# Patient Record
Sex: FEMALE | Race: BLACK OR AFRICAN AMERICAN | NOT HISPANIC OR LATINO | Employment: FULL TIME | URBAN - METROPOLITAN AREA
[De-identification: names, ages, dates, MRNs, and addresses within clinical notes are randomized per-mention and may not be internally consistent; named-entity substitution may affect disease eponyms.]

---

## 2019-03-17 LAB — EXTERNAL GENE TEST BETA-THALASSEMIA: NORMAL

## 2020-01-27 ENCOUNTER — APPOINTMENT (EMERGENCY)
Dept: ULTRASOUND IMAGING | Facility: HOSPITAL | Age: 25
End: 2020-01-27
Payer: COMMERCIAL

## 2020-01-27 ENCOUNTER — HOSPITAL ENCOUNTER (EMERGENCY)
Facility: HOSPITAL | Age: 25
Discharge: HOME | End: 2020-01-27
Attending: EMERGENCY MEDICINE
Payer: COMMERCIAL

## 2020-01-27 VITALS
RESPIRATION RATE: 18 BRPM | SYSTOLIC BLOOD PRESSURE: 130 MMHG | HEART RATE: 72 BPM | TEMPERATURE: 98.7 F | DIASTOLIC BLOOD PRESSURE: 71 MMHG | WEIGHT: 131.61 LBS

## 2020-01-27 DIAGNOSIS — O20.0 THREATENED MISCARRIAGE: ICD-10-CM

## 2020-01-27 DIAGNOSIS — N93.9 VAGINAL BLEEDING: Primary | ICD-10-CM

## 2020-01-27 LAB
ABO GROUP BLD: NORMAL
ALBUMIN SERPL BCP-MCNC: 3.9 G/DL (ref 3.5–5)
ALP SERPL-CCNC: 48 U/L (ref 46–116)
ALT SERPL W P-5'-P-CCNC: 26 U/L (ref 12–78)
ANION GAP SERPL CALCULATED.3IONS-SCNC: 8 MMOL/L (ref 4–13)
AST SERPL W P-5'-P-CCNC: 22 U/L (ref 5–45)
B-HCG SERPL-ACNC: 4284 MIU/ML
BASOPHILS # BLD AUTO: 0.02 THOUSANDS/ΜL (ref 0–0.1)
BASOPHILS NFR BLD AUTO: 1 % (ref 0–1)
BILIRUB SERPL-MCNC: 0.47 MG/DL (ref 0.2–1)
BILIRUB UR QL STRIP: NEGATIVE
BUN SERPL-MCNC: 9 MG/DL (ref 5–25)
C TRACH DNA SPEC QL NAA+PROBE: NEGATIVE
CALCIUM SERPL-MCNC: 9.2 MG/DL (ref 8.3–10.1)
CHLORIDE SERPL-SCNC: 103 MMOL/L (ref 100–108)
CLARITY UR: CLEAR
CO2 SERPL-SCNC: 26 MMOL/L (ref 21–32)
COLOR UR: YELLOW
CREAT SERPL-MCNC: 0.75 MG/DL (ref 0.6–1.3)
EOSINOPHIL # BLD AUTO: 0.03 THOUSAND/ΜL (ref 0–0.61)
EOSINOPHIL NFR BLD AUTO: 1 % (ref 0–6)
ERYTHROCYTE [DISTWIDTH] IN BLOOD BY AUTOMATED COUNT: 15.3 % (ref 11.6–15.1)
GFR SERPL CREATININE-BSD FRML MDRD: 127 ML/MIN/1.73SQ M
GLUCOSE SERPL-MCNC: 80 MG/DL (ref 65–140)
GLUCOSE UR STRIP-MCNC: NEGATIVE MG/DL
HCT VFR BLD AUTO: 37.1 % (ref 34.8–46.1)
HGB BLD-MCNC: 11.8 G/DL (ref 11.5–15.4)
HGB UR QL STRIP.AUTO: NEGATIVE
IMM GRANULOCYTES # BLD AUTO: 0 THOUSAND/UL (ref 0–0.2)
IMM GRANULOCYTES NFR BLD AUTO: 0 % (ref 0–2)
KETONES UR STRIP-MCNC: NEGATIVE MG/DL
LEUKOCYTE ESTERASE UR QL STRIP: NEGATIVE
LYMPHOCYTES # BLD AUTO: 1.7 THOUSANDS/ΜL (ref 0.6–4.47)
LYMPHOCYTES NFR BLD AUTO: 45 % (ref 14–44)
MCH RBC QN AUTO: 29.4 PG (ref 26.8–34.3)
MCHC RBC AUTO-ENTMCNC: 31.8 G/DL (ref 31.4–37.4)
MCV RBC AUTO: 93 FL (ref 82–98)
MONOCYTES # BLD AUTO: 0.29 THOUSAND/ΜL (ref 0.17–1.22)
MONOCYTES NFR BLD AUTO: 8 % (ref 4–12)
N GONORRHOEA DNA SPEC QL NAA+PROBE: NEGATIVE
NEUTROPHILS # BLD AUTO: 1.77 THOUSANDS/ΜL (ref 1.85–7.62)
NEUTS SEG NFR BLD AUTO: 45 % (ref 43–75)
NITRITE UR QL STRIP: NEGATIVE
NRBC BLD AUTO-RTO: 0 /100 WBCS
PH UR STRIP.AUTO: 5.5 [PH]
PLATELET # BLD AUTO: 196 THOUSANDS/UL (ref 149–390)
PMV BLD AUTO: 9.2 FL (ref 8.9–12.7)
POTASSIUM SERPL-SCNC: 4.3 MMOL/L (ref 3.5–5.3)
PROT SERPL-MCNC: 7.8 G/DL (ref 6.4–8.2)
PROT UR STRIP-MCNC: NEGATIVE MG/DL
RBC # BLD AUTO: 4.01 MILLION/UL (ref 3.81–5.12)
RH BLD: POSITIVE
SODIUM SERPL-SCNC: 137 MMOL/L (ref 136–145)
SP GR UR STRIP.AUTO: 1.02 (ref 1–1.03)
UROBILINOGEN UR QL STRIP.AUTO: 0.2 E.U./DL
WBC # BLD AUTO: 3.81 THOUSAND/UL (ref 4.31–10.16)

## 2020-01-27 PROCEDURE — 36415 COLL VENOUS BLD VENIPUNCTURE: CPT | Performed by: EMERGENCY MEDICINE

## 2020-01-27 PROCEDURE — 99284 EMERGENCY DEPT VISIT MOD MDM: CPT

## 2020-01-27 PROCEDURE — 99284 EMERGENCY DEPT VISIT MOD MDM: CPT | Performed by: EMERGENCY MEDICINE

## 2020-01-27 PROCEDURE — 87591 N.GONORRHOEAE DNA AMP PROB: CPT | Performed by: EMERGENCY MEDICINE

## 2020-01-27 PROCEDURE — 86901 BLOOD TYPING SEROLOGIC RH(D): CPT | Performed by: EMERGENCY MEDICINE

## 2020-01-27 PROCEDURE — 87491 CHLMYD TRACH DNA AMP PROBE: CPT | Performed by: EMERGENCY MEDICINE

## 2020-01-27 PROCEDURE — 85025 COMPLETE CBC W/AUTO DIFF WBC: CPT | Performed by: EMERGENCY MEDICINE

## 2020-01-27 PROCEDURE — 86900 BLOOD TYPING SEROLOGIC ABO: CPT | Performed by: EMERGENCY MEDICINE

## 2020-01-27 PROCEDURE — 84702 CHORIONIC GONADOTROPIN TEST: CPT | Performed by: EMERGENCY MEDICINE

## 2020-01-27 PROCEDURE — 81003 URINALYSIS AUTO W/O SCOPE: CPT | Performed by: EMERGENCY MEDICINE

## 2020-01-27 PROCEDURE — 80053 COMPREHEN METABOLIC PANEL: CPT | Performed by: EMERGENCY MEDICINE

## 2020-01-27 PROCEDURE — 76801 OB US < 14 WKS SINGLE FETUS: CPT

## 2020-01-27 NOTE — ED NOTES
Pelvic exam completed by Dr Brian Connelly with RN at [address] , [address] [place] [address]  01/27/20 7048

## 2020-01-27 NOTE — ED NOTES
Pt angry wanting to know what is taking so long for results, wants to leave saying over and over "I'm on a time frame I have kids and when you tell me a time frame I stick to it, the ultasound " lady" told me 1/2 hour and that was at 11:30 I need to leave and  my kids "  Dr Steven Nielsen in attempting to explain that we're waiting for the rest of her results  She began to argue with him, picked up her personal items and walked out        Lucas Chan, JULIA  01/27/20 [address] RN  01/27/20 4644

## 2020-01-27 NOTE — ED PROVIDER NOTES
History  Chief Complaint   Patient presents with    Vaginal Bleeding     Pt  reports just finding out she was pregnant, approx 6 weeks  Pt  reports having intercourse with  last night, this am she had a watery discharge and very light vaginal bleeding     Patient is a 43-year-old female who is currently pregnant and presents with vaginal bleeding  Patient states that her last menstrual cycle was the last week in December  She took multiple home pregnancy tests over the weekend which were all positive  Last evening, she had sexual intercourse with her  and today noted vaginal bleeding  She states that it is light but concerning given the fact that she had a miscarriage last year  She denies abdominal pain/cramping, back pain or other complaints  She denies fever, chills, nausea, vomiting, diarrhea, dysuria, hematuria or other complaints  She is [id number]      History provided by:  Patient  Vaginal Bleeding   Quality:  Spotting  Duration:  1 day  Timing:  Intermittent  Chronicity:  New  Possible pregnancy: yes    Context: after intercourse    Ineffective treatments:  None tried  Associated symptoms: vaginal discharge (clear)    Associated symptoms: no abdominal pain, no back pain, no dizziness, no dysuria, no fever and no nausea        None       History reviewed  No pertinent past medical history  History reviewed  No pertinent surgical history  History reviewed  No pertinent family history  I have reviewed and agree with the history as documented  Social History     Tobacco Use    Smoking status: Former Smoker    Smokeless tobacco: Never Used   Substance Use Topics    Alcohol use: Never     Frequency: Never    Drug use: Never        Review of Systems   Constitutional: Negative for chills, diaphoresis and fever  HENT: Negative for nosebleeds, sore throat and trouble swallowing  Eyes: Negative for photophobia, pain and visual disturbance     Respiratory: Negative for cough, chest tightness and shortness of breath  Cardiovascular: Negative for chest pain, palpitations and leg swelling  Gastrointestinal: Negative for abdominal pain, constipation, diarrhea, nausea and vomiting  Endocrine: Negative for polydipsia and polyuria  Genitourinary: Positive for vaginal bleeding and vaginal discharge (clear)  Negative for difficulty urinating, dysuria, hematuria and pelvic pain  Musculoskeletal: Negative for back pain, neck pain and neck stiffness  Skin: Negative for pallor and rash  Neurological: Negative for dizziness, seizures, light-headedness and headaches  All other systems reviewed and are negative  Physical Exam  Physical Exam   Constitutional: She is oriented to person, place, and time  She appears well-developed and well-nourished  No distress  HENT:   Head: Normocephalic and atraumatic  Mouth/Throat: Oropharynx is clear and moist and mucous membranes are normal    Eyes: Pupils are equal, round, and reactive to light  EOM are normal    Neck: Normal range of motion  Neck supple  Cardiovascular: Normal rate, regular rhythm, normal heart sounds, intact distal pulses and normal pulses  Pulmonary/Chest: Effort normal and breath sounds normal  No respiratory distress  Abdominal: Soft  She exhibits mass (small, reducible umbilical hernia)  She exhibits no distension  There is no tenderness  There is no rigidity, no rebound and no guarding  Genitourinary: There is bleeding in the vagina  No signs of injury around the vagina  Genitourinary Comments: Small amount of dark blood from the cervical os  No clots or tissue visible  Os closed  Musculoskeletal: Normal range of motion  She exhibits no edema or tenderness  Lymphadenopathy:     She has no cervical adenopathy  Neurological: She is alert and oriented to person, place, and time  She has normal strength  No cranial nerve deficit or sensory deficit  Skin: Skin is warm and dry   Capillary refill takes less than 2 seconds  Psychiatric: She has a normal mood and affect  Nursing note and vitals reviewed        Vital Signs  ED Triage Vitals [01/27/20 1040]   Temperature Pulse Respirations Blood Pressure SpO2   98 7 °F (37 1 °C) 72 18 130/71 --      Temp Source Heart Rate Source Patient Position - Orthostatic VS BP Location FiO2 (%)   Oral Monitor Lying Right arm --      Pain Score       --           Vitals:    01/27/20 1040   BP: 130/71   Pulse: 72   Patient Position - Orthostatic VS: Lying         Visual Acuity      ED Medications  Medications - No data to display    Diagnostic Studies  Results Reviewed     Procedure Component Value Units Date/Time    hCG, quantitative [[id number]]  (Abnormal) Collected:  01/27/20 1114    Lab Status:  Final result Specimen:  Blood from Arm, Right Updated:  01/27/20 1322     HCG, Quant 4,284 mIU/mL     Narrative:        Expected Ranges:     Approximate               Approximate HCG  Gestation age          Concentration ( mIU/mL)  _____________          ______________________   [facility]                      HCG values  0 2-1                       5-50  1-2                           2-3                         100-5000  3-4                         500-54774  4-5                         1000-14572  5-6                         [id number]  6-8                         22849-728647  8-12                        [id number]      Comprehensive metabolic panel [[id number]] Collected:  01/27/20 1114    Lab Status:  Final result Specimen:  Blood from Arm, Right Updated:  01/27/20 1207     Sodium 137 mmol/L      Potassium 4 3 mmol/L      Chloride 103 mmol/L      CO2 26 mmol/L      ANION GAP 8 mmol/L      BUN 9 mg/dL      Creatinine 0 75 mg/dL      Glucose 80 mg/dL      Calcium 9 2 mg/dL      AST 22 U/L      ALT 26 U/L      Alkaline Phosphatase 48 U/L      Total Protein 7 8 g/dL      Albumin 3 9 g/dL      Total Bilirubin 0 47 mg/dL      eGFR 127 ml/min/1 73sq m     Narrative:       Consolidated [employer] guidelines for Chronic Kidney Disease (CKD):     Stage 1 with normal or high GFR (GFR > 90 mL/min/1 73 square meters)    Stage 2 Mild CKD (GFR = 60-89 mL/min/1 73 square meters)    Stage 3A Moderate CKD (GFR = 45-59 mL/min/1 73 square meters)    Stage 3B Moderate CKD (GFR = 30-44 mL/min/1 73 square meters)    Stage 4 Severe CKD (GFR = 15-29 mL/min/1 73 square meters)    Stage 5 End Stage CKD (GFR <15 mL/min/1 73 square meters)  Note: GFR calculation is accurate only with a steady state creatinine    UA (URINE) with reflex to Scope [[id number]] Collected:  01/27/20 1114    Lab Status:  Final result Specimen:  Urine, Clean Catch Updated:  01/27/20 1139     Color, UA Yellow     Clarity, UA Clear     Specific Gravity, UA 1 025     pH, UA 5 5     Leukocytes, UA Negative     Nitrite, UA Negative     Protein, UA Negative mg/dl      Glucose, UA Negative mg/dl      Ketones, UA Negative mg/dl      Urobilinogen, UA 0 2 E U /dl      Bilirubin, UA Negative     Blood, UA Negative    CBC and differential [[id number]]  (Abnormal) Collected:  01/27/20 1114    Lab Status:  Final result Specimen:  Blood from Arm, Right Updated:  01/27/20 1130     WBC 3 81 Thousand/uL      RBC 4 01 Million/uL      Hemoglobin 11 8 g/dL      Hematocrit 37 1 %      MCV 93 fL      MCH 29 4 pg      MCHC 31 8 g/dL      RDW 15 3 %      MPV 9 2 fL      Platelets 002 Thousands/uL      nRBC 0 /100 WBCs      Neutrophils Relative 45 %      Immat GRANS % 0 %      Lymphocytes Relative 45 %      Monocytes Relative 8 %      Eosinophils Relative 1 %      Basophils Relative 1 %      Neutrophils Absolute 1 77 Thousands/µL      Immature Grans Absolute 0 00 Thousand/uL      Lymphocytes Absolute 1 70 Thousands/µL      Monocytes Absolute 0 29 Thousand/µL      Eosinophils Absolute 0 03 Thousand/µL      Basophils Absolute 0 02 Thousands/µL     Chlamydia/GC amplified DNA by PCR [[id number]] Collected:  01/27/20 1114    Lab Status:   In process Specimen:  Urine, Other Updated:  01/27/20 1127                 US OB < 14 weeks with transvaginal   Final Result by Bud Zavaleta MD (01/27 1152)   An irregularly-shaped heterogeneous appearing intrauterine gestational sac identified  According to sac size estimated gestational age would be approximately 6 weeks 5 days  No fetal pole, cardiac activity or yolk sac is seen  No adnexal mass, no free fluid  Based on current consensus literature terminology, this is an intrauterine pregnancy of uncertain viability, although concerning for but not definitively diagnostic of nonviable intrauterine pregnancy  Serial quantitative beta hCG levels and/or repeat    ultrasound recommended as clinically indicated   Reference: N Engl J Med 538;57 pp  2063 to 1451  The examination demonstrates a significant  finding and was documented as such in [employer] for liaison and referring practitioner notification  Workstation performed: [id number]                    Procedures  Procedures         ED Course  ED Course as of Jan 27 1822   Miriam Amaya Jan 27, 2020   1321 Patient left without completing workup and treatment  MDM  Number of Diagnoses or Management Options  Threatened miscarriage: new and requires workup  Vaginal bleeding: new and requires workup  Diagnosis management comments: Patient presents with early pregnancy and vaginal bleeding  Pelvic exam revealed small amount of bleeding from cervical os  Cervical os is closed and there is no evidence of trauma  Ultrasound shows an intrauterine pregnancy  However there are no fetal heart tones and is concerning for nonviable pregnancy  Patient is Rh positive and does not require RhoGAM   Prior to discussing lab results and ultrasound, patient became angry about the delay  She demanded that her IV be taken out  I entered the room after being notified by nursing  I spoke with patient and tried to relay the results of her workup    However patient did not want to hear about her results when left the room  Patient states she will be going to [facility]   Patient was hemodynamically stable and well-appearing as she left prior to completing treatment  Amount and/or Complexity of Data Reviewed  Clinical lab tests: ordered and reviewed  Tests in the radiology section of CPT®: ordered and reviewed  Tests in the medicine section of CPT®: ordered and reviewed  Review and summarize past medical records: yes  Independent visualization of images, tracings, or specimens: yes    Risk of Complications, Morbidity, and/or Mortality  Presenting problems: high  Diagnostic procedures: moderate  Management options: moderate    Patient Progress  Patient progress: stable        Disposition  Final diagnoses:   Vaginal bleeding   Threatened miscarriage     Time reflects when diagnosis was documented in both MDM as applicable and the Disposition within this note     Time User Action Codes Description Comment    1/27/2020  1:22 PM Deborah Bue Add [N93 9] Vaginal bleeding     1/27/2020  1:22 PM Deborah Bue Add [O20 0] Threatened miscarriage       ED Disposition     ED Disposition Condition Date/Time Comment    Left from Room after Provider Exam  Mon Jan 27, 2020  1:22 PM       Follow-up Information    None         There are no discharge medications for this patient  No discharge procedures on file      ED Provider  Electronically Signed by           Glenn Hawley DO  01/27/20 Matilda Henderson

## 2020-03-06 ENCOUNTER — TELEPHONE (OUTPATIENT)
Dept: OBGYN CLINIC | Facility: CLINIC | Age: 25
End: 2020-03-06

## 2020-03-06 DIAGNOSIS — F41.9 ANXIETY AND DEPRESSION: Primary | ICD-10-CM

## 2020-03-06 DIAGNOSIS — F32.A ANXIETY AND DEPRESSION: Primary | ICD-10-CM

## 2020-03-06 RX ORDER — BUSPIRONE HYDROCHLORIDE 15 MG/1
15 TABLET ORAL 2 TIMES DAILY
Qty: 60 TABLET | Refills: 0 | Status: SHIPPED | OUTPATIENT
Start: 2020-03-06 | End: 2020-11-15

## 2020-03-06 NOTE — TELEPHONE ENCOUNTER
Patient called and said you can call her Buspar into [facility] in [facility]

## 2020-03-06 NOTE — TELEPHONE ENCOUNTER
Edc 9/24/2020, she is having problems with depression,Bustirone HCl was given   In February , she states it does help, she is having , a lot of problems, at home, , she is happy about the pregnancy, , she does not have any thoughts of hurting herself , please advise

## 2020-03-09 ENCOUNTER — DOCUMENTATION (OUTPATIENT)
Dept: OBGYN CLINIC | Facility: CLINIC | Age: 25
End: 2020-03-09

## 2020-03-16 ENCOUNTER — TELEPHONE (OUTPATIENT)
Dept: OBGYN CLINIC | Facility: CLINIC | Age: 25
End: 2020-03-16

## 2020-03-16 NOTE — TELEPHONE ENCOUNTER
called patient back as per Dr Maxx Morel, advised to come in for  An evaluation , spoke with patient she has declined, states that when she was  At  [address] [facility], Maternal fetal medicine, and they said this was ok if not red in color, advised would be best to be seen today, she states she will come tomorrow for her regular scheduled appointment

## 2020-03-16 NOTE — TELEPHONE ENCOUNTER
Patient called she has her first Ob appointment with you tomorrow  , she has some brown discharge, and was worried about coming in due to the  [employer] virus, she is not having any cramping,  O positive blood type , she is 12 w 4d gestation

## 2020-03-17 ENCOUNTER — ROUTINE PRENATAL (OUTPATIENT)
Dept: OBGYN CLINIC | Facility: CLINIC | Age: 25
End: 2020-03-17
Payer: COMMERCIAL

## 2020-03-17 VITALS
HEIGHT: 65 IN | DIASTOLIC BLOOD PRESSURE: 70 MMHG | BODY MASS INDEX: 22.59 KG/M2 | SYSTOLIC BLOOD PRESSURE: 108 MMHG | WEIGHT: 135.6 LBS

## 2020-03-17 DIAGNOSIS — O46.8X1 SUBCHORIONIC HEMORRHAGE OF PLACENTA IN FIRST TRIMESTER, SINGLE OR UNSPECIFIED FETUS: ICD-10-CM

## 2020-03-17 DIAGNOSIS — F41.9 ANXIETY AND DEPRESSION: ICD-10-CM

## 2020-03-17 DIAGNOSIS — Z3A.12 12 WEEKS GESTATION OF PREGNANCY: Primary | ICD-10-CM

## 2020-03-17 DIAGNOSIS — F32.A ANXIETY AND DEPRESSION: ICD-10-CM

## 2020-03-17 DIAGNOSIS — O20.9 FIRST TRIMESTER BLEEDING: ICD-10-CM

## 2020-03-17 DIAGNOSIS — O09.219 PREVIOUS PRETERM DELIVERY, ANTEPARTUM: ICD-10-CM

## 2020-03-17 DIAGNOSIS — O41.8X10 SUBCHORIONIC HEMORRHAGE OF PLACENTA IN FIRST TRIMESTER, SINGLE OR UNSPECIFIED FETUS: ICD-10-CM

## 2020-03-17 PROBLEM — O41.8X90 SUBCHORIONIC HEMORRHAGE: Status: ACTIVE | Noted: 2020-02-14

## 2020-03-17 PROBLEM — O46.8X9 SUBCHORIONIC HEMORRHAGE: Status: ACTIVE | Noted: 2020-02-14

## 2020-03-17 LAB
CFTR MUT ANL BLD/T: NORMAL
EXTERNAL AFP: NORMAL
EXTERNAL GENE TEST BETA-THALASSEMIA: NORMAL
EXTERNAL SICKLE CELL SCREEN: NEGATIVE
SL AMB  POCT GLUCOSE, UA: NORMAL
SL AMB LEUKOCYTE ESTERASE,UA: NORMAL
SL AMB POCT BILIRUBIN,UA: NORMAL
SL AMB POCT BLOOD,UA: NORMAL
SL AMB POCT KETONES,UA: NORMAL
SL AMB POCT NITRITE,UA: NORMAL
SL AMB POCT SPECIFIC GRAVITY,UA: NORMAL
SL AMB POCT URINE PROTEIN: NORMAL
SL AMB POCT UROBILINOGEN: NORMAL

## 2020-03-17 PROCEDURE — 81002 URINALYSIS NONAUTO W/O SCOPE: CPT | Performed by: OBSTETRICS & GYNECOLOGY

## 2020-03-17 PROCEDURE — 99214 OFFICE O/P EST MOD 30 MIN: CPT | Performed by: OBSTETRICS & GYNECOLOGY

## 2020-03-17 RX ORDER — SWAB
1 SWAB, NON-MEDICATED MISCELLANEOUS DAILY
COMMUNITY
End: 2020-12-09 | Stop reason: SDUPTHER

## 2020-03-17 RX ORDER — METOCLOPRAMIDE 10 MG/1
10 TABLET ORAL 4 TIMES DAILY
COMMUNITY
End: 2020-11-15

## 2020-03-17 NOTE — PATIENT INSTRUCTIONS
Subchorionic Hemorrhage   WHAT YOU NEED TO KNOW:   A subchorionic hemorrhage [facility]), or hematoma, is a collection of blood between the placenta and the uterus  [facility] usually develops late in the first trimester  The bleeding usually reabsorbs into your body by 20 weeks of pregnancy  Most pregnancies progress without problems  You may have occasional spotting or light bleeding throughout your pregnancy  DISCHARGE INSTRUCTIONS:   Return to the emergency department if:   · You have a fever  · You have abdominal pain  · You have a sudden increase in bleeding  Contact your healthcare provider if:   · Your bleeding has increased  · You have questions or concerns about your condition or care  Pelvic rest:  Do not have sex, douche, or use tampons  Do not strain or lift heavy objects  These activities may cause contractions or infection and put you or your baby at risk  You may need to rest more than usual  Do daily activities as directed  Follow up with your healthcare provider as directed: You may need to return frequently for ultrasounds  Write down your questions so you remember to ask them during your visits  © 2017 [facility] Information is for End User's use only and may not be sold, redistributed or otherwise used for commercial purposes  All illustrations and images included in CareNotes® are the copyrighted property of A D A M , Inc  or Kurt Longo  The above information is an  only  It is not intended as medical advice for individual conditions or treatments  Talk to your doctor, nurse or pharmacist before following any medical regimen to see if it is safe and effective for you

## 2020-03-17 NOTE — PROGRESS NOTES
Pt states that she was bleeding and now her discharge looks like old blood, past two days pt has had severe nausea and no vomiting, pt has hot flashes, slight headaches no dizziness, sob while walking, swelling of feet  Pt states while picking up her daughter she notices her hernia comes out  Pt has minor back pain no further problems or complaints

## 2020-03-17 NOTE — PROGRESS NOTES
Patient presents at 12 weeks and 5 days gestational age for her 1st OB visit  Patient has a known subchorionic hematoma and is presently under pelvic rest   She has seen maternal fetal medicine on 2 separate occasions  She has a follow-up with maternal fetal medicine scheduled  Patient had normal cultures in January of 2020  Pap smear was performed today  Patient is  and in a long-term monogamous relationship  She is presently doing well on BuSpar 15 mg twice daily  She states that she suffers from anxiety and depression exacerbated by the loss of her father in November of 2019  She denies any thoughts of harming her children or harming herself    Follow-up  in 4 weeks

## 2020-03-25 ENCOUNTER — TELEPHONE (OUTPATIENT)
Dept: OBGYN CLINIC | Facility: CLINIC | Age: 25
End: 2020-03-25

## 2020-03-25 NOTE — TELEPHONE ENCOUNTER
Patient called had some brown spotting this weekend, this has been recurrent, her blood type is  O positive , as per patient and she has no cramping, she has an appointment at [facility], this week

## 2020-03-25 NOTE — TELEPHONE ENCOUNTER
If she is having any cramping or spotting continues please have her come in to see me as soon as possible

## 2020-03-25 NOTE — TELEPHONE ENCOUNTER
As per Dr Rashard Gutierrez via paper chart note, panorama results is low risk, male fetus, spoke with patient requested verbal results, also requested copy be left at

## 2020-03-26 ENCOUNTER — OB ABSTRACT (OUTPATIENT)
Dept: OBGYN CLINIC | Facility: CLINIC | Age: 25
End: 2020-03-26

## 2020-03-26 ENCOUNTER — ROUTINE PRENATAL (OUTPATIENT)
Dept: OBGYN CLINIC | Facility: CLINIC | Age: 25
End: 2020-03-26
Payer: COMMERCIAL

## 2020-03-26 VITALS
BODY MASS INDEX: 24.11 KG/M2 | HEIGHT: 65 IN | SYSTOLIC BLOOD PRESSURE: 112 MMHG | WEIGHT: 144.7 LBS | DIASTOLIC BLOOD PRESSURE: 70 MMHG

## 2020-03-26 DIAGNOSIS — F32.A ANXIETY AND DEPRESSION: ICD-10-CM

## 2020-03-26 DIAGNOSIS — F41.9 ANXIETY AND DEPRESSION: ICD-10-CM

## 2020-03-26 DIAGNOSIS — O26.851 SPOTTING COMPLICATING PREGNANCY IN FIRST TRIMESTER: ICD-10-CM

## 2020-03-26 DIAGNOSIS — Z3A.14 14 WEEKS GESTATION OF PREGNANCY: Primary | ICD-10-CM

## 2020-03-26 LAB
SL AMB  POCT GLUCOSE, UA: NORMAL
SL AMB LEUKOCYTE ESTERASE,UA: NORMAL
SL AMB POCT BILIRUBIN,UA: NORMAL
SL AMB POCT BLOOD,UA: NORMAL
SL AMB POCT KETONES,UA: NORMAL
SL AMB POCT NITRITE,UA: NORMAL
SL AMB POCT SPECIFIC GRAVITY,UA: NORMAL
SL AMB POCT URINE PROTEIN: NORMAL
SL AMB POCT UROBILINOGEN: NORMAL

## 2020-03-26 PROCEDURE — 99213 OFFICE O/P EST LOW 20 MIN: CPT | Performed by: OBSTETRICS & GYNECOLOGY

## 2020-03-26 NOTE — PROGRESS NOTES
Patient presents at 17 weeks gestational age complaining of continued brown vaginal staining  Patient states she notes it each time she uses the bathroom  She notes brown in the toilet as well as on the toilet tissue  She denies any pain  She denies any clear fluid  She denies any dysuria or urinary frequency  She is not yet appreciating fetal movement  Her cervix continues to be closed and long however evidence of old brown blood in the cervix and in vaginal vault  Patient does have a known subchorionic hematoma  Continue pelvic rest   Recommend ultrasound with Maternal-Fetal Medicine as soon as feasible  Patient will call for any bright red bleeding  Follow up for regular scheduled OB visit

## 2020-03-26 NOTE — PROGRESS NOTES
Bleeding has slowed down  Pt complains about sore legs and a sore back, she states she passed a blood clot   Lower abdomen is sore

## 2020-03-26 NOTE — PATIENT INSTRUCTIONS
[address]    Destiney Hernandez  1995      To Whom It May Concern:    Destiney Hernandez  is currently under obstetrical care with an Estimated Date of Delivery: 20  We encourage good oral hygiene during pregnancy, including regular dental cleanings  If it becomes necessary for additional dental work to be done such as fillings, root canal, etc , it is best to have it done after the first trimester  However, emergency care can be given throughout pregnancy as needed  The following are recommendations for the obstetrical patients:    1  Double shielded x-rays  2  Local anesthetic without epinephrine  3  Tylenol with codeine for pain  4  Amoxicillin, Penicillin, Erythromycin, Keflex or other Cephalosporins for antibiotics  5  Inhalation therapy and other antibiotics are not recommended  If you have any other questions regarding this patient, please do not hesitate to call us  Thank you,         Corrina Castellon MD     Threatened Miscarriage   WHAT YOU NEED TO KNOW:   A threatened miscarriage occurs when you have vaginal bleeding within the first 20 weeks of pregnancy  It means that a miscarriage may happen  A threatened miscarriage may also be called a threatened   DISCHARGE INSTRUCTIONS:   Seek care immediately if:   · You feel weak or faint  · Your pain or cramping in your abdomen or back gets worse  · You have vaginal bleeding that soaks 1 or more pads in an hour  · You pass material that looks like tissue or large clots  Contact your healthcare provider or obstetrician if:   · You have a fever  · You have trouble urinating, burning when you urinate, or feel a need to urinate often  · You have new or worsening vaginal bleeding  · You have vaginal pain or itching, or vaginal discharge that is yellow, green, or foul-smelling  · You have questions or concerns about your condition or care  Self-care:   The following may help you manage your symptoms and decrease your risk for a miscarriage:  · Do not put anything in your vagina  Do not have sex, douche, or use tampons  These actions may increase your risk for infection and miscarriage  · Rest as directed  Do not exercise or do strenuous activities  These activities may cause  labor or miscarriage  Ask your healthcare provider what activities are okay to do  Stay healthy during pregnancy:   · Eat a variety of healthy foods  Healthy foods can help you get extra protein, water, and calories that you need while you are pregnant  Healthy foods include fruits, vegetables, whole-grain breads, low-fat dairy products, beans, lean meats, and fish  Avoid raw or undercooked meat and fish  Ask your healthcare provider if you need a special diet  · Take prenatal vitamins as directed  These help you get the right amount of vitamins and minerals  They may also decrease the risk of certain birth defects  · Do not drink alcohol or use illegal drugs  These can increase your risk for a miscarriage or harm your baby  · Do not smoke  Nicotine and other chemicals in cigarettes and cigars can harm your baby and cause miscarriage or  labor  Ask your healthcare provider for information if you currently smoke and need help to quit  E-cigarettes or smokeless tobacco still contain nicotine  Do not use these products  · Decrease your risk for an infection  Always wash your hands before eating or preparing meals  Do not spend time with people who are sick  Ask your healthcare provider if you need immunizations such as the flu or hepatitis B vaccine  Immunizations may decrease your risk for infections that could cause a miscarriage  · Manage your medical conditions  Keep your blood pressure and blood sugars under control  Maintain a healthy weight during pregnancy  Follow up with your obstetrician as directed:   You may need to see your obstetrician frequently for ultrasounds or blood tests  Write down your questions so you remember to ask them during your visits  © 2017 [address] [employer] Information is for End User's use only and may not be sold, redistributed or otherwise used for commercial purposes  All illustrations and images included in CareNotes® are the copyrighted property of [employer] Inc  or Kurt Longo  The above information is an  only  It is not intended as medical advice for individual conditions or treatments  Talk to your doctor, nurse or pharmacist before following any medical regimen to see if it is safe and effective for you

## 2020-03-27 ENCOUNTER — ULTRASOUND (OUTPATIENT)
Dept: PERINATAL CARE | Facility: OTHER | Age: 25
End: 2020-03-27
Payer: COMMERCIAL

## 2020-03-27 VITALS — HEIGHT: 65 IN | WEIGHT: 143.4 LBS | BODY MASS INDEX: 23.89 KG/M2

## 2020-03-27 DIAGNOSIS — O09.219 PREVIOUS PRETERM DELIVERY, ANTEPARTUM: ICD-10-CM

## 2020-03-27 DIAGNOSIS — O41.8X20 SUBCHORIONIC HEMORRHAGE OF PLACENTA IN SECOND TRIMESTER, SINGLE OR UNSPECIFIED FETUS: ICD-10-CM

## 2020-03-27 DIAGNOSIS — F32.A ANXIETY AND DEPRESSION: ICD-10-CM

## 2020-03-27 DIAGNOSIS — F41.9 ANXIETY AND DEPRESSION: ICD-10-CM

## 2020-03-27 DIAGNOSIS — O46.8X2 SUBCHORIONIC HEMORRHAGE OF PLACENTA IN SECOND TRIMESTER, SINGLE OR UNSPECIFIED FETUS: ICD-10-CM

## 2020-03-27 DIAGNOSIS — Z3A.14 14 WEEKS GESTATION OF PREGNANCY: Primary | ICD-10-CM

## 2020-03-27 PROCEDURE — 99202 OFFICE O/P NEW SF 15 MIN: CPT | Performed by: OBSTETRICS & GYNECOLOGY

## 2020-03-27 PROCEDURE — 76813 OB US NUCHAL MEAS 1 GEST: CPT | Performed by: OBSTETRICS & GYNECOLOGY

## 2020-03-27 NOTE — PROGRESS NOTES
Ob ultrasound and brief MFM consultation    An ultrasound for viability, dating and nuchal translucency was completed today  Ms Makenna Tolentino reports passing of clots per vagina; no cramping no fever  Ms Makenna Tolentino provided a result of NIPT testing on c her cell phone as low risk  See Ob Procedures in EPIC  1  Live, [place] fetus with size = dates; JANES 20  Normal nuchal translucency at 1 93 mm    2  Large subchorionic collection observed which is covering the internal os  Today's ultrasound findings and suggested follow-up were discussed  with the patient  She was very upset when was informed about the findings of  a large subchorionic collection and suggested we were not being precise with the information  She then calmed down  I had a chaperone in the room (nurse Leslye)  Ob Hx:     : Delivery at term, vaginally, 8lb, baby boy no complications  2019:  delivery at 39? weeks, after spontaneous labor  5lb 14 oz, vaginally baby girl, no other complications  2019: miscarriage D/C     : current pregnancy  Medical hx: Depression ans anxiety    Surgical hx:  D/C for miscarriage in 2019  Medications: Indicated none  In the record is note is taking Buspar (anxiolytic 2 x day)  PNV  Allergies: none    Family Hx: non contributory    Social Hx: No tobacco alcohol or illicit drug use  D/C tobacco use 1 year ago, I congratulated her  I reviewed the results of this ultrasound with Ms Makenna Tolentino and answered her questions  Approximately 1% of all pregnancies are complicated by placental abruption/large subchorionic colection  Nearly 50% of women with placental abruption have no identifiable risk factors   Risk factors include abruption in a prior pregnancy, maternal hypertensive disorders, advanced maternal age, smoking, cocaine use, polyhydramnios, multiple gestation, () premature rupture of membrane, chorioamnionitis, elevated maternal serum alpha-fetoprotein, and abdominal trauma  Association with thrombophilias has not been confirmed by prospective studies     Counseling to reduce risk of postpartum depression  There are significant risks associated with untreated and under treated depression and other mental illnesses in pregnancy  The [address] [facility] [address] is an additional resource for screening and treatment of depression  Resources for a mental health crisis include the emergency department and the [place]  at 9-533-443-TALK  These interventions can reduce the risk of postpartum depression  We discussed her history of spontaneous  birth at by her report 42 weeks   birth (PTB) is the leading cause of  mortality in the [place]  Spontaneous  birth (PTB) includes birth that follows  labor,  spontaneous rupture of membranes, and cervical insufficiency  Approximately 10% of births in the [place] are   One of the strongest clinical risk factors for PTB is a prior PTB  Maternal history of PTB is commonly reported to confer a 1 5-fold to 2-fold increased risk in a subsequent pregnancy, and recurrence occurs in 28 to 48 percent of patients with a prior PTB and tends to recur at similar gestational ages; recurrence is more likely with an increased number of prior spontaneous PTB  Previously we have also recommended progesterone supplementation, typically 17-hydroxyprogesterone caproate (17OHPC or [place]), starting at 16-24 weeks of gestation to reduce the risk of recurrent spontaneous  birth, along with serial cervical length screening  However, very recent data (2019) from the PROLONG trial, a double blind placebo-controlled international trial, found no benefit of this 17-OHPC compared with placebo in reaching either of the co-primary outcomes (PTB<35 weeks and composite  morbidity or mortality)    Notably the patient population of the PROLONG trial was substantially different from the 2003 Meis trial, reflected in the significantly different baseline rates of PTB in the two trials, with 54 9% recurrent PTB at <37 weeks in the placebo group in [address] [place] [address] vs  21 9% in [address] [facility]  The differences in patient populations studied likely account for the different baseline rates of recurrent PTB and potentially explain some of the contrasting results observed in the [address] [place] [address] and PROLONG trials; however, population differences do not completely explain the discrepancy [facility] - [facility] 2019)  Results of both trials indicate that 17-OHPC appears to be safe, at least in the short term, with no increase in congenital anomalies or evidence of teratogenic effects ([facility] 2019); long-term outcomes are unknown although long-term adverse effects have not yet been reported  [facility] believes that it is reasonable for providers to use 17-OPHC in women with a profile more representative of the very high-risk population reported in the [address] [place] [address] trial; for all women at risk of recurrent sPTB, the risk/benefit discussion should incorporate a shared decision-making approach, taking into account the lack of short-term safety concerns but uncertainty regarding benefit ([facility] 2019)  I recommended that based on these considerations she does not fit the risk profile of women who might benefit from use of weekly IM progesterone, mainly those who had  a  birth at < 32 weeks and have other non modifiable  risk factors  I did explain to Ms Maynor Watt that the finding of  a large subchorionic collection/abruptio placentae is a significant risk factor for rupture of membranes,  birth and possible pregnancy loss  I reviewed how we do not have interventions to reduce that risk and the observation, avoidance of heavy lifting and pelvic rest are indicated    Consultation to the hospital if there is active vaginal bleeding,  fever or any other symptoms was recommended with a call to you, her OB Gyn  Recommendations:    1  Follow-up MSAFP at 16-20 weeks to rule out increase risk for ONTDs knowing it might be abnormal due to the abruptio placentae  Yo be ordered and followed from your office  2  Fetal Level II ultrasound imaging is scheduled at about 18-20 weeks gestation  In addition to review of the ultrasound results I completed a consultation in 20 minutes with > 50% in direct face to face contact and coordination of a plan of care  Thank you for referring your patient to our offices  The limitations of ultrasound to detect all anomalies was reviewed and how it is not  a test to rule out aneuploidy  If you have any further questions do not hesitate to contact us as [phone]      Leon Hidalgo MD

## 2020-03-27 NOTE — LETTER
2020     Dorothy Phelan, [address] [facility] [id number]    Patient: Lyndsey Mosquera   YOB: 1995   Date of Visit: 3/27/2020       Dear Dr Hugo Hartmann:    Thank you for referring Andres Mallory to me for evaluation  Below are my notes for this consultation  If you have questions, please do not hesitate to call me  I look forward to following your patient along with you  Sincerely,         [place] [address] [place]        CC: No Recipients  Serena Dyer MD  3/27/2020  5:45 PM  Sign at close encounter  Ob ultrasound and brief MFM consultation    An ultrasound for viability, dating and nuchal translucency was completed today  Ms Grisel Reyes reports passing of clots per vagina; no cramping no fever  Ms Grisel Reyes provided a result of NIPT testing on c her cell phone as low risk  See Ob Procedures in EPIC  1  Live, [place] fetus with size = dates; JANES 20  Normal nuchal translucency at 1 93 mm    2  Large subchorionic collection observed which is covering the internal os  Today's ultrasound findings and suggested follow-up were discussed  with the patient  She was very upset when was informed about the findings of  a large subchorionic collection and suggested we were not being precise with the information  She then calmed down  I had a chaperone in the room (nurse Leslye)  Ob Hx:     : Delivery at term, vaginally, 8lb, baby boy no complications  2019:  delivery at 39? weeks, after spontaneous labor  5lb 14 oz, vaginally baby girl, no other complications  2019: miscarriage D/C     : current pregnancy  Medical hx: Depression ans anxiety    Surgical hx:  D/C for miscarriage in 2019  Medications: Indicated none  In the record is note is taking Buspar (anxiolytic 2 x day)  PNV  Allergies: none    Family Hx: non contributory    Social Hx: No tobacco alcohol or illicit drug use    D/C tobacco use 1 year ago, I congratulated her     I reviewed the results of this ultrasound with Ms José Miguel Murphy and answered her questions  Approximately 1% of all pregnancies are complicated by placental abruption/large subchorionic colection  Nearly 50% of women with placental abruption have no identifiable risk factors  Risk factors include abruption in a prior pregnancy, maternal hypertensive disorders, advanced maternal age, smoking, cocaine use, polyhydramnios, multiple gestation, () premature rupture of membrane, chorioamnionitis, elevated maternal serum alpha-fetoprotein, and abdominal trauma  Association with thrombophilias has not been confirmed by prospective studies     Counseling to reduce risk of postpartum depression  There are significant risks associated with untreated and under treated depression and other mental illnesses in pregnancy  The [address] [place] [address] [address] is an additional resource for screening and treatment of depression  Resources for a mental health crisis include the emergency department and the [place] [address] at 8-570-174-TALK  These interventions can reduce the risk of postpartum depression  We discussed her history of spontaneous  birth at by her report 42 weeks   birth (PTB) is the leading cause of  mortality in the [place]  Spontaneous  birth (PTB) includes birth that follows  labor,  spontaneous rupture of membranes, and cervical insufficiency  Approximately 10% of births in the [place] are   One of the strongest clinical risk factors for PTB is a prior PTB  Maternal history of PTB is commonly reported to confer a 1 5-fold to 2-fold increased risk in a subsequent pregnancy, and recurrence occurs in 28 to 48 percent of patients with a prior PTB and tends to recur at similar gestational ages; recurrence is more likely with an increased number of prior spontaneous PTB      Previously we have also recommended progesterone supplementation, typically 17-hydroxyprogesterone caproate (17OHPC or [place]), starting at 16-24 weeks of gestation to reduce the risk of recurrent spontaneous  birth, along with serial cervical length screening  However, very recent data (2019) from the PROLONG trial, a double blind placebo-controlled international trial, found no benefit of this 17-OHPC compared with placebo in reaching either of the co-primary outcomes (PTB<35 weeks and composite  morbidity or mortality)  Notably the patient population of the PROLONG trial was substantially different from the 2003 Meis trial, reflected in the significantly different baseline rates of PTB in the two trials, with 54 9% recurrent PTB at <37 weeks in the placebo group in [address] [place] [address] vs  21 9% in [address] [place] [address]  The differences in patient populations studied likely account for the different baseline rates of recurrent PTB and potentially explain some of the contrasting results observed in the [address] [place] [address] and PROLONG trials; however, population differences do not completely explain the discrepancy [facility] - [facility] 2019)  Results of both trials indicate that 17-OHPC appears to be safe, at least in the short term, with no increase in congenital anomalies or evidence of teratogenic effects ([facility] 2019); long-term outcomes are unknown although long-term adverse effects have not yet been reported  [facility] believes that it is reasonable for providers to use 17-OPHC in women with a profile more representative of the very high-risk population reported in the [address] [place] [address] trial; for all women at risk of recurrent sPTB, the risk/benefit discussion should incorporate a shared decision-making approach, taking into account the lack of short-term safety concerns but uncertainty regarding benefit ([facility] 2019)        I recommended that based on these considerations she does not fit the risk profile of women who might benefit from use of weekly IM progesterone, mainly those who had  a  birth at < 32 weeks and have other non modifiable  risk factors  I did explain to Ms Valente Barbour that the finding of  a large subchorionic collection/abruptio placentae is a significant risk factor for rupture of membranes,  birth and possible pregnancy loss  I reviewed how we do not have interventions to reduce that risk and the observation, avoidance of heavy lifting and pelvic rest are indicated  Consultation to the hospital if there is active vaginal bleeding,  fever or any other symptoms was recommended with a call to you, her OB Gyn  Recommendations:    1  Follow-up MSAFP at 16-20 weeks to rule out increase risk for ONTDs knowing it might be abnormal due to the abruptio placentae  Yo be ordered and followed from your office  2  Fetal Level II ultrasound imaging is scheduled at about 18-20 weeks gestation  In addition to review of the ultrasound results I completed a consultation in 20 minutes with > 50% in direct face to face contact and coordination of a plan of care  Thank you for referring your patient to our offices  The limitations of ultrasound to detect all anomalies was reviewed and how it is not  a test to rule out aneuploidy  If you have any further questions do not hesitate to contact us as [phone]      Colt Conn MD

## 2020-03-30 NOTE — PROGRESS NOTES
Recommendations:     1  Follow-up MSAFP at 16-20 weeks to rule out increase risk for ONTDs knowing it might be abnormal due to the abruptio placentae  Yo be ordered and followed from your office      2  Fetal Level II ultrasound imaging is scheduled at about 18-20 weeks gestation       In addition to review of the ultrasound results I completed a consultation in 20 minutes with > 50% in direct face to face contact and coordination of a plan of care      Thank you for referring your patient to our offices  The limitations of ultrasound to detect all anomalies was reviewed and how it is not  a test to rule out aneuploidy   If you have any further questions do not hesitate to contact us as [phone]

## 2020-04-02 ENCOUNTER — OB ABSTRACT (OUTPATIENT)
Dept: OBGYN CLINIC | Facility: CLINIC | Age: 25
End: 2020-04-02

## 2020-04-02 ENCOUNTER — TELEPHONE (OUTPATIENT)
Dept: OBGYN CLINIC | Facility: CLINIC | Age: 25
End: 2020-04-02

## 2020-04-13 ENCOUNTER — TELEPHONE (OUTPATIENT)
Dept: OBGYN CLINIC | Facility: CLINIC | Age: 25
End: 2020-04-13

## 2020-04-16 ENCOUNTER — HOSPITAL ENCOUNTER (OUTPATIENT)
Facility: HOSPITAL | Age: 25
Discharge: HOME | End: 2020-04-16
Attending: OBSTETRICS & GYNECOLOGY | Admitting: OBSTETRICS & GYNECOLOGY
Payer: COMMERCIAL

## 2020-04-16 ENCOUNTER — HOSPITAL ENCOUNTER (EMERGENCY)
Facility: HOSPITAL | Age: 25
Discharge: STILL ADMITTED OR ADMINISTRATIVE | End: 2020-04-16
Payer: COMMERCIAL

## 2020-04-16 VITALS
TEMPERATURE: 98.4 F | HEIGHT: 65 IN | WEIGHT: 143 LBS | DIASTOLIC BLOOD PRESSURE: 65 MMHG | SYSTOLIC BLOOD PRESSURE: 123 MMHG | OXYGEN SATURATION: 99 % | HEART RATE: 88 BPM | RESPIRATION RATE: 24 BRPM | BODY MASS INDEX: 23.82 KG/M2

## 2020-04-16 VITALS
RESPIRATION RATE: 18 BRPM | SYSTOLIC BLOOD PRESSURE: 133 MMHG | DIASTOLIC BLOOD PRESSURE: 84 MMHG | OXYGEN SATURATION: 99 % | HEART RATE: 98 BPM

## 2020-04-16 PROBLEM — Z3A.16 16 WEEKS GESTATION OF PREGNANCY: Status: ACTIVE | Noted: 2020-03-27

## 2020-04-16 LAB
BILIRUB UR QL STRIP: NEGATIVE
CLARITY UR: CLEAR
COLOR UR: YELLOW
GLUCOSE UR STRIP-MCNC: NEGATIVE MG/DL
HGB UR QL STRIP.AUTO: NEGATIVE
KETONES UR STRIP-MCNC: NEGATIVE MG/DL
LEUKOCYTE ESTERASE UR QL STRIP: NEGATIVE
NITRITE UR QL STRIP: NEGATIVE
PH UR STRIP.AUTO: 7.5 [PH] (ref 4.5–8)
PROT UR STRIP-MCNC: NEGATIVE MG/DL
SP GR UR STRIP.AUTO: 1.02 (ref 1–1.03)
UROBILINOGEN UR QL STRIP.AUTO: 0.2 E.U./DL

## 2020-04-16 PROCEDURE — NC001 PR NO CHARGE: Performed by: OBSTETRICS & GYNECOLOGY

## 2020-04-16 PROCEDURE — 99214 OFFICE O/P EST MOD 30 MIN: CPT

## 2020-04-16 PROCEDURE — 81003 URINALYSIS AUTO W/O SCOPE: CPT

## 2020-04-16 PROCEDURE — 76817 TRANSVAGINAL US OBSTETRIC: CPT | Performed by: OBSTETRICS & GYNECOLOGY

## 2020-04-17 ENCOUNTER — ROUTINE PRENATAL (OUTPATIENT)
Dept: OBGYN CLINIC | Facility: CLINIC | Age: 25
End: 2020-04-17
Payer: COMMERCIAL

## 2020-04-17 VITALS — SYSTOLIC BLOOD PRESSURE: 108 MMHG | DIASTOLIC BLOOD PRESSURE: 70 MMHG | WEIGHT: 149 LBS | BODY MASS INDEX: 24.79 KG/M2

## 2020-04-17 DIAGNOSIS — F32.A ANXIETY AND DEPRESSION: ICD-10-CM

## 2020-04-17 DIAGNOSIS — F41.9 ANXIETY AND DEPRESSION: ICD-10-CM

## 2020-04-17 DIAGNOSIS — Z3A.16 16 WEEKS GESTATION OF PREGNANCY: ICD-10-CM

## 2020-04-17 LAB
SL AMB  POCT GLUCOSE, UA: ABNORMAL
SL AMB LEUKOCYTE ESTERASE,UA: ABNORMAL
SL AMB POCT BILIRUBIN,UA: ABNORMAL
SL AMB POCT BLOOD,UA: ABNORMAL
SL AMB POCT KETONES,UA: ABNORMAL
SL AMB POCT NITRITE,UA: ABNORMAL
SL AMB POCT PH,UA: ABNORMAL
SL AMB POCT SPECIFIC GRAVITY,UA: ABNORMAL
SL AMB POCT URINE PROTEIN: ABNORMAL
SL AMB POCT UROBILINOGEN: ABNORMAL

## 2020-04-17 PROCEDURE — 99213 OFFICE O/P EST LOW 20 MIN: CPT | Performed by: OBSTETRICS & GYNECOLOGY

## 2020-05-04 PROBLEM — Z03.75 ENCOUNTER FOR SUSPECTED CERVICAL SHORTENING RULED OUT: Status: ACTIVE | Noted: 2020-05-04

## 2020-05-04 PROBLEM — Z3A.18 18 WEEKS GESTATION OF PREGNANCY: Status: ACTIVE | Noted: 2020-03-27

## 2020-05-04 PROBLEM — Z36.3 ENCOUNTER FOR ANTENATAL SCREENING FOR MALFORMATIONS: Status: ACTIVE | Noted: 2020-05-04

## 2020-05-05 ENCOUNTER — TELEPHONE (OUTPATIENT)
Dept: PERINATAL CARE | Facility: CLINIC | Age: 25
End: 2020-05-05

## 2020-05-06 ENCOUNTER — ROUTINE PRENATAL (OUTPATIENT)
Dept: PERINATAL CARE | Facility: CLINIC | Age: 25
End: 2020-05-06
Payer: COMMERCIAL

## 2020-05-06 ENCOUNTER — OB ABSTRACT (OUTPATIENT)
Dept: OBGYN CLINIC | Facility: CLINIC | Age: 25
End: 2020-05-06

## 2020-05-06 ENCOUNTER — TELEMEDICINE (OUTPATIENT)
Dept: PERINATAL CARE | Facility: CLINIC | Age: 25
End: 2020-05-06

## 2020-05-06 VITALS
DIASTOLIC BLOOD PRESSURE: 63 MMHG | BODY MASS INDEX: 24.86 KG/M2 | TEMPERATURE: 97.6 F | HEART RATE: 89 BPM | WEIGHT: 149.2 LBS | HEIGHT: 65 IN | SYSTOLIC BLOOD PRESSURE: 139 MMHG

## 2020-05-06 DIAGNOSIS — Z3A.18 18 WEEKS GESTATION OF PREGNANCY: ICD-10-CM

## 2020-05-06 DIAGNOSIS — O35.8XX0 ANOMALY OF FETAL HEART AFFECTING SINGLETON PREGNANCY, ANTEPARTUM: ICD-10-CM

## 2020-05-06 DIAGNOSIS — Z03.75 ENCOUNTER FOR SUSPECTED CERVICAL SHORTENING RULED OUT: ICD-10-CM

## 2020-05-06 DIAGNOSIS — O46.92 VAGINAL BLEEDING IN PREGNANCY, SECOND TRIMESTER: ICD-10-CM

## 2020-05-06 DIAGNOSIS — Z3A.18 18 WEEKS GESTATION OF PREGNANCY: Primary | ICD-10-CM

## 2020-05-06 DIAGNOSIS — Z36.3 ENCOUNTER FOR ANTENATAL SCREENING FOR MALFORMATIONS: ICD-10-CM

## 2020-05-06 DIAGNOSIS — O09.219 PREVIOUS PRETERM DELIVERY, ANTEPARTUM: Primary | ICD-10-CM

## 2020-05-06 PROBLEM — O35.BXX0 ANOMALY OF FETAL HEART AFFECTING SINGLETON PREGNANCY, ANTEPARTUM: Status: ACTIVE | Noted: 2020-05-06

## 2020-05-06 PROCEDURE — 76811 OB US DETAILED SNGL FETUS: CPT | Performed by: OBSTETRICS & GYNECOLOGY

## 2020-05-06 PROCEDURE — 76817 TRANSVAGINAL US OBSTETRIC: CPT | Performed by: OBSTETRICS & GYNECOLOGY

## 2020-05-06 PROCEDURE — 99214 OFFICE O/P EST MOD 30 MIN: CPT | Performed by: OBSTETRICS & GYNECOLOGY

## 2020-05-06 PROCEDURE — NC001 PR NO CHARGE: Performed by: OBSTETRICS & GYNECOLOGY

## 2020-05-07 ENCOUNTER — DOCUMENTATION (OUTPATIENT)
Dept: PERINATAL CARE | Facility: CLINIC | Age: 25
End: 2020-05-07

## 2020-05-07 ENCOUNTER — TELEPHONE (OUTPATIENT)
Dept: PERINATAL CARE | Facility: CLINIC | Age: 25
End: 2020-05-07

## 2020-05-15 ENCOUNTER — OB ABSTRACT (OUTPATIENT)
Dept: PERINATAL CARE | Facility: CLINIC | Age: 25
End: 2020-05-15

## 2020-05-15 DIAGNOSIS — Z3A.18 18 WEEKS GESTATION OF PREGNANCY: ICD-10-CM

## 2020-05-15 DIAGNOSIS — F32.A ANXIETY AND DEPRESSION: ICD-10-CM

## 2020-05-15 DIAGNOSIS — O46.92 VAGINAL BLEEDING IN PREGNANCY, SECOND TRIMESTER: ICD-10-CM

## 2020-05-15 DIAGNOSIS — F41.9 ANXIETY AND DEPRESSION: ICD-10-CM

## 2020-05-19 ENCOUNTER — TELEPHONE (OUTPATIENT)
Dept: PERINATAL CARE | Facility: CLINIC | Age: 25
End: 2020-05-19

## 2020-05-20 ENCOUNTER — TELEMEDICINE (OUTPATIENT)
Dept: OBGYN CLINIC | Facility: CLINIC | Age: 25
End: 2020-05-20
Payer: COMMERCIAL

## 2020-05-20 ENCOUNTER — TELEPHONE (OUTPATIENT)
Dept: PERINATAL CARE | Facility: CLINIC | Age: 25
End: 2020-05-20

## 2020-05-20 VITALS
HEIGHT: 65 IN | SYSTOLIC BLOOD PRESSURE: 139 MMHG | BODY MASS INDEX: 24.83 KG/M2 | WEIGHT: 149 LBS | DIASTOLIC BLOOD PRESSURE: 63 MMHG

## 2020-05-20 DIAGNOSIS — F41.9 ANXIETY AND DEPRESSION: ICD-10-CM

## 2020-05-20 DIAGNOSIS — F32.A ANXIETY AND DEPRESSION: ICD-10-CM

## 2020-05-20 DIAGNOSIS — O46.92 VAGINAL BLEEDING IN PREGNANCY, SECOND TRIMESTER: ICD-10-CM

## 2020-05-20 DIAGNOSIS — Z3A.18 18 WEEKS GESTATION OF PREGNANCY: ICD-10-CM

## 2020-05-20 PROCEDURE — 99214 OFFICE O/P EST MOD 30 MIN: CPT | Performed by: OBSTETRICS & GYNECOLOGY

## 2020-05-21 ENCOUNTER — TELEPHONE (OUTPATIENT)
Dept: PERINATAL CARE | Facility: CLINIC | Age: 25
End: 2020-05-21

## 2020-05-22 ENCOUNTER — TELEPHONE (OUTPATIENT)
Dept: PERINATAL CARE | Facility: OTHER | Age: 25
End: 2020-05-22

## 2020-05-22 DIAGNOSIS — O35.8XX0 ANOMALY OF FETAL HEART AFFECTING SINGLETON PREGNANCY, ANTEPARTUM: ICD-10-CM

## 2020-05-22 DIAGNOSIS — Z3A.21 21 WEEKS GESTATION OF PREGNANCY: Primary | ICD-10-CM

## 2020-05-22 DIAGNOSIS — O41.02X0 OLIGOHYDRAMNIOS IN SECOND TRIMESTER, SINGLE OR UNSPECIFIED FETUS: ICD-10-CM

## 2020-05-22 PROBLEM — O41.00X0 OLIGOHYDRAMNIOS: Status: ACTIVE | Noted: 2020-05-22

## 2020-05-27 ENCOUNTER — TELEPHONE (OUTPATIENT)
Dept: OBGYN CLINIC | Facility: CLINIC | Age: 25
End: 2020-05-27

## 2020-05-27 ENCOUNTER — TELEPHONE (OUTPATIENT)
Dept: PERINATAL CARE | Facility: CLINIC | Age: 25
End: 2020-05-27

## 2020-06-03 ENCOUNTER — TELEPHONE (OUTPATIENT)
Dept: FAMILY MEDICINE CLINIC | Facility: CLINIC | Age: 25
End: 2020-06-03

## 2020-06-04 ENCOUNTER — TELEPHONE (OUTPATIENT)
Dept: PERINATAL CARE | Facility: OTHER | Age: 25
End: 2020-06-04

## 2020-06-09 ENCOUNTER — OB ABSTRACT (OUTPATIENT)
Dept: OBGYN CLINIC | Facility: CLINIC | Age: 25
End: 2020-06-09

## 2020-07-28 ENCOUNTER — HOSPITAL ENCOUNTER (EMERGENCY)
Facility: HOSPITAL | Age: 25
Discharge: HOME | End: 2020-07-28
Attending: EMERGENCY MEDICINE | Admitting: EMERGENCY MEDICINE
Payer: COMMERCIAL

## 2020-07-28 ENCOUNTER — APPOINTMENT (EMERGENCY)
Dept: RADIOLOGY | Facility: HOSPITAL | Age: 25
End: 2020-07-28
Payer: COMMERCIAL

## 2020-07-28 VITALS
WEIGHT: 148.81 LBS | BODY MASS INDEX: 24.79 KG/M2 | TEMPERATURE: 98.2 F | RESPIRATION RATE: 16 BRPM | HEART RATE: 79 BPM | DIASTOLIC BLOOD PRESSURE: 66 MMHG | OXYGEN SATURATION: 98 % | HEIGHT: 65 IN | SYSTOLIC BLOOD PRESSURE: 134 MMHG

## 2020-07-28 DIAGNOSIS — M79.672 LEFT FOOT PAIN: Primary | ICD-10-CM

## 2020-07-28 PROCEDURE — 99283 EMERGENCY DEPT VISIT LOW MDM: CPT

## 2020-07-28 PROCEDURE — 99282 EMERGENCY DEPT VISIT SF MDM: CPT | Performed by: EMERGENCY MEDICINE

## 2020-07-28 PROCEDURE — 73630 X-RAY EXAM OF FOOT: CPT

## 2020-07-28 NOTE — ED ATTENDING ATTESTATION
7/28/2020  I, Odell Calhoun MD, saw and evaluated the patient  I have discussed the patient with the resident/non-physician practitioner and agree with the resident's/non-physician practitioner's findings, Plan of Care, and MDM as documented in the resident's/non-physician practitioner's note, except where noted  All available labs and Radiology studies were reviewed  I was present for key portions of any procedure(s) performed by the resident/non-physician practitioner and I was immediately available to provide assistance  At this point I agree with the current assessment done in the Emergency Department  I have conducted an independent evaluation of this patient a history and physical is as follows:      Patient presents the emergency department with a persistent foreign body sensation in her left foot since May of this year  The patient states she believes she stepped on some glass at that time but believe she was able to get all the glass out of her foot  Since that time, though, the patient has felt as though there is something in her left foot  The patient denies any redness, warmth, or swelling  The patient reports mild pain with ambulation  Physical exam demonstrates a female no acute distress  The left foot has no erythema, warmth, swelling, or induration  There is mild tenderness over the plantar aspect of the metatarsophalangeal joint and the area of the 2nd and 3rd toes  There is a small callus in the area but no evidence of a puncture wound or palpable foreign body  The foot was neurovascularly intact    The right lower extremity was normal   ED Course         Critical Care Time  Procedures

## 2020-07-28 NOTE — ED PROVIDER NOTES
History  Chief Complaint   Patient presents with    Foreign Body in Skin     Pt states that two weeks ago she got a peice of glass stuyck in her R foot  Pt states she attempted to remove it with a butter knife  Pt states she got it out out is still having a lot of pain at rick site  Pt foot R is swollen and tender upon palpation  HPI     17-year-old female presents for evaluation of left foot pain  Patient states approximately 2 and half months ago, she stepped on a piece of glass on her left foot  Immediately afterwards, she scraped the glass out with a butter knife  Since then, she has still been having pain in the bottom of left foot especially with ambulation  She states she feels like she has pus in the bottom of the foot but denies any drainage  She states she also feels like she still has a piece of glass in foot  Pain is increased with ambulation  Denies swelling or erythema of the foot  Denies any pain or swelling up the leg or into the calf  Denies any fevers, chills, nausea, vomiting or diarrhea  Patient states she delayed presentation to the hospital because the last time she was here was approximately 3 months ago for a fetal demise at 6 months and coming back to the hospital has been hard for her emotionally  Denies any history of diabetes or vascular problems in her legs  Prior to Admission Medications   Prescriptions Last Dose Informant Patient Reported? Taking?    Prenatal Vit-Fe Fumarate-FA (PRENATAL MULTIVITAMIN) 28-0 8 MG TABS  Self Yes No   Sig: Take 1 tablet by mouth daily   busPIRone (BUSPAR) 15 mg tablet   No No   Sig: Take 1 tablet (15 mg total) by mouth 2 (two) times a day   metoclopramide (REGLAN) 10 mg tablet  Self Yes Yes   Sig: Take 10 mg by mouth 4 (four) times a day      Facility-Administered Medications: None       Past Medical History:   Diagnosis Date    Anemia     on iron    Anxiety and depression 3/17/2020    Arthritis     Right leg    Eating disorder     Migraines     Frequent migraines, seems to affect the left side of her body   Papanicolaou smear for cervical cancer screening 12/27/2019    Post partum depression     Psychiatric disorder     ptsd    Seasonal allergies        Past Surgical History:   Procedure Laterality Date    DILATION AND CURETTAGE OF UTERUS         Family History   Problem Relation Age of Onset    Hypertension Mother     Anemia Mother     Diabetes Mother     ALISON disease Mother     Other Mother         Cervical Disc disorder, Multi nerve injury    Carpal tunnel syndrome Mother     Heart disease Father     Hyperlipidemia Father     Hypertension Maternal Grandmother     Diabetes Maternal Grandmother     Hyperlipidemia Maternal Grandfather     Hypertension Maternal Aunt     Diabetes Maternal Aunt      I have reviewed and agree with the history as documented  E-Cigarette/Vaping    E-Cigarette Use Never User      E-Cigarette/Vaping Substances     Social History     Tobacco Use    Smoking status: Former Smoker     Years: 5 00    Smokeless tobacco: Never Used   Substance Use Topics    Alcohol use: Never     Frequency: Never    Drug use: Never        Review of Systems   Constitutional: Negative for appetite change, chills, fatigue, fever and unexpected weight change  HENT: Negative for congestion, rhinorrhea and sore throat  Eyes: Negative for visual disturbance  Respiratory: Negative for cough and shortness of breath  Cardiovascular: Negative for chest pain, palpitations and leg swelling  Gastrointestinal: Negative for abdominal distention, abdominal pain, diarrhea, nausea and vomiting  Genitourinary: Negative for dysuria  Musculoskeletal: Negative for arthralgias, joint swelling and myalgias  Skin: Negative for color change and rash  Neurological: Negative for dizziness, weakness, light-headedness, numbness and headaches  All other systems reviewed and are negative        Physical Exam  ED Triage Vitals [07/28/20 0825]   Temperature Pulse Respirations Blood Pressure SpO2   98 2 °F (36 8 °C) 79 16 134/66 98 %      Temp Source Heart Rate Source Patient Position - Orthostatic VS BP Location FiO2 (%)   Oral -- Lying Right arm --      Pain Score       Worst Possible Pain             Orthostatic Vital Signs  Vitals:    07/28/20 0825   BP: 134/66   Pulse: 79   Patient Position - Orthostatic VS: Lying       Physical Exam   Constitutional: She is oriented to person, place, and time  She appears well-developed and well-nourished  No distress  HENT:   Head: Normocephalic and atraumatic  Eyes: Conjunctivae and EOM are normal    Neck: Neck supple  Cardiovascular: Normal rate, regular rhythm, normal heart sounds and intact distal pulses  Exam reveals no gallop and no friction rub  No murmur heard  Pulmonary/Chest: Effort normal and breath sounds normal  No respiratory distress  She has no wheezes  She has no rales  Abdominal: Soft  She exhibits no distension  There is no tenderness  There is no rebound and no guarding  Musculoskeletal: She exhibits no edema or tenderness  No edema in the left foot or lower extremity   Neurological: She is alert and oriented to person, place, and time  She has normal strength  No cranial nerve deficit or sensory deficit  Motor and sensation intact in the left foot and toes  Skin: Skin is warm and dry  Capillary refill takes less than 2 seconds  No rash noted  She is not diaphoretic  No erythema  Mild tenderness over the plantar aspect of the left foot in the area of the metatarsalphalangeal joint of the 2nd and 3rd toes  Small callus present but no fluctuance or surrounding erythema  Psychiatric: She has a normal mood and affect  Her behavior is normal    Nursing note and vitals reviewed        ED Medications  Medications - No data to display    Diagnostic Studies  Results Reviewed     None                 XR foot 3+ views LEFT   Final Result by Qian Lara MD (07/28 8131)      No acute displaced fracture or dislocation  Workstation performed: [id number]               Procedures  Procedures      ED Course       US AUDIT      Most Recent Value   Initial Alcohol Screen: US AUDIT-C    1  How often do you have a drink containing alcohol?  0 Filed at: 07/28/2020 0914   2  How many drinks containing alcohol do you have on a typical day you are drinking? 0 Filed at: 07/28/2020 0914   3a  Male UNDER 65: How often do you have five or more drinks on one occasion? 0 Filed at: 07/28/2020 0914   3b  FEMALE Any Age, or MALE 65+: How often do you have 4 or more drinks on one occassion? 0 Filed at: 07/28/2020 0914   Audit-C Score  0 Filed at: 07/28/2020 6743                  TERRELL/DAST-10      Most Recent Value   How many times in the past year have you    Used an illegal drug or used a prescription medication for non-medical reasons? Never Filed at: 07/28/2020 1154                              MDM    20-year-old female presents for evaluation of left foot pain  She has stepped on a piece of glass approximately 2 and half months ago which she removed herself at that time  However she still feels like she has a piece of glass in the bottom of the foot has had pain with ambulation  Differential diagnosis includes: retained foreign body  No surrounding erythema or fluctuance, less likely to be abscess or cellulitis without surrounding erythema  Will not treat with antibiotics at this time  Obtain x-ray of left foot to evaluate for retained foreign body  No foreign body visualized on Xray  Will have patient follow up Podiatry  Instructed to call podiatry within the next 1 week to make appointment for follow up  Patient provided with strict return precautions including if she starts to develop swelling or redness of the foot or the leg  Patient expressed understanding  Patient was upset that we were not going to drain her foot   I explained that I did not see anything on the Xray and did not feel any fluctuance or area of pus to drain on her foot on exam   I explained that without seeing a foreign body on xray, incising her foot to look for a foreign body will likely cause more harm  She needs to follow up with podiatry who may use ultrasound or other imaging modalities to look for foreign body  She was provided contact information for podiatry  Disposition  Final diagnoses:   Left foot pain     Time reflects when diagnosis was documented in both MDM as applicable and the Disposition within this note     Time User Action Codes Description Comment    7/28/2020  9:10 AM Radhayao Ambriz Add [J25 867] Left foot pain       ED Disposition     ED Disposition Condition Date/Time Comment    Discharge Stable Tue Jul 28, 2020  9:18 AM Kannan Jones discharge to home/self care  Follow-up Information     Follow up With Specialties Details Why Contact Info Additional Information    [facility] Schedule an appointment as soon as possible for a visit in 1 week Please call to make an appointment in the next 1 week  [address] [place], [place] [id number]  [address]  [facility]          Discharge Medication List as of 7/28/2020  9:19 AM      CONTINUE these medications which have NOT CHANGED    Details   metoclopramide (REGLAN) 10 mg tablet Take 10 mg by mouth 4 (four) times a day, Historical Med      busPIRone (BUSPAR) 15 mg tablet Take 1 tablet (15 mg total) by mouth 2 (two) times a day, Starting Fri 3/6/2020, Until Sun 4/5/2020, Normal      Prenatal Vit-Fe Fumarate-FA (PRENATAL MULTIVITAMIN) 28-0 8 MG TABS Take 1 tablet by mouth daily, Historical Med           No discharge procedures on file  PDMP Review     None           ED Provider  Attending physically available and evaluated Kannan Jones  I managed the patient along with the ED Attending      Electronically Signed by         Mahogany Daniel MD  07/28/20 2031

## 2020-07-28 NOTE — DISCHARGE INSTRUCTIONS
Please call Podiatry to make a follow up appointment within the next 1 week  Please return to the emergency department if you have redness or swelling int the foot or leg

## 2020-11-12 ENCOUNTER — TELEPHONE (OUTPATIENT)
Dept: OBGYN CLINIC | Facility: MEDICAL CENTER | Age: 25
End: 2020-11-12

## 2020-11-12 DIAGNOSIS — N91.2 AMENORRHEA: Primary | ICD-10-CM

## 2020-11-15 ENCOUNTER — APPOINTMENT (EMERGENCY)
Dept: RADIOLOGY | Facility: HOSPITAL | Age: 25
End: 2020-11-15
Payer: COMMERCIAL

## 2020-11-15 ENCOUNTER — HOSPITAL ENCOUNTER (EMERGENCY)
Facility: HOSPITAL | Age: 25
Discharge: HOME | End: 2020-11-15
Attending: EMERGENCY MEDICINE
Payer: COMMERCIAL

## 2020-11-15 VITALS
SYSTOLIC BLOOD PRESSURE: 130 MMHG | RESPIRATION RATE: 18 BRPM | HEART RATE: 98 BPM | TEMPERATURE: 98.1 F | DIASTOLIC BLOOD PRESSURE: 87 MMHG | WEIGHT: 140 LBS | OXYGEN SATURATION: 100 % | BODY MASS INDEX: 23.3 KG/M2

## 2020-11-15 DIAGNOSIS — Z34.90 INTRAUTERINE PREGNANCY: Primary | ICD-10-CM

## 2020-11-15 DIAGNOSIS — O46.90 VAGINAL BLEEDING DURING PREGNANCY: ICD-10-CM

## 2020-11-15 PROCEDURE — 76801 OB US < 14 WKS SINGLE FETUS: CPT

## 2020-11-15 PROCEDURE — 76802 OB US < 14 WKS ADDL FETUS: CPT

## 2020-11-15 PROCEDURE — 99284 EMERGENCY DEPT VISIT MOD MDM: CPT | Performed by: EMERGENCY MEDICINE

## 2020-11-15 PROCEDURE — 76805 OB US >/= 14 WKS SNGL FETUS: CPT | Performed by: EMERGENCY MEDICINE

## 2020-11-15 PROCEDURE — 99284 EMERGENCY DEPT VISIT MOD MDM: CPT

## 2020-11-21 ENCOUNTER — LAB (OUTPATIENT)
Dept: LAB | Facility: HOSPITAL | Age: 25
End: 2020-11-21
Attending: OBSTETRICS & GYNECOLOGY
Payer: COMMERCIAL

## 2020-11-21 DIAGNOSIS — N91.2 AMENORRHEA: ICD-10-CM

## 2020-11-21 LAB
B-HCG SERPL-ACNC: ABNORMAL MIU/ML
PROGEST SERPL-MCNC: 25.1 NG/ML

## 2020-11-21 PROCEDURE — 36415 COLL VENOUS BLD VENIPUNCTURE: CPT

## 2020-11-21 PROCEDURE — 84702 CHORIONIC GONADOTROPIN TEST: CPT

## 2020-11-21 PROCEDURE — 84144 ASSAY OF PROGESTERONE: CPT

## 2020-12-01 ENCOUNTER — ULTRASOUND (OUTPATIENT)
Dept: OBGYN CLINIC | Facility: CLINIC | Age: 25
End: 2020-12-01

## 2020-12-01 VITALS
BODY MASS INDEX: 20.89 KG/M2 | SYSTOLIC BLOOD PRESSURE: 129 MMHG | HEART RATE: 105 BPM | WEIGHT: 130 LBS | HEIGHT: 66 IN | DIASTOLIC BLOOD PRESSURE: 75 MMHG

## 2020-12-01 DIAGNOSIS — N91.2 AMENORRHEA: ICD-10-CM

## 2020-12-01 DIAGNOSIS — O09.299 HISTORY OF FETAL ANOMALY IN PRIOR PREGNANCY, CURRENTLY PREGNANT: ICD-10-CM

## 2020-12-01 DIAGNOSIS — O20.9 VAGINAL BLEEDING IN PREGNANCY, FIRST TRIMESTER: ICD-10-CM

## 2020-12-01 DIAGNOSIS — F32.A ANXIETY AND DEPRESSION: ICD-10-CM

## 2020-12-01 DIAGNOSIS — Z3A.10 10 WEEKS GESTATION OF PREGNANCY: ICD-10-CM

## 2020-12-01 DIAGNOSIS — O09.219 PREVIOUS PRETERM DELIVERY, ANTEPARTUM: Primary | ICD-10-CM

## 2020-12-01 DIAGNOSIS — F41.9 ANXIETY AND DEPRESSION: ICD-10-CM

## 2020-12-01 PROCEDURE — 99203 OFFICE O/P NEW LOW 30 MIN: CPT | Performed by: OBSTETRICS & GYNECOLOGY

## 2020-12-01 RX ORDER — FERROUS SULFATE 325(65) MG
325 TABLET ORAL
COMMUNITY
End: 2022-05-16

## 2020-12-01 RX ORDER — ACETAMINOPHEN 500 MG
1000 TABLET ORAL EVERY 8 HOURS PRN
COMMUNITY
Start: 2020-05-31 | End: 2022-02-07

## 2020-12-02 PROBLEM — O20.9 VAGINAL BLEEDING IN PREGNANCY, FIRST TRIMESTER: Status: ACTIVE | Noted: 2020-12-02

## 2020-12-02 PROBLEM — O09.299 HISTORY OF FETAL ANOMALY IN PRIOR PREGNANCY, CURRENTLY PREGNANT: Status: ACTIVE | Noted: 2020-05-06

## 2020-12-02 PROBLEM — Z36.3 ENCOUNTER FOR ANTENATAL SCREENING FOR MALFORMATIONS: Status: RESOLVED | Noted: 2020-05-04 | Resolved: 2020-12-02

## 2020-12-02 PROBLEM — O41.00X0 OLIGOHYDRAMNIOS: Status: RESOLVED | Noted: 2020-05-22 | Resolved: 2020-12-02

## 2020-12-02 PROBLEM — Z3A.21 21 WEEKS GESTATION OF PREGNANCY: Status: RESOLVED | Noted: 2020-03-27 | Resolved: 2020-12-02

## 2020-12-02 PROBLEM — Z3A.10 10 WEEKS GESTATION OF PREGNANCY: Status: ACTIVE | Noted: 2020-12-02

## 2020-12-02 PROBLEM — O46.92 VAGINAL BLEEDING IN PREGNANCY, SECOND TRIMESTER: Status: RESOLVED | Noted: 2020-02-14 | Resolved: 2020-12-02

## 2020-12-02 PROBLEM — Z03.75 ENCOUNTER FOR SUSPECTED CERVICAL SHORTENING RULED OUT: Status: RESOLVED | Noted: 2020-05-04 | Resolved: 2020-12-02

## 2020-12-02 PROBLEM — O20.9 FIRST TRIMESTER BLEEDING: Status: RESOLVED | Noted: 2020-02-14 | Resolved: 2020-12-02

## 2020-12-09 ENCOUNTER — TELEPHONE (OUTPATIENT)
Dept: OBGYN CLINIC | Facility: CLINIC | Age: 25
End: 2020-12-09

## 2020-12-09 DIAGNOSIS — Z34.90 PREGNANCY AT EARLY STAGE: Primary | ICD-10-CM

## 2020-12-09 RX ORDER — SWAB
1 SWAB, NON-MEDICATED MISCELLANEOUS DAILY
Qty: 100 EACH | Refills: 3 | Status: SHIPPED | OUTPATIENT
Start: 2020-12-09 | End: 2022-02-07

## 2020-12-16 ENCOUNTER — INITIAL PRENATAL (OUTPATIENT)
Dept: OBGYN CLINIC | Facility: CLINIC | Age: 25
End: 2020-12-16

## 2020-12-16 VITALS
BODY MASS INDEX: 21.63 KG/M2 | HEART RATE: 83 BPM | SYSTOLIC BLOOD PRESSURE: 133 MMHG | WEIGHT: 134.6 LBS | HEIGHT: 66 IN | DIASTOLIC BLOOD PRESSURE: 73 MMHG

## 2020-12-16 DIAGNOSIS — Z87.59 HISTORY OF PRETERM PREMATURE RUPTURE OF MEMBRANES (PPROM): ICD-10-CM

## 2020-12-16 DIAGNOSIS — Z3A.12 12 WEEKS GESTATION OF PREGNANCY: Primary | ICD-10-CM

## 2020-12-16 PROCEDURE — T1001 NURSING ASSESSMENT/EVALUATN: HCPCS

## 2020-12-18 ENCOUNTER — TELEPHONE (OUTPATIENT)
Dept: PERINATAL CARE | Facility: CLINIC | Age: 25
End: 2020-12-18

## 2020-12-19 ENCOUNTER — ROUTINE PRENATAL (OUTPATIENT)
Dept: PERINATAL CARE | Facility: CLINIC | Age: 25
End: 2020-12-19
Payer: COMMERCIAL

## 2020-12-19 VITALS
HEART RATE: 70 BPM | WEIGHT: 134 LBS | DIASTOLIC BLOOD PRESSURE: 56 MMHG | BODY MASS INDEX: 21.53 KG/M2 | SYSTOLIC BLOOD PRESSURE: 98 MMHG | HEIGHT: 66 IN

## 2020-12-19 DIAGNOSIS — O09.219 PREVIOUS PRETERM DELIVERY, ANTEPARTUM: Primary | ICD-10-CM

## 2020-12-19 DIAGNOSIS — Z3A.12 12 WEEKS GESTATION OF PREGNANCY: ICD-10-CM

## 2020-12-19 DIAGNOSIS — Z36.82 ENCOUNTER FOR NUCHAL TRANSLUCENCY TESTING: ICD-10-CM

## 2020-12-19 DIAGNOSIS — Z3A.13 13 WEEKS GESTATION OF PREGNANCY: ICD-10-CM

## 2020-12-19 DIAGNOSIS — Z87.59 HISTORY OF PRETERM PREMATURE RUPTURE OF MEMBRANES (PPROM): ICD-10-CM

## 2020-12-19 PROBLEM — D56.3 CARRIER OF BETA THALASSEMIA: Status: ACTIVE | Noted: 2020-05-29

## 2020-12-19 PROBLEM — F41.9 ANXIETY DURING PREGNANCY, ANTEPARTUM: Status: ACTIVE | Noted: 2020-05-29

## 2020-12-19 PROBLEM — O20.9 VAGINAL BLEEDING IN PREGNANCY, FIRST TRIMESTER: Status: RESOLVED | Noted: 2020-12-02 | Resolved: 2020-12-19

## 2020-12-19 PROBLEM — O99.340 ANXIETY DURING PREGNANCY, ANTEPARTUM: Status: ACTIVE | Noted: 2020-05-29

## 2020-12-19 PROCEDURE — 99214 OFFICE O/P EST MOD 30 MIN: CPT | Performed by: OBSTETRICS & GYNECOLOGY

## 2020-12-19 PROCEDURE — 76813 OB US NUCHAL MEAS 1 GEST: CPT | Performed by: OBSTETRICS & GYNECOLOGY

## 2020-12-28 ENCOUNTER — ROUTINE PRENATAL (OUTPATIENT)
Dept: OBGYN CLINIC | Facility: CLINIC | Age: 25
End: 2020-12-28

## 2020-12-28 VITALS
HEIGHT: 66 IN | HEART RATE: 90 BPM | DIASTOLIC BLOOD PRESSURE: 77 MMHG | WEIGHT: 136.8 LBS | BODY MASS INDEX: 21.98 KG/M2 | SYSTOLIC BLOOD PRESSURE: 116 MMHG

## 2020-12-28 DIAGNOSIS — Z30.09 ENCOUNTER FOR COUNSELING REGARDING CONTRACEPTION: ICD-10-CM

## 2020-12-28 DIAGNOSIS — Z11.3 SCREEN FOR STD (SEXUALLY TRANSMITTED DISEASE): ICD-10-CM

## 2020-12-28 DIAGNOSIS — Z3A.14 14 WEEKS GESTATION OF PREGNANCY: Primary | ICD-10-CM

## 2020-12-28 DIAGNOSIS — D56.3 CARRIER OF BETA THALASSEMIA: ICD-10-CM

## 2020-12-28 LAB
SL AMB  POCT GLUCOSE, UA: NEGATIVE
SL AMB POCT URINE PROTEIN: ABNORMAL

## 2020-12-28 PROCEDURE — 99213 OFFICE O/P EST LOW 20 MIN: CPT | Performed by: OBSTETRICS & GYNECOLOGY

## 2020-12-28 PROCEDURE — 87491 CHLMYD TRACH DNA AMP PROBE: CPT | Performed by: OBSTETRICS & GYNECOLOGY

## 2020-12-28 PROCEDURE — 81002 URINALYSIS NONAUTO W/O SCOPE: CPT | Performed by: OBSTETRICS & GYNECOLOGY

## 2020-12-28 PROCEDURE — 87591 N.GONORRHOEAE DNA AMP PROB: CPT | Performed by: OBSTETRICS & GYNECOLOGY

## 2020-12-29 LAB
C TRACH DNA SPEC QL NAA+PROBE: NEGATIVE
N GONORRHOEA DNA SPEC QL NAA+PROBE: NEGATIVE

## 2021-01-12 ENCOUNTER — TELEPHONE (OUTPATIENT)
Dept: PERINATAL CARE | Facility: CLINIC | Age: 26
End: 2021-01-12

## 2021-01-12 NOTE — TELEPHONE ENCOUNTER
Attempted to reach patient by phone and left voicemail to confirm appointment for MFM ultrasound  1 support person ( must be over the age of 15) may accompany you for your appointment  If you or your support person have traveled outside the state in the past 2 weeks, please call and notify our office today #[phone]  You and your support person must wear a mask ,covering nose and mouth,during your entire visit  To minimize your exposure in our waiting room, please call our office prior to entering the building  Check in and rooming questions will be done via phone  We will give you directions when to enter for your appointment  [place]: [phone]    IF you are not feeling well- cough, fever, shortness of breath or any flu like symptoms, contact your primary care physician or [phone]  If you are awaiting COVID 19 test results please call and reschedule your appointment    Any questions with these instructions please call Maternal Fetal Medicine nurse line today @ # [phone]

## 2021-01-13 ENCOUNTER — ROUTINE PRENATAL (OUTPATIENT)
Dept: PERINATAL CARE | Facility: CLINIC | Age: 26
End: 2021-01-13
Payer: COMMERCIAL

## 2021-01-13 VITALS
HEIGHT: 66 IN | HEART RATE: 78 BPM | DIASTOLIC BLOOD PRESSURE: 59 MMHG | SYSTOLIC BLOOD PRESSURE: 112 MMHG | WEIGHT: 149.2 LBS | BODY MASS INDEX: 23.98 KG/M2

## 2021-01-13 DIAGNOSIS — Z3A.16 16 WEEKS GESTATION OF PREGNANCY: ICD-10-CM

## 2021-01-13 DIAGNOSIS — Z36.86 ENCOUNTER FOR ANTENATAL SCREENING FOR CERVICAL LENGTH: Primary | ICD-10-CM

## 2021-01-13 DIAGNOSIS — O09.219 PREVIOUS PRETERM DELIVERY, ANTEPARTUM: ICD-10-CM

## 2021-01-13 PROCEDURE — 99213 OFFICE O/P EST LOW 20 MIN: CPT | Performed by: OBSTETRICS & GYNECOLOGY

## 2021-01-13 PROCEDURE — 76815 OB US LIMITED FETUS(S): CPT | Performed by: OBSTETRICS & GYNECOLOGY

## 2021-01-13 PROCEDURE — 76817 TRANSVAGINAL US OBSTETRIC: CPT | Performed by: OBSTETRICS & GYNECOLOGY

## 2021-01-13 NOTE — LETTER
January 13, 2021     [place], [address] [address]    Patient: Jane Hinds   YOB: 1995   Date of Visit: 1/13/2021       Dear Dr Rhona Cochran: Coral Tomas has decided to pursue the [facility]  She was undecided until today  Can you please make sure it gets ordered for her? She is 16w5d and it should be initiated no later than 20 weeks  Prior authorization may be needed  Sincerely,        Kennedi Naranjo MD        CC: No Recipients  Kennedi Nraanjo MD  1/13/2021  1:35 PM  Sign when Signing Visit  [id number] [place] [address] Road: Ms Cristal Sullivan was seen today at 16w5d for serial cervical length screening ultrasound  See ultrasound report under "OB Procedures" tab    Please don't hesitate to contact our office with any concerns or questions   -Kennedi Naranjo MD

## 2021-01-13 NOTE — PROGRESS NOTES
[address]: Ms Maynor Watt was seen today at 16w5d for serial cervical length screening ultrasound  See ultrasound report under "OB Procedures" tab    Please don't hesitate to contact our office with any concerns or questions   -Elena Bustamante MD

## 2021-01-25 ENCOUNTER — ROUTINE PRENATAL (OUTPATIENT)
Dept: OBGYN CLINIC | Facility: CLINIC | Age: 26
End: 2021-01-25

## 2021-01-25 ENCOUNTER — LAB (OUTPATIENT)
Dept: LAB | Facility: HOSPITAL | Age: 26
End: 2021-01-25
Payer: COMMERCIAL

## 2021-01-25 ENCOUNTER — TELEPHONE (OUTPATIENT)
Dept: OBGYN CLINIC | Facility: CLINIC | Age: 26
End: 2021-01-25

## 2021-01-25 VITALS
HEART RATE: 93 BPM | HEIGHT: 66 IN | BODY MASS INDEX: 23.95 KG/M2 | WEIGHT: 149 LBS | DIASTOLIC BLOOD PRESSURE: 78 MMHG | SYSTOLIC BLOOD PRESSURE: 129 MMHG

## 2021-01-25 DIAGNOSIS — O09.899 H/O PRETERM DELIVERY, CURRENTLY PREGNANT: ICD-10-CM

## 2021-01-25 DIAGNOSIS — Z3A.12 12 WEEKS GESTATION OF PREGNANCY: ICD-10-CM

## 2021-01-25 DIAGNOSIS — K42.9 UMBILICAL HERNIA WITHOUT OBSTRUCTION AND WITHOUT GANGRENE: Primary | ICD-10-CM

## 2021-01-25 LAB
ABO GROUP BLD: NORMAL
BACTERIA UR QL AUTO: ABNORMAL /HPF
BASOPHILS # BLD AUTO: 0.03 THOUSANDS/ΜL (ref 0–0.1)
BASOPHILS NFR BLD AUTO: 1 % (ref 0–1)
BILIRUB UR QL STRIP: NEGATIVE
BLD GP AB SCN SERPL QL: NEGATIVE
CAOX CRY URNS QL MICRO: ABNORMAL /HPF
CLARITY UR: CLEAR
COLOR UR: YELLOW
EOSINOPHIL # BLD AUTO: 0.04 THOUSAND/ΜL (ref 0–0.61)
EOSINOPHIL NFR BLD AUTO: 1 % (ref 0–6)
ERYTHROCYTE [DISTWIDTH] IN BLOOD BY AUTOMATED COUNT: 14.6 % (ref 11.6–15.1)
GLUCOSE UR STRIP-MCNC: NEGATIVE MG/DL
HBV SURFACE AG SER QL: NORMAL
HCT VFR BLD AUTO: 36.7 % (ref 34.8–46.1)
HGB BLD-MCNC: 11.4 G/DL (ref 11.5–15.4)
HGB UR QL STRIP.AUTO: NEGATIVE
IMM GRANULOCYTES # BLD AUTO: 0.06 THOUSAND/UL (ref 0–0.2)
IMM GRANULOCYTES NFR BLD AUTO: 1 % (ref 0–2)
KETONES UR STRIP-MCNC: NEGATIVE MG/DL
LEUKOCYTE ESTERASE UR QL STRIP: ABNORMAL
LYMPHOCYTES # BLD AUTO: 1.75 THOUSANDS/ΜL (ref 0.6–4.47)
LYMPHOCYTES NFR BLD AUTO: 32 % (ref 14–44)
MCH RBC QN AUTO: 29.7 PG (ref 26.8–34.3)
MCHC RBC AUTO-ENTMCNC: 31.1 G/DL (ref 31.4–37.4)
MCV RBC AUTO: 96 FL (ref 82–98)
MONOCYTES # BLD AUTO: 0.42 THOUSAND/ΜL (ref 0.17–1.22)
MONOCYTES NFR BLD AUTO: 8 % (ref 4–12)
NEUTROPHILS # BLD AUTO: 3.15 THOUSANDS/ΜL (ref 1.85–7.62)
NEUTS SEG NFR BLD AUTO: 57 % (ref 43–75)
NITRITE UR QL STRIP: NEGATIVE
NON-SQ EPI CELLS URNS QL MICRO: ABNORMAL /HPF
NRBC BLD AUTO-RTO: 0 /100 WBCS
PH UR STRIP.AUTO: 6 [PH]
PLATELET # BLD AUTO: 199 THOUSANDS/UL (ref 149–390)
PMV BLD AUTO: 9.6 FL (ref 8.9–12.7)
PROT UR STRIP-MCNC: NEGATIVE MG/DL
RBC # BLD AUTO: 3.84 MILLION/UL (ref 3.81–5.12)
RBC #/AREA URNS AUTO: ABNORMAL /HPF
RH BLD: POSITIVE
RUBV IGG SERPL IA-ACNC: >175 IU/ML
SP GR UR STRIP.AUTO: 1.02 (ref 1–1.03)
SPECIMEN EXPIRATION DATE: NORMAL
UROBILINOGEN UR QL STRIP.AUTO: 0.2 E.U./DL
WBC # BLD AUTO: 5.45 THOUSAND/UL (ref 4.31–10.16)
WBC #/AREA URNS AUTO: ABNORMAL /HPF

## 2021-01-25 PROCEDURE — 96372 THER/PROPH/DIAG INJ SC/IM: CPT

## 2021-01-25 PROCEDURE — 87086 URINE CULTURE/COLONY COUNT: CPT

## 2021-01-25 PROCEDURE — 99213 OFFICE O/P EST LOW 20 MIN: CPT | Performed by: OBSTETRICS & GYNECOLOGY

## 2021-01-25 PROCEDURE — 81001 URINALYSIS AUTO W/SCOPE: CPT

## 2021-01-25 PROCEDURE — 80081 OBSTETRIC PANEL INC HIV TSTG: CPT

## 2021-01-25 PROCEDURE — 36415 COLL VENOUS BLD VENIPUNCTURE: CPT

## 2021-01-25 NOTE — PROGRESS NOTES
[place]   Start at 16 weeks up until 36wks 6 days (end date): 06/03/2021  o 275 mg subcutaneous injection, weekly   Gestational age: 22 weeks  3 days     Side effects: no   Given by, site: Kristie Miles RN  o  first injection    Site: Left arm SQ

## 2021-01-25 NOTE — PROGRESS NOTES
Pt is a 23yo [id number] at 18w3d presenting for routine prenatal visit  She denies vaginal bleeding, loss of fluid, contractions/abdominal pain  She states she will occasional pain with her umbilical hernia, however, she reports sometimes it will hurt for hours and she will have to do different maneuvers to reduce it  She states she was going to see a general surgeon previously but didn't because of her pregnancy and did not want an Xray, but she is open to seeing one now  Vitals:    01/25/21 1000   BP: 129/78   Pulse: 93       1) Continue prenatal care  Hx of PPROM with fetal demise at 23wk, PTD: Pt currently seeing [facility] for cervical length surveillance  CL 3 92 on 1/13/21 Has not initiated [place], however, interested now and 1st injection was given today; pt completed paperwork for future injections  Next MFM appointment 1/29/21 for Level II US  PTL precautions reviewed  Strongly encouraged patient to have prenatal labs drawn   She states she will go after this visit  Umbilical hernia- currently not incarcerated, but referral to General surgery provided  Contraception: contemplating Nexplanon  Continue to encourage tobacco cessation   RTO in 1 week for Pat injection & 4 weeks for PN visit    Chelsea Colunga MD  OBGYN, PGY-4  1/25/2021 12:01 PM

## 2021-01-26 LAB
BACTERIA UR CULT: NORMAL
HIV 1+2 AB+HIV1 P24 AG SERPL QL IA: NORMAL
RPR SER QL: NORMAL

## 2021-01-28 ENCOUNTER — TELEPHONE (OUTPATIENT)
Dept: PERINATAL CARE | Facility: CLINIC | Age: 26
End: 2021-01-28

## 2021-01-28 NOTE — TELEPHONE ENCOUNTER
Spoke with patient and confirmed appointment with M  1 support person ( must be over age of 15) may accompany patient  Will you and your support person be able to wear a mask ,without a valve , during entire appointment? YES   To minimize your exposure in our waiting area,check in and rooming questions will be done via phone  When you arrive in the parking lot please call the following inside line # prior to entering office:    [place]: [phone]    Have you or your support person traveled outside the state in the last 2 weeks? NO  If yes, what state did you travel to? Do you or your support person have:  Fever or flu- like symptoms? NO  Symptoms of upper respiratory infection like runny nose, sore throat or cough? NO  Do you have new headache that you have not had in the past?NO  Have you experienced any new shortness of breath recently? NO  Do you have any new loss of taste or smell? NO  Do you have any new diarrhea, nausea or vomiting? NO  Have you recently been in contact with anyone who has been sick or diagnosed with COVID-19 infection? NO  Have you been recommended to quarantine because of an exposure to a confirmed positive COVID19 person? NO  Have you recently been tested for COVID19? NO    Patient verbalized understanding of all instructions

## 2021-01-29 ENCOUNTER — ROUTINE PRENATAL (OUTPATIENT)
Dept: PERINATAL CARE | Facility: CLINIC | Age: 26
End: 2021-01-29
Payer: COMMERCIAL

## 2021-01-29 VITALS
DIASTOLIC BLOOD PRESSURE: 72 MMHG | BODY MASS INDEX: 24.72 KG/M2 | HEIGHT: 66 IN | SYSTOLIC BLOOD PRESSURE: 133 MMHG | WEIGHT: 153.8 LBS | HEART RATE: 68 BPM

## 2021-01-29 DIAGNOSIS — O09.892 HISTORY OF PREMATURE DELIVERY, CURRENTLY PREGNANT, SECOND TRIMESTER: Primary | ICD-10-CM

## 2021-01-29 DIAGNOSIS — Z3A.19 19 WEEKS GESTATION OF PREGNANCY: ICD-10-CM

## 2021-01-29 PROCEDURE — 99213 OFFICE O/P EST LOW 20 MIN: CPT | Performed by: OBSTETRICS & GYNECOLOGY

## 2021-01-29 PROCEDURE — 76817 TRANSVAGINAL US OBSTETRIC: CPT | Performed by: OBSTETRICS & GYNECOLOGY

## 2021-01-29 PROCEDURE — 76815 OB US LIMITED FETUS(S): CPT | Performed by: OBSTETRICS & GYNECOLOGY

## 2021-01-29 NOTE — LETTER
January 29, 2021     [address] PROVIDER    Patient: Socorro Hill   YOB: 1995   Date of Visit: 1/29/2021       Dear   Provider: Thank you for referring Kings Bravo to me for evaluation  Below are my notes for this consultation  If you have questions, please do not hesitate to call me  I look forward to following your patient along with you  Sincerely,        Samantha Valdivia MD        CC: No Recipients  Samantha Valdivia MD  1/29/2021 12:50 PM  Sign when Signing Visit  Please refer to the [facility] ultrasound report in Ob Procedures for additional information regarding today's visit

## 2021-01-29 NOTE — PROGRESS NOTES
Please refer to the [facility] ultrasound report in Ob Procedures for additional information regarding today's visit

## 2021-01-29 NOTE — PROGRESS NOTES
Ultrasound Probe Disinfection    A transvaginal ultrasound was performed  Prior to use, disinfection was performed with High Level Disinfection Process (Trophon)  Probe serial number B3: [id number] was used        Evaline Pals  01/29/21  10:23 AM

## 2021-02-08 ENCOUNTER — CLINICAL SUPPORT (OUTPATIENT)
Dept: OBGYN CLINIC | Facility: CLINIC | Age: 26
End: 2021-02-08

## 2021-02-08 DIAGNOSIS — O09.899 HISTORY OF PRETERM DELIVERY, CURRENTLY PREGNANT: Primary | ICD-10-CM

## 2021-02-08 PROCEDURE — 96372 THER/PROPH/DIAG INJ SC/IM: CPT

## 2021-02-08 RX ORDER — HYDROXYPROGESTERONE CAPROATE 250 MG/ML
250 INJECTION INTRAMUSCULAR
Status: SHIPPED | OUTPATIENT
Start: 2021-02-08

## 2021-02-08 RX ADMIN — HYDROXYPROGESTERONE CAPROATE 250 MG: 250 INJECTION INTRAMUSCULAR at 11:27

## 2021-02-08 NOTE — PROGRESS NOTES
[place]   Start at 16 weeks up until 36wks 6 days (end date):  o 1 mg/1ml weekly injection    Gestational age: 22 2wks     Side effects: None Reported   Given by: SAM Elias Site: Gabriel Lui

## 2021-02-15 ENCOUNTER — ROUTINE PRENATAL (OUTPATIENT)
Dept: PERINATAL CARE | Facility: CLINIC | Age: 26
End: 2021-02-15
Payer: COMMERCIAL

## 2021-02-15 VITALS
WEIGHT: 150 LBS | SYSTOLIC BLOOD PRESSURE: 101 MMHG | BODY MASS INDEX: 24.11 KG/M2 | HEIGHT: 66 IN | DIASTOLIC BLOOD PRESSURE: 51 MMHG | HEART RATE: 85 BPM

## 2021-02-15 DIAGNOSIS — O09.892 HISTORY OF PREMATURE DELIVERY, CURRENTLY PREGNANT, SECOND TRIMESTER: Primary | ICD-10-CM

## 2021-02-15 DIAGNOSIS — Z36.3 ENCOUNTER FOR ANTENATAL SCREENING FOR MALFORMATIONS: ICD-10-CM

## 2021-02-15 DIAGNOSIS — Z3A.21 21 WEEKS GESTATION OF PREGNANCY: ICD-10-CM

## 2021-02-15 PROCEDURE — 76805 OB US >/= 14 WKS SNGL FETUS: CPT | Performed by: OBSTETRICS & GYNECOLOGY

## 2021-02-15 PROCEDURE — 76817 TRANSVAGINAL US OBSTETRIC: CPT | Performed by: OBSTETRICS & GYNECOLOGY

## 2021-02-15 PROCEDURE — 99213 OFFICE O/P EST LOW 20 MIN: CPT | Performed by: OBSTETRICS & GYNECOLOGY

## 2021-02-15 RX ORDER — ASPIRIN 81 MG/1
162 TABLET, CHEWABLE ORAL DAILY
Qty: 180 TABLET | Refills: 1 | Status: SHIPPED | OUTPATIENT
Start: 2021-02-15 | End: 2021-06-02 | Stop reason: HOSPADM

## 2021-02-15 NOTE — PROGRESS NOTES
Please refer to the [facility] ultrasound report in Ob Procedures for additional information regarding today's visit

## 2021-02-15 NOTE — LETTER
February 15, 2021     [address] PROVIDER    Patient: Sarah Winchester   YOB: 1995   Date of Visit: 2/15/2021       Dear   Provider: Thank you for referring Romario Johnson to me for evaluation  Below are my notes for this consultation  If you have questions, please do not hesitate to call me  I look forward to following your patient along with you  Sincerely,        Ines Arvizu MD        CC: No Recipients  Ines Arvizu MD  2/15/2021  4:14 PM  Sign when Signing Visit   Please refer to the [facility] ultrasound report in Ob Procedures for additional information regarding today's visit

## 2021-02-16 ENCOUNTER — TELEPHONE (OUTPATIENT)
Dept: OBGYN CLINIC | Facility: CLINIC | Age: 26
End: 2021-02-16

## 2021-02-16 ENCOUNTER — CLINICAL SUPPORT (OUTPATIENT)
Dept: OBGYN CLINIC | Facility: CLINIC | Age: 26
End: 2021-02-16

## 2021-02-16 DIAGNOSIS — O09.892 HISTORY OF PREMATURE DELIVERY, CURRENTLY PREGNANT, SECOND TRIMESTER: ICD-10-CM

## 2021-02-16 PROCEDURE — 96372 THER/PROPH/DIAG INJ SC/IM: CPT

## 2021-02-16 RX ADMIN — HYDROXYPROGESTERONE CAPROATE 250 MG: 250 INJECTION INTRAMUSCULAR at 14:11

## 2021-02-16 NOTE — PROGRESS NOTES
[place]   Start at 16 weeks up until 36wks 6 days (end date):   o 1 mg/1ml weekly injection   Gestational age: 18 2wk   Side effects: None reported   Given by: SAM Brady Site: Shamir Alberto

## 2021-02-23 ENCOUNTER — ROUTINE PRENATAL (OUTPATIENT)
Dept: OBGYN CLINIC | Facility: CLINIC | Age: 26
End: 2021-02-23

## 2021-02-23 VITALS
WEIGHT: 153 LBS | HEART RATE: 99 BPM | SYSTOLIC BLOOD PRESSURE: 128 MMHG | BODY MASS INDEX: 24.59 KG/M2 | DIASTOLIC BLOOD PRESSURE: 76 MMHG | HEIGHT: 66 IN

## 2021-02-23 DIAGNOSIS — Z3A.22 22 WEEKS GESTATION OF PREGNANCY: Primary | ICD-10-CM

## 2021-02-23 PROCEDURE — 99213 OFFICE O/P EST LOW 20 MIN: CPT | Performed by: OBSTETRICS & GYNECOLOGY

## 2021-02-23 NOTE — PROGRESS NOTES
[place]   Start at 16 weeks up until 36wks 6 days (end date):   o 250 mg/1ml weekly injection   o 275 mg subcutaneous injection, weekly   Gestational age: 18w2d      Franco Horvath Side effects: no   Given by, site: Gloria Leija, [address]   Site: right deltoid

## 2021-02-23 NOTE — PROGRESS NOTES
OB/GYN  PN Visit  Sadia Velazquez  [id number]  2021  12:18 PM  Dr Jesus Chappell MD    S: 30 y o  [id number] 22w4d here for PN visit  HPI: 30 y o  [id number] at 22w4d here for PN visit  Feels well has no complaints today  Has been cutting down on smoking cigarettes  She has history of PPROM with fetal demise at 23 weeks  Recently underwent level 2 ultrasound showed normal cervical length  Has been advised to take aspirin daily due to increased risk for preeclampsia however patient has not been taking aspirin  Says will go later today to pick it up from the store  JANES: Estimated Date of Delivery: 21      OB cx:  History of PPROM with fetal demise at 23 weeks, increased risk of preeclampsia  OB complaints: none  Ctxns:   denies having uterine contractions  Leakage:   no LOF  Bleeding: no VB  Fetal movement: states she has felt good FM  O:  Vitals:    21 1152   BP: 128/76   Pulse: 99       Gen:  No acute distress, nonlabored breathing,   HENT: EOM nl  Cardiovascular: Regular, Rate and Rhythm, no murmur, gallop or rub   Respiratory: Clear to Auscultation Bilaterally, no wheezing, rhonchi or rales   Abdominal: Soft  NT/ND  Gravid  Neuro: AAOX3  Psychiatric: Normal mood and affect  Speech and behavior normal   OB exam completed: fundal height, FHTs, ultrasound if indicated      A/P:  #1  22w4d GESTATION  Encouraged  Aspirin daily as high risk for preeclampsia   Received Pat today   Encouraged compliance with treatments  Postpartum contraception:  Still undecided    Considering Nexplanon  Encouraged tobacco cessation  Labor precautions reviewed  Fetal kick counts reviewed  RTC in 4 weeks  Continue Prenatal Vitamins    Future Appointments   Date Time Provider Karolina Ramos   3/2/2021 10:00 AM Navdeep Claire [address] [facility] [address] [facility] BE [facility]   3/4/2021 11:00 AM   [address]   3/9/2021 10:00 AM OBGYN [address] [facility] Ke[place]an [facility] & NURSING [facility]   3/16/2021 10:00 AM Navdeep Claire 44 [address] [facility] & [facility]   3/23/2021 10:00 AM Annabelle Adames MD [facility] & [facility]   4/5/2021 11:00  Gray Phan MD  2/23/2021  12:18 PM

## 2021-03-02 ENCOUNTER — CLINICAL SUPPORT (OUTPATIENT)
Dept: OBGYN CLINIC | Facility: CLINIC | Age: 26
End: 2021-03-02

## 2021-03-02 ENCOUNTER — TELEPHONE (OUTPATIENT)
Dept: OBGYN CLINIC | Facility: CLINIC | Age: 26
End: 2021-03-02

## 2021-03-02 VITALS — HEIGHT: 66 IN | BODY MASS INDEX: 24.43 KG/M2 | WEIGHT: 152 LBS

## 2021-03-02 DIAGNOSIS — O09.219 PREVIOUS PRETERM DELIVERY, ANTEPARTUM: ICD-10-CM

## 2021-03-02 DIAGNOSIS — O09.892 HISTORY OF PREMATURE DELIVERY, CURRENTLY PREGNANT, SECOND TRIMESTER: ICD-10-CM

## 2021-03-02 PROCEDURE — 96372 THER/PROPH/DIAG INJ SC/IM: CPT

## 2021-03-02 RX ADMIN — HYDROXYPROGESTERONE CAPROATE 250 MG: 250 INJECTION INTRAMUSCULAR at 10:29

## 2021-03-02 NOTE — PROGRESS NOTES
[place]   Start at 16 weeks up until 36wks 6 days (end date):  o 1 mg/1ml weekly injection    Gestational age: 23 2wk     Side effects:None reported   Given by:SAM Dunn Site: Sangeetha Nielsen

## 2021-03-03 ENCOUNTER — TELEPHONE (OUTPATIENT)
Dept: PERINATAL CARE | Facility: CLINIC | Age: 26
End: 2021-03-03

## 2021-03-03 NOTE — TELEPHONE ENCOUNTER
-------------------------------------------------------------    Attempted to reach patient by phone and left voicemail to confirm appointment for MFM ultrasound  1 support person ( must be over the age of 15) may accompany you for your appointment  If you or your support person have traveled outside the state in the past 2 weeks, please call and notify our office today #[phone]  You and your support person must wear a mask ,covering nose and mouth,during your entire visit  To minimize your exposure in our waiting room, please call our office prior to entering the building  Check in and rooming questions will be done via phone  We will give you directions when to enter for your appointment  [place]: [phone]    IF you are not feeling well- cough, fever, shortness of breath or any flu like symptoms, contact your primary care physician or [phone] [place]  If you are awaiting COVID 19 test results please call and reschedule your appointment    Any questions with these instructions please call Maternal Fetal Medicine nurse line today @ # [phone]

## 2021-03-04 ENCOUNTER — ROUTINE PRENATAL (OUTPATIENT)
Dept: PERINATAL CARE | Facility: CLINIC | Age: 26
End: 2021-03-04
Payer: COMMERCIAL

## 2021-03-04 VITALS
HEART RATE: 87 BPM | BODY MASS INDEX: 25.3 KG/M2 | DIASTOLIC BLOOD PRESSURE: 58 MMHG | HEIGHT: 66 IN | WEIGHT: 157.4 LBS | SYSTOLIC BLOOD PRESSURE: 121 MMHG

## 2021-03-04 DIAGNOSIS — IMO0002 EVALUATE ANATOMY NOT SEEN ON PRIOR SONOGRAM: ICD-10-CM

## 2021-03-04 DIAGNOSIS — Z03.75 ENCOUNTER FOR SUSPECTED CERVICAL SHORTENING RULED OUT: ICD-10-CM

## 2021-03-04 DIAGNOSIS — O99.332 TOBACCO SMOKING AFFECTING PREGNANCY IN SECOND TRIMESTER: Primary | ICD-10-CM

## 2021-03-04 DIAGNOSIS — O09.219 PREVIOUS PRETERM DELIVERY, ANTEPARTUM: ICD-10-CM

## 2021-03-04 PROBLEM — Z30.09 ENCOUNTER FOR COUNSELING REGARDING CONTRACEPTION: Status: RESOLVED | Noted: 2020-12-28 | Resolved: 2021-03-04

## 2021-03-04 PROCEDURE — 76817 TRANSVAGINAL US OBSTETRIC: CPT | Performed by: OBSTETRICS & GYNECOLOGY

## 2021-03-04 PROCEDURE — 99213 OFFICE O/P EST LOW 20 MIN: CPT | Performed by: OBSTETRICS & GYNECOLOGY

## 2021-03-04 PROCEDURE — 76815 OB US LIMITED FETUS(S): CPT | Performed by: OBSTETRICS & GYNECOLOGY

## 2021-03-04 NOTE — PATIENT INSTRUCTIONS
Thank you for choosing us for your  care today  If you have any questions about your ultrasound or care, please do not hesitate to contact us or your primary obstetrician  Some general instructions for your pregnancy are:     Protect against coronavirus: Continue to practice social distancing, wear a mask, and wash your hands often  Pregnant women are increased risk of severe COVID  Notify your primary care doctor if you have any symptoms including cough, shortness of breath or difficulty breathing, fever, chills, muscle pain, sore throat, or loss of taste or smell  Pregnant women can receive the coronavirus vaccine   Exercise: Aim for 22 minutes per day (150 minutes per week) of regular exercise  Walking is great!  Nutrition: aim for calcium-rich and iron-rich foods as well as healthy sources of protein   Protect against the flu: get yourself and your entire household vaccinated against influenza  This will protect your baby   Learn about Preeclampsia: preeclampsia is a common, serious high blood pressure complication in pregnancy  A blood pressure of 031GZDR (systolic or top number) or 72JWGD (diastolic or bottom number) is not normal and needs evaluation by your doctor   If you smoke, try to reduce how many cigarettes you smoke or try to quit completely  Do not vape   Other warning signs to watch out for in pregnancy or postpartum: chest pain, obstructed breathing or shortness of breath, seizures, thoughts of hurting yourself or your baby, bleeding, a painful or swollen leg, fever, or headache (see [facility] POST-BIRTH Warning Signs campaign)  If these happen call 911  Itching is also not normal in pregnancy and if you experience this, especially over your hands and feet, potentially worse at night, notify your doctors     Lastly, if you are contacted regarding participation in a survey about your experience in our office, please know that we take any feedback you provide seriously and use it to improve how we deliver care through our center

## 2021-03-04 NOTE — PROGRESS NOTES
Ultrasound Probe Disinfection    A transvaginal ultrasound was performed  Prior to use, disinfection was performed with High Level Disinfection Process (Trophon)  Probe serial number B1: [id number] was used        Elisabeth Downs  03/04/21  10:58 AM

## 2021-03-05 NOTE — PROGRESS NOTES
[address] [address]: Ms Howard Pruett was seen for serial cervical length screening ultrasound  See ultrasound report under "OB Procedures" tab  Please don't hesitate to contact our office with any concerns or questions    Ranulfo Ramos MD

## 2021-03-09 ENCOUNTER — CLINICAL SUPPORT (OUTPATIENT)
Dept: OBGYN CLINIC | Facility: CLINIC | Age: 26
End: 2021-03-09

## 2021-03-09 ENCOUNTER — TELEPHONE (OUTPATIENT)
Dept: OBGYN CLINIC | Facility: CLINIC | Age: 26
End: 2021-03-09

## 2021-03-09 DIAGNOSIS — O09.892 HISTORY OF PREMATURE DELIVERY, CURRENTLY PREGNANT, SECOND TRIMESTER: ICD-10-CM

## 2021-03-09 PROCEDURE — 96372 THER/PROPH/DIAG INJ SC/IM: CPT

## 2021-03-09 RX ADMIN — HYDROXYPROGESTERONE CAPROATE 250 MG: 250 INJECTION INTRAMUSCULAR at 12:20

## 2021-03-09 NOTE — PROGRESS NOTES
Pat   Start at 16 weeks up until 36wks 6 days (end date):   o 1 mg/1ml weekly injection    Gestational age: 23 weeks  4 days     Side effects: no   Given by, site: Mckenzie Ha RN   Site: Left buttock IM

## 2021-03-16 ENCOUNTER — ROUTINE PRENATAL (OUTPATIENT)
Dept: OBGYN CLINIC | Facility: CLINIC | Age: 26
End: 2021-03-16

## 2021-03-16 DIAGNOSIS — O09.892 HISTORY OF PREMATURE DELIVERY, CURRENTLY PREGNANT, SECOND TRIMESTER: ICD-10-CM

## 2021-03-16 PROCEDURE — 96372 THER/PROPH/DIAG INJ SC/IM: CPT

## 2021-03-16 RX ADMIN — HYDROXYPROGESTERONE CAPROATE 250 MG: 250 INJECTION INTRAMUSCULAR at 10:00

## 2021-03-16 NOTE — PROGRESS NOTES
[place]   Start at 16 weeks up until 36wks 6 days (end date):   o 1 mg/1ml weekly injection      Gestational age: 22 weeks  4 days     Side effects: no   Given by, site: Mamie Tellez RN   Site: Right buttock IM

## 2021-03-23 ENCOUNTER — ROUTINE PRENATAL (OUTPATIENT)
Dept: OBGYN CLINIC | Facility: CLINIC | Age: 26
End: 2021-03-23

## 2021-03-23 ENCOUNTER — APPOINTMENT (OUTPATIENT)
Dept: LAB | Facility: HOSPITAL | Age: 26
End: 2021-03-23
Payer: COMMERCIAL

## 2021-03-23 VITALS
BODY MASS INDEX: 24.91 KG/M2 | WEIGHT: 155 LBS | SYSTOLIC BLOOD PRESSURE: 129 MMHG | HEART RATE: 88 BPM | DIASTOLIC BLOOD PRESSURE: 66 MMHG | HEIGHT: 66 IN

## 2021-03-23 DIAGNOSIS — Z3A.26 26 WEEKS GESTATION OF PREGNANCY: Primary | ICD-10-CM

## 2021-03-23 DIAGNOSIS — Z3A.26 26 WEEKS GESTATION OF PREGNANCY: ICD-10-CM

## 2021-03-23 DIAGNOSIS — O09.892 HISTORY OF PREMATURE DELIVERY, CURRENTLY PREGNANT, SECOND TRIMESTER: ICD-10-CM

## 2021-03-23 LAB
BASOPHILS # BLD AUTO: 0.02 THOUSANDS/ΜL (ref 0–0.1)
BASOPHILS NFR BLD AUTO: 0 % (ref 0–1)
EOSINOPHIL # BLD AUTO: 0.17 THOUSAND/ΜL (ref 0–0.61)
EOSINOPHIL NFR BLD AUTO: 3 % (ref 0–6)
ERYTHROCYTE [DISTWIDTH] IN BLOOD BY AUTOMATED COUNT: 13.9 % (ref 11.6–15.1)
GLUCOSE 1H P 50 G GLC PO SERPL-MCNC: 86 MG/DL
HCT VFR BLD AUTO: 37.4 % (ref 34.8–46.1)
HGB BLD-MCNC: 11.9 G/DL (ref 11.5–15.4)
IMM GRANULOCYTES # BLD AUTO: 0.06 THOUSAND/UL (ref 0–0.2)
IMM GRANULOCYTES NFR BLD AUTO: 1 % (ref 0–2)
LYMPHOCYTES # BLD AUTO: 1.9 THOUSANDS/ΜL (ref 0.6–4.47)
LYMPHOCYTES NFR BLD AUTO: 29 % (ref 14–44)
MCH RBC QN AUTO: 30.8 PG (ref 26.8–34.3)
MCHC RBC AUTO-ENTMCNC: 31.8 G/DL (ref 31.4–37.4)
MCV RBC AUTO: 97 FL (ref 82–98)
MONOCYTES # BLD AUTO: 0.39 THOUSAND/ΜL (ref 0.17–1.22)
MONOCYTES NFR BLD AUTO: 6 % (ref 4–12)
NEUTROPHILS # BLD AUTO: 4 THOUSANDS/ΜL (ref 1.85–7.62)
NEUTS SEG NFR BLD AUTO: 61 % (ref 43–75)
NRBC BLD AUTO-RTO: 0 /100 WBCS
PLATELET # BLD AUTO: 171 THOUSANDS/UL (ref 149–390)
PMV BLD AUTO: 9.5 FL (ref 8.9–12.7)
RBC # BLD AUTO: 3.86 MILLION/UL (ref 3.81–5.12)
RPR SER QL: NORMAL
SL AMB  POCT GLUCOSE, UA: NORMAL
SL AMB LEUKOCYTE ESTERASE,UA: NORMAL
SL AMB POCT BLOOD,UA: NORMAL
SL AMB POCT KETONES,UA: NORMAL
SL AMB POCT URINE PROTEIN: NORMAL
WBC # BLD AUTO: 6.54 THOUSAND/UL (ref 4.31–10.16)

## 2021-03-23 PROCEDURE — 86592 SYPHILIS TEST NON-TREP QUAL: CPT

## 2021-03-23 PROCEDURE — 85025 COMPLETE CBC W/AUTO DIFF WBC: CPT

## 2021-03-23 PROCEDURE — 81002 URINALYSIS NONAUTO W/O SCOPE: CPT | Performed by: OBSTETRICS & GYNECOLOGY

## 2021-03-23 PROCEDURE — 99213 OFFICE O/P EST LOW 20 MIN: CPT | Performed by: OBSTETRICS & GYNECOLOGY

## 2021-03-23 PROCEDURE — 36415 COLL VENOUS BLD VENIPUNCTURE: CPT

## 2021-03-23 PROCEDURE — 82950 GLUCOSE TEST: CPT

## 2021-03-23 PROCEDURE — 96372 THER/PROPH/DIAG INJ SC/IM: CPT

## 2021-03-23 RX ADMIN — HYDROXYPROGESTERONE CAPROATE 250 MG: 250 INJECTION INTRAMUSCULAR at 10:25

## 2021-03-23 NOTE — PROGRESS NOTES
[place]   Start at 16 weeks up until 36wks 6 days (end date):  o 1 mg/1ml weekly injection    Gestational age: 28 3wks    Ben Mays Side effects:None Reported   Given by : SAM Muñoz Site: Bharati Ahn

## 2021-03-24 NOTE — PROGRESS NOTES
OB/GYN prenatal visit    S: 30 y o  [id number] 26w4d here for PN visit  She has no obstetric complaints, including pelvic pain, contractions, vaginal bleeding, loss of fluid, or decreased fetal movement  Patient has been feeling emotional due to multiple family member passing and no support from her mother; however, patient has lots of support from her partner and children  She denies SI/HI  O:  Vitals:    21 1000   BP: 129/66   Pulse: 88       Gen: no acute distress, nonlabored breathing  Fundal Height (cm): 26 cm  Fetal Heart Rate: 152    A/P:      IUP at 26w4d  No obstetric complaints today  [facility] 3/4/2021 : cervical length wnl  TWG: + 20 pounds (recommended weight gain 25-35 pounds)  28 week lab slip given  Flu vaccine need to complete, TDAP vaccine due at 28 weeks  Contraception: nexplanon  Breastfeeding: yes  Birth plan: plans on epidural  Discussed  labor precautions and fetal kick counts    Return to clinic in 2 weeks    History of  delivery, History of PPROM/delivery at 23 weeks  Cervical length monitoring  Currently on lynn  Recommend aspirin prophylaxis 162mg    Tobacco use  Currently smoking 2-3 cigs/on some days not every day; improvement from previously every day  Discussed possible nicotine patch    Insomnia  Rx for unisom sent    Depressed mood  Recommend patient meeting with ; however, patient declines at this time   We also briefly discussed starting on zoloft, but patient declines at this time      COVID 19 precautions were discussed with patient at length, reviewed symptoms, hygiene, social distancing, patient to call office  with questions/concerns    Jimmy Ibarra MD  3/24/2021  9:05 AM

## 2021-03-25 ENCOUNTER — TELEPHONE (OUTPATIENT)
Dept: OBGYN CLINIC | Facility: CLINIC | Age: 26
End: 2021-03-25

## 2021-03-30 ENCOUNTER — CLINICAL SUPPORT (OUTPATIENT)
Dept: OBGYN CLINIC | Facility: CLINIC | Age: 26
End: 2021-03-30

## 2021-03-30 DIAGNOSIS — O09.899 HISTORY OF PRETERM DELIVERY, CURRENTLY PREGNANT: Primary | ICD-10-CM

## 2021-03-30 PROCEDURE — 96372 THER/PROPH/DIAG INJ SC/IM: CPT

## 2021-03-30 RX ADMIN — HYDROXYPROGESTERONE CAPROATE 250 MG: 250 INJECTION INTRAMUSCULAR at 09:55

## 2021-03-30 NOTE — PROGRESS NOTES
Pat   Start at 16 weeks up until 36wks 6 days (end date):   o 250 mg/1ml weekly injection   o 275 mg subcutaneous injection, weekly   Gestational age: 32 weeks  4 days     Side effects: no   Given by, site: Rolanda Fowler   Site: right deltoid

## 2021-04-05 ENCOUNTER — ULTRASOUND (OUTPATIENT)
Dept: PERINATAL CARE | Facility: CLINIC | Age: 26
End: 2021-04-05
Payer: COMMERCIAL

## 2021-04-05 VITALS
WEIGHT: 157.8 LBS | SYSTOLIC BLOOD PRESSURE: 121 MMHG | HEIGHT: 66 IN | BODY MASS INDEX: 25.36 KG/M2 | HEART RATE: 86 BPM | DIASTOLIC BLOOD PRESSURE: 56 MMHG

## 2021-04-05 DIAGNOSIS — O99.332 TOBACCO SMOKING AFFECTING PREGNANCY IN SECOND TRIMESTER: ICD-10-CM

## 2021-04-05 DIAGNOSIS — O09.892 HISTORY OF PREMATURE DELIVERY, CURRENTLY PREGNANT, SECOND TRIMESTER: ICD-10-CM

## 2021-04-05 DIAGNOSIS — Z3A.28 28 WEEKS GESTATION OF PREGNANCY: ICD-10-CM

## 2021-04-05 PROCEDURE — 76816 OB US FOLLOW-UP PER FETUS: CPT | Performed by: OBSTETRICS & GYNECOLOGY

## 2021-04-05 PROCEDURE — 99213 OFFICE O/P EST LOW 20 MIN: CPT | Performed by: OBSTETRICS & GYNECOLOGY

## 2021-04-05 NOTE — PROGRESS NOTES
The patient was seen today for an ultrasound  Please see ultrasound report (located under Ob Procedures) for additional details  Thank you very much for allowing us to participate in the care of this very nice patient  Should you have any questions, please do not hesitate to contact me  Osmany Barber MD [address] [facility]  Attending Physician, Shreya

## 2021-04-06 ENCOUNTER — PATIENT OUTREACH (OUTPATIENT)
Dept: OBGYN CLINIC | Facility: CLINIC | Age: 26
End: 2021-04-06

## 2021-04-06 ENCOUNTER — ROUTINE PRENATAL (OUTPATIENT)
Dept: OBGYN CLINIC | Facility: CLINIC | Age: 26
End: 2021-04-06

## 2021-04-06 VITALS
HEIGHT: 66 IN | BODY MASS INDEX: 25.23 KG/M2 | WEIGHT: 157 LBS | DIASTOLIC BLOOD PRESSURE: 70 MMHG | HEART RATE: 99 BPM | SYSTOLIC BLOOD PRESSURE: 118 MMHG

## 2021-04-06 DIAGNOSIS — Z3A.28 28 WEEKS GESTATION OF PREGNANCY: ICD-10-CM

## 2021-04-06 DIAGNOSIS — Z23 NEED FOR VACCINATION: Primary | ICD-10-CM

## 2021-04-06 LAB
SL AMB  POCT GLUCOSE, UA: NORMAL
SL AMB POCT URINE PROTEIN: NORMAL

## 2021-04-06 PROCEDURE — 96372 THER/PROPH/DIAG INJ SC/IM: CPT | Performed by: OBSTETRICS & GYNECOLOGY

## 2021-04-06 PROCEDURE — 99213 OFFICE O/P EST LOW 20 MIN: CPT | Performed by: OBSTETRICS & GYNECOLOGY

## 2021-04-06 PROCEDURE — 81002 URINALYSIS NONAUTO W/O SCOPE: CPT | Performed by: OBSTETRICS & GYNECOLOGY

## 2021-04-06 RX ADMIN — HYDROXYPROGESTERONE CAPROATE 250 MG: 250 INJECTION INTRAMUSCULAR at 11:00

## 2021-04-06 NOTE — ASSESSMENT & PLAN NOTE
Tearful during encounter reporting stressors at home and stressors that her  and family places on her  Ambulatory social work referral provided  Voices she is safe at home  Denies thoughts of self harm or harm to others

## 2021-04-06 NOTE — PROGRESS NOTES
Anxiety and depression  Tearful during encounter reporting stressors at home and stressors that her  and family places on her  Ambulatory social work referral provided  Voices she is safe at home  Denies thoughts of self harm or harm to others  28 weeks gestation of pregnancy  No obstetric complaints today  Growth scan yesterday Vertex, EFW 48%, next growth scan at 34 weeks   TWG: + 21 pounds (recommended weight gain 25-35 pounds)  28 week labs reviewed, WNL  Tdap given today  Discussed COVID vaccine  Contraception: nexplanon  Breastfeeding: yes  Birth plan: plans on epidural  Discussed  labor precautions and fetal kick counts      Carrier of beta thalassemia  Recommendation by M for FOB to get tested  Please check on status of this at next visit  Previous  delivery, antepartum  Ongoing weekly lynn injections  Completed cervical length monitoring     RTC 2 weeks    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

## 2021-04-06 NOTE — PATIENT INSTRUCTIONS
Coronavirus (EQGBF-33) pregnancy FAQs: Medical experts answer your questions  From [employer] cy  [id number]  bc (courtesy of [facility])  As of 3/18/20  By [place] [address]  Medically reviewed by Society for Maternal-Fetal Medicine       We asked parents-to-be to send us their most pressing questions about coronavirus (COVID-19)  Among them: Is it still safe to deliver in a hospital? What if my ob-gyn has the virus? We sent those questions to the top docs at the Society for Maternal-Fetal Medicine  Here are their answers  The coronavirus (COVID-19) pandemic has been declared a national emergency in the [employer] [place] by [employer]  Moms-to-be like you are concerned about everything from getting medicines to managing disruptions at work  But above and beyond any worry about lifestyle changes is a focus on your health and the impact of COVID-19 on your pregnancy  We asked obstetrics doctors who handle the most complicated pregnancy issues to answer your questions about the coronavirus  Here are their responses, provided by Dr Oziel Martinez and her colleagues at the Society for [place] 250  Am I at more risk for COVID-19 if I'm pregnant? We don't know  Pregnancy does change your immune system in ways that might make you more susceptible to viral respiratory infections like COVID-19  If you become infected, you might also be at higher risk for more severe illness compared to the general population  Although this does not appear to be the case with COVID-19, based on limited data so far, a higher risk has been true for pregnant women with other coronavirus infections (SARS-CoV and MERS-CoV) and other viral respiratory infections, such as flu  It's important to take preventive actions to avoid infection, such as washing your hands often and avoiding people who are sick      How might coronavirus affect my pregnancy? Again, we don't know  Women with other coronavirus infections (such as SARS-CoV) did not have miscarriage or stillbirth at higher rates than the general population  We know that having other respiratory viral infections during pregnancy, such as flu, has been associated with problems like low birth weight and  birth  Also, having a high fever early in pregnancy may increase the risk of certain birth defects  Could I transmit coronavirus to my baby during pregnancy or delivery? We don't know whether you could transmit COVID-19 to your baby before or during delivery  However, among the few case studies of infants born to mothers with COVID-23 published in peer-reviewed literature, none of the infants tested positive for the virus  Also, there was no virus detected in samples of amniotic fluid or breast milk  There have been a few reports of newborns as young as a few days old with infection, suggesting that a mother can transmit the infection to her infant through close contact after delivery  There have been no reports of mother-to-baby transmission for other coronaviruses (MERS-CoV and SARS-CoV), although only limited information is available  Is it safe for me to deliver at a hospital where there have been COVID-19 cases? Yes  We know that COVID-19 is a very scary virus  The good news is that hospitals are taking great precautions to keep patients and healthcare providers safe  When a patient is even suspected to have COVID-19, they are placed in a negative pressure room  (Think of these rooms as vacuums that suck and filter the air so it's safe for the other people in the hospital)  This makes it possible for you to deliver at the hospital without putting you or your baby at risk  Hospitals are also implementing stricter visiting policies to keep patients safe  It's worth calling your hospital to check if there are any new regulations to be aware of      What plans should I make now in case the hospital system is overwhelmed when it's time for me to deliver? This is a great question to talk with your doctor or midwife about when you're 34 to 36 weeks pregnant  Every hospital is making different plans for dealing with this scenario  I work in healthcare  Should I ask my doctor to excuse me from work until the baby is born? What if I work in a school, the travel [employer], or some other high-risk setting? Healthcare facilities should take care to limit the exposure of pregnant employees to patients with confirmed or suspected COVID-19, just as they would with other infectious cases  If you continue working, be sure to follow the CDC's risk assessment and infection control guidelines  If you work in a school, travel [employer], or other high-risk setting, talk with your employer about what it's doing to protect employees and minimize infection risks  Wash your hands often  What if my OB gets COVID-19? If your doctor or midwife tests positive for COVID-19, they will need to be quarantined until they recover and are no longer at risk of transmitting the virus  In this case, you'll be assigned to another OB in your doctor's practice (or you may choose another practitioner yourself)  Ask your new OB or your doctor's office if you should self-quarantine or be tested for the virus  (It will depend on when you last saw your provider and when that person tested positive )    Should we hold off on trying to conceive because of COVID-19? At this time, there's no reason to hold off on trying to get pregnant, but the data we have is really limited  For example, we don't think the virus causes birth defects or increases your risk of miscarriage  But we don't know whether you could transmit COVID-19 to your baby before or during delivery  We also don't know if the virus lives in semen or can be sexually transmitted      We have a babymoon scheduled in the next few months - should we cancel? If you're planning to travel internationally or on a cruise, you should strongly consider canceling  At this time, the virus has reached more than 140 countries, and there are travel bans to [place], most of [place], and [place]  Places where large numbers of people gather are at highest risk, especially airports and cruise ships  If you're planning travel in the [place] , note that any travel setting increases your risk of exposure, and there may be travel bans even in [place] by the time you're scheduled to go  Even if you're state is not blocked, you'll definitely want to avoid traveling to communities where the virus is spreading  To find out where these places are, check The [employer] Times map based on CDC data  For the most current advice to help you avoid exposure, check the CDC's COVID-19 travel page  Will the hospital separate me from my  and keep the baby in quarantine? If you test positive for COVID-19 or have been exposed but have no symptoms, the CDC, [employer] of Obstetricians and Gynecologists, and the Society for [place] 250 all recommend that you be  from your baby to decrease the risk of transmission to the baby  This would last until you are no longer at risk of transmitting the virus  If you do not have COVID-19 and have not been exposed to the virus, the hospital will not separate you from your   My hospital is restricting visitors and only allowing one support person  If my support person leaves after the delivery, will they be allowed to come back? Every hospital has different policies  Contact your hospital or labor and delivery unit a week or so before delivery to get the most up-to-date restrictions  In general, if your support person needs to leave, they would be allowed back unless they knew they were exposed to COVID-19 after leaving your company    [facility] understands that the coronavirus (COVID-19) pandemic is an evolving story and that your questions will change over time  We'll continue asking moms and dads in our Community what they want to know, and we'll get the answers from experts to keep them - and you - informed and supported  My mom was planning to fly here to help me care for my new baby after delivery  Should I tell her not to come? Yes  If your mom is over 61 or has any serious chronic medical conditions (such as heart disease, lung disease, or diabetes), she is at higher risk of serious illness from COVID-19 and should avoid air travel  And remember that any travel setting increases a person's risk of exposure  So, it may be risky to have her around the baby after she has been traveling  For the most current advice on traveling, check the [facility]'s COVID-19 travel page  [facility]Center understands that the coronavirus pandemic is an evolving story and that your questions will change over time  We'll continue asking moms and dads in our Community what they want to know, and we'll get the answers from experts to keep them - and you - informed and supported  Pregnancy at 32 to 30 Weeks   AMBULATORY CARE:   What changes are happening to your body: You may notice new symptoms such as shortness of breath, heartburn, or swelling of your ankles and feet  You may also have trouble sleeping or contractions  Seek care immediately if:   · You develop a severe headache that does not go away  · You have new or increased vision changes, such as blurred or spotted vision  · You have new or increased swelling in your face or hands  · You have vaginal spotting or bleeding  · Your water broke or you feel warm water gushing or trickling from your vagina  Contact your healthcare provider if:   · You have more than 5 contractions in 1 hour  · You notice any changes in your baby's movements  · You have abdominal cramps, pressure, or tightening  · You have a change in vaginal discharge      · You have chills or a fever     · You have vaginal itching, burning, or pain  · You have yellow, green, white, or foul-smelling vaginal discharge  · You have pain or burning when you urinate, less urine than usual, or pink or bloody urine  · You have questions or concerns about your condition or care  How to care for yourself at this stage of your pregnancy:   · Eat a variety of healthy foods  Healthy foods include fruits, vegetables, whole-grain breads, low-fat dairy foods, beans, lean meats, and fish  Drink liquids as directed  Ask how much liquid to drink each day and which liquids are best for you  Limit caffeine to less than 200 milligrams each day  Limit your intake of fish to 2 servings each week  Choose fish low in mercury such as canned light tuna, shrimp, salmon, cod, or tilapia  Do not  eat fish high in mercury such as swordfish, tilefish, fernando mackerel, and shark  · Manage heartburn  by eating 4 or 5 small meals each day instead of large meals  Avoid spicy food  · Manage swelling  by lying down and putting your feet up  · Take prenatal vitamins as directed  Your need for certain vitamins and minerals, such as folic acid, increases during pregnancy  Prenatal vitamins provide some of the extra vitamins and minerals you need  Prenatal vitamins may also help to decrease the risk of certain birth defects  · Talk to your healthcare provider about exercise  Moderate exercise can help you stay fit  Your healthcare provider will help you plan an exercise program that is safe for you during pregnancy  · Do not smoke  Smoking increases your risk of a miscarriage and other health problems during your pregnancy  Smoking can cause your baby to be born too early or weigh less at birth  Ask your healthcare provider for information if you need help quitting  · Do not drink alcohol  Alcohol passes from your body to your baby through the placenta   It can affect your baby's brain development and cause fetal alcohol syndrome (FAS)  FAS is a group of conditions that causes mental, behavior, and growth problems  · Talk to your healthcare provider before you take any medicines  Many medicines may harm your baby if you take them when you are pregnant  Do not take any medicines, vitamins, herbs, or supplements without first talking to your healthcare provider  Never use illegal or street drugs (such as marijuana or cocaine) while you are pregnant  Safety tips during pregnancy:   · Avoid hot tubs and saunas  Do not use a hot tub or sauna while you are pregnant, especially during your first trimester  Hot tubs and saunas may raise your baby's temperature and increase the risk of birth defects  · Avoid toxoplasmosis  This is an infection caused by eating raw meat or being around infected cat feces  It can cause birth defects, miscarriages, and other problems  Wash your hands after you touch raw meat  Make sure any meat is well-cooked before you eat it  Avoid raw eggs and unpasteurized milk  Use gloves or ask someone else to clean your cat's litter box while you are pregnant  Changes that are happening with your baby:  By 30 weeks, your baby may weigh more than 3 pounds  Your baby may be about 11 inches long from the top of the head to the rump (baby's bottom)  Your baby's eyes open and close now  Your baby's kicks and movements are more forceful at this time  What you need to know about prenatal care: Your healthcare provider will check your blood pressure and weight  You may also need the following:  · Blood tests  may be done to check for anemia or blood type  · A urine test  may also be done to check for sugar and protein  These can be signs of gestational diabetes or infection  Protein in your urine may also be a sign of preeclampsia  Preeclampsia is a condition that can develop during week 20 or later of your pregnancy   It causes high blood pressure, and it can cause problems with your kidneys and other organs  · A Tdap vaccine and flu vaccine  may be recommended by your healthcare provider  · A gestational diabetes screen  will be done using an oral glucose tolerance test (OGTT)  An OGTT starts with a blood sugar level check after you have not eaten for 8 hours  You are then given a glucose drink  Your blood sugar level is checked after 1 hour, 2 hours, and sometimes 3 hours  Healthcare providers look at how much your blood sugar level increases from the first check  · Fundal height  is a measurement of your uterus to check your baby's growth  This number is usually the same as the number of weeks that you have been pregnant  Your healthcare provider may also check your baby's position  · Your baby's heart rate  will be checked  © Copyright [address] Information is for End User's use only and may not be sold, redistributed or otherwise used for commercial purposes  All illustrations and images included in CareNotes® are the copyrighted property of [employer]  or [address] [place], [place] [address]   The above information is an  only  It is not intended as medical advice for individual conditions or treatments  Talk to your doctor, nurse or pharmacist before following any medical regimen to see if it is safe and effective for you

## 2021-04-06 NOTE — PROGRESS NOTES
NAT TELLO briefly met with Pt for assessment  Pt was in a hurry and requested NAT TELLO phone to call her tomorrow  NAT TELLO spoke with 29 y/o-M- G7:P2:AB4- English speaking woman for pre zee assessment  Pt resides with spouse and 2 kids  Pt reported she is doing well now  Pt reported there has been many stressors at home but things are better  Pt verbalized is not interested on SW CM assessment at this time  States she is good  NAT TELLO discussed f/u in 6 weeks and Pt agreed  Pt was encouraged to call at any time needed

## 2021-04-06 NOTE — ASSESSMENT & PLAN NOTE
No obstetric complaints today  Growth scan yesterday Vertex, EFW 48%, next growth scan at 34 weeks   TWG: + 21 pounds (recommended weight gain 25-35 pounds)  28 week labs reviewed, WNL  Tdap given today  Discussed COVID vaccine  Contraception: nexplanon  Breastfeeding: yes  Birth plan: plans on epidural  Discussed  labor precautions and fetal kick counts

## 2021-04-06 NOTE — PROGRESS NOTES
[place]   Start at 16 weeks up until 36wks 6 days (end date):   o 250 mg/1ml weekly injection   o 275 mg subcutaneous injection, weekly   Gestational age: 29 weeks  4 days     Side effects: no   Given by, site: Rolanda Fong   Site: left deltoid

## 2021-04-13 ENCOUNTER — CLINICAL SUPPORT (OUTPATIENT)
Dept: OBGYN CLINIC | Facility: CLINIC | Age: 26
End: 2021-04-13

## 2021-04-13 DIAGNOSIS — O09.899 HISTORY OF PRETERM DELIVERY, CURRENTLY PREGNANT: Primary | ICD-10-CM

## 2021-04-13 DIAGNOSIS — Z23 ENCOUNTER FOR IMMUNIZATION: ICD-10-CM

## 2021-04-13 PROCEDURE — 96372 THER/PROPH/DIAG INJ SC/IM: CPT

## 2021-04-13 NOTE — PROGRESS NOTES
[place]   Start at 16 weeks up until 36wks 6 days (end date):   o 250 mg/1ml weekly injection   o 275 mg subcutaneous injection, weekly   Gestational age: 32 weeks  4 days     Side effects: no   Given by, site: Rolanda Martines   Site: right deltoid

## 2021-04-20 ENCOUNTER — ROUTINE PRENATAL (OUTPATIENT)
Dept: OBGYN CLINIC | Facility: CLINIC | Age: 26
End: 2021-04-20

## 2021-04-20 VITALS
SYSTOLIC BLOOD PRESSURE: 124 MMHG | WEIGHT: 156 LBS | DIASTOLIC BLOOD PRESSURE: 66 MMHG | HEIGHT: 66 IN | HEART RATE: 89 BPM | BODY MASS INDEX: 25.07 KG/M2

## 2021-04-20 DIAGNOSIS — O99.333 TOBACCO SMOKING AFFECTING PREGNANCY IN THIRD TRIMESTER: ICD-10-CM

## 2021-04-20 DIAGNOSIS — D56.3 CARRIER OF BETA THALASSEMIA: ICD-10-CM

## 2021-04-20 DIAGNOSIS — O09.219 PREVIOUS PRETERM DELIVERY, ANTEPARTUM: Primary | ICD-10-CM

## 2021-04-20 DIAGNOSIS — Z3A.30 30 WEEKS GESTATION OF PREGNANCY: ICD-10-CM

## 2021-04-20 LAB
SL AMB  POCT GLUCOSE, UA: NORMAL
SL AMB POCT URINE PROTEIN: NORMAL

## 2021-04-20 PROCEDURE — 96372 THER/PROPH/DIAG INJ SC/IM: CPT | Performed by: NURSE PRACTITIONER

## 2021-04-20 PROCEDURE — 99213 OFFICE O/P EST LOW 20 MIN: CPT | Performed by: NURSE PRACTITIONER

## 2021-04-20 PROCEDURE — 81002 URINALYSIS NONAUTO W/O SCOPE: CPT | Performed by: NURSE PRACTITIONER

## 2021-04-20 RX ADMIN — HYDROXYPROGESTERONE CAPROATE 250 MG: 250 INJECTION INTRAMUSCULAR at 10:22

## 2021-04-20 NOTE — PROGRESS NOTES
[place]   Start at 16 weeks up until 36wks 6 days (end date):   o 250 mg/1ml weekly injection   o 275 mg subcutaneous injection, weekly   Gestational age: 30 weeks  4 days     Side effects: no   Given by, site: Rolanda Jain   Site: left deltoid

## 2021-04-20 NOTE — PATIENT INSTRUCTIONS
Pregnancy at 31 to 34 Weeks   AMBULATORY CARE:   What changes are happening to your body: You may continue to have symptoms such as shortness of breath, heartburn, contractions, or swelling of your ankles and feet  You may be gaining about 1 pound a week now  Seek care immediately if:   · You develop a severe headache that does not go away  · You have new or increased vision changes, such as blurred or spotted vision  · You have new or increased swelling in your face or hands  · You have vaginal spotting or bleeding  · Your water broke or you feel warm water gushing or trickling from your vagina  Contact your healthcare provider if:   · You have more than 5 contractions in 1 hour  · You notice any changes in your baby's movements  · You have abdominal cramps, pressure, or tightening  · You have a change in vaginal discharge  · You have chills or a fever  · You have vaginal itching, burning, or pain  · You have yellow, green, white, or foul-smelling vaginal discharge  · You have pain or burning when you urinate, less urine than usual, or pink or bloody urine  · You have questions or concerns about your condition or care  How to care for yourself at this stage of your pregnancy:   · Eat a variety of healthy foods  Healthy foods include fruits, vegetables, whole-grain breads, low-fat dairy foods, beans, lean meats, and fish  Drink liquids as directed  Ask how much liquid to drink each day and which liquids are best for you  Limit caffeine to less than 200 milligrams each day  Limit your intake of fish to 2 servings each week  Choose fish low in mercury such as canned light tuna, shrimp, salmon, cod, or tilapia  Do not  eat fish high in mercury such as swordfish, tilefish, fernando mackerel, and shark  · Manage heartburn  by eating 4 or 5 small meals each day instead of large meals  Avoid spicy food  · Manage swelling  by lying down and putting your feet up  · Take prenatal vitamins as directed  Your need for certain vitamins and minerals, such as folic acid, increases during pregnancy  Prenatal vitamins provide some of the extra vitamins and minerals you need  Prenatal vitamins may also help to decrease the risk of certain birth defects  · Talk to your healthcare provider about exercise  Moderate exercise can help you stay fit  Your healthcare provider will help you plan an exercise program that is safe for you during pregnancy  · Do not smoke  Smoking increases your risk of a miscarriage and other health problems during your pregnancy  Smoking can cause your baby to be born too early or weigh less at birth  Ask your healthcare provider for information if you need help quitting  · Do not drink alcohol  Alcohol passes from your body to your baby through the placenta  It can affect your baby's brain development and cause fetal alcohol syndrome (FAS)  FAS is a group of conditions that causes mental, behavior, and growth problems  · Talk to your healthcare provider before you take any medicines  Many medicines may harm your baby if you take them when you are pregnant  Do not take any medicines, vitamins, herbs, or supplements without first talking to your healthcare provider  Never use illegal or street drugs (such as marijuana or cocaine) while you are pregnant  Safety tips during pregnancy:   · Avoid hot tubs and saunas  Do not use a hot tub or sauna while you are pregnant, especially during your first trimester  Hot tubs and saunas may raise your baby's temperature and increase the risk of birth defects  · Avoid toxoplasmosis  This is an infection caused by eating raw meat or being around infected cat feces  It can cause birth defects, miscarriages, and other problems  Wash your hands after you touch raw meat  Make sure any meat is well-cooked before you eat it  Avoid raw eggs and unpasteurized milk   Use gloves or ask someone else to clean your cat's litter box while you are pregnant  Changes that are happening with your baby:  By 34 weeks, your baby may weigh more than 5 pounds  Your baby will be about 12 ½ inches long from the top of the head to the rump (baby's bottom)  Your baby is gaining about ½ pound a week  Your baby's eyes open and close now  Your baby's kicks and movements are more forceful at this time  What you need to know about prenatal care: Your healthcare provider will check your blood pressure and weight  You may also need the following:  · A urine test  may also be done to check for sugar and protein  These can be signs of gestational diabetes or infection  Protein in your urine may also be a sign of preeclampsia  Preeclampsia is a condition that can develop during week 20 or later of your pregnancy  It causes high blood pressure, and it can cause problems with your kidneys and other organs  · A Tdap vaccine  may be recommended by your healthcare provider  · Fundal height  is a measurement of your uterus to check your baby's growth  This number is usually the same as the number of weeks that you have been pregnant  Your healthcare provider may also check your baby's position  · Your baby's heart rate  will be checked  © Copyright [address] [place] [address] Drive Information is for End User's use only and may not be sold, redistributed or otherwise used for commercial purposes  All illustrations and images included in CareNotes® are the copyrighted property of A D A M , Inc  or [facility] Ubaldo Prado   The above information is an  only  It is not intended as medical advice for individual conditions or treatments  Talk to your doctor, nurse or pharmacist before following any medical regimen to see if it is safe and effective for you  Fetal Movement   WHAT YOU NEED TO KNOW:   Fetal movements are the kicks, rolls, and hiccups of your unborn baby   You may start to feel these movements when you are 20 weeks pregnant  The movements grow stronger and more frequent as your baby grows  Fetal movements show that your unborn baby is getting the oxygen and nutrients he needs before birth  Fewer fetal movements may signal a problem with your baby's health  DISCHARGE INSTRUCTIONS:   Follow up with your healthcare provider or obstetrician as directed:  Write down your questions so you remember to ask them during your visits  Normal fetal movement:  Fetal activity can be described by 4 states, from least to most active  During quiet sleep, your unborn baby may be still for up to 2 hours  During active sleep, he kicks, rolls, and moves often  During the quiet awake state, he may only move his eyes  The active awake state includes strong kicks and rolls  What affects fetal movement:  You may feel your baby move more after you eat, or after you drink caffeine  You may feel your baby move less while you are more active, such as when you exercise  You may also feel fewer movements if you are obese  Certain medicines can change your baby's movements  Tell your healthcare provider about the medicines you are taking  Track fetal movements at home:  Fetal movement is most often felt when you lie quietly on your side  Your healthcare provider may ask you to count movements for 2 hours  He may ask you to track how long it takes for your baby to move 10 times  Keep a log of your baby's movements  Contact your healthcare provider or obstetrician if:   · It takes longer than usual to feel 10 of your unborn baby's movements  · You do not feel your unborn baby move at least 10 times in 2 hours  · The skin on your hands, feet, and around your eyes is more swollen than usual      · You have a headache for at least 24 hours  · Tiny red dots appear on your skin  · Your belly is tender when you press on it  · You have questions or concerns about your condition or care      Return to the emergency department if:   · You do not feel your unborn baby move for 12 hours  · You feel cramping or constant pain in your abdomen  · You have heavy bleeding from your vagina  · You have a severe headache and cannot see clearly  · You are having trouble breathing or are vomiting  · You have a seizure  © Copyright [address] Information is for End User's use only and may not be sold, redistributed or otherwise used for commercial purposes  All illustrations and images included in CareNotes® are the copyrighted property of A D A M , Inc  or [facility] Ubaldo Prado   The above information is an  only  It is not intended as medical advice for individual conditions or treatments  Talk to your doctor, nurse or pharmacist before following any medical regimen to see if it is safe and effective for you

## 2021-04-20 NOTE — PROGRESS NOTES
Dawna Oro presents today for routine OB visit at 30w4d  Blood Pressure: 124/66  Wt=70 8 kg (156 lb); Body mass index is 25 18 kg/m² ; TWG=9 707 kg (21 lb 6 4 oz)  Fetal Heart Rate: 156; Fundal Height (cm): 29 cm  Abdomen: gravid, soft, non-tender  She reports had a GI bug with vomiting and diarrhea, symptoms have resolved  Denies uterine contractions  Denies vaginal bleeding or leaking of fluid  Reports adequate fetal movement of at least 10 movements in 2 hours once daily  Last US was 4/5/2021, vtx, fetal growth at 48 %, YOVANNY 18 6cm, JANES 6/20/2021 at 29wks 1 d  Scheduled for ultrasound 6wks- 5/18/2021  Hx of PTD -[place] and LDASA 162 mgs daily until 36 wks  Has had TDAP  Tobacco use- down to 2 cigarettes daily-down from 1/2 pack- pt praised on this and plans to quit  Contraception- Nexplanon  Plans on breastfeeding  Plans on Epidural  Carrier for beta thalassemia---FOB to be tested-patient reports he was tested at her last pregnancy-pt will try to find [id number] at next appointment  28 wk labs done 3/23/2021, GTT -86, hgb 11 9, MCV 97, O+ bloodtype  Spoke with  at last visit due to increase in stressors, patient reports she feels better this week after  Reviewed premature labor precautions and fetal kick counts  Advised to continue medications and return in 2 weeks for OB visit  lynn in 1 week     COVID 19 precautions were discussed with patient, reviewed symptoms, masking, hygiene, social distancing, avoid high risk gatherings, patient to call office with questions or concerns  Declines information on COVID vaccine in pregnancy    Current Outpatient Medications   Medication Instructions    acetaminophen (TYLENOL) 1,000 mg, Oral, Every 8 hours PRN    aspirin 162 mg, Oral, Daily    doxylamine (UNISON) 12 5 mg, Oral, Daily at bedtime PRN    ferrous sulfate 325 mg, Oral, Daily with breakfast    Prenatal Vit-Fe Fumarate-FA (prenatal multivitamin) 28-0 8 MG TABS 1 tablet, Oral, Daily         Pregnancy Problems (from 12/16/20 to present)     Problem Noted Resolved    Encounter for counseling regarding contraception 12/28/2020 by Steven Machado MD 3/4/2021 by Thomas Alvares MD    Overview Signed 12/28/2020  3:53 PM by Steven Machado MD     Patient considering Nexplanon postpartum

## 2021-04-27 ENCOUNTER — CLINICAL SUPPORT (OUTPATIENT)
Dept: OBGYN CLINIC | Facility: CLINIC | Age: 26
End: 2021-04-27

## 2021-04-27 DIAGNOSIS — O09.892 HISTORY OF PREMATURE DELIVERY, CURRENTLY PREGNANT, SECOND TRIMESTER: ICD-10-CM

## 2021-04-27 PROCEDURE — 96372 THER/PROPH/DIAG INJ SC/IM: CPT

## 2021-04-27 RX ADMIN — HYDROXYPROGESTERONE CAPROATE 250 MG: 250 INJECTION INTRAMUSCULAR at 10:10

## 2021-04-27 NOTE — PROGRESS NOTES
[place]   Start at 16 weeks up until 36wks 6 days (end date):  o 1 mg/1ml weekly  injection, weekly   Gestational age: 28 1wk     Side effects: None reported   Given by: SAM Daniel Site: Alexander Floyd

## 2021-05-04 ENCOUNTER — ROUTINE PRENATAL (OUTPATIENT)
Dept: OBGYN CLINIC | Facility: CLINIC | Age: 26
End: 2021-05-04

## 2021-05-04 VITALS
HEIGHT: 66 IN | SYSTOLIC BLOOD PRESSURE: 134 MMHG | BODY MASS INDEX: 24.27 KG/M2 | WEIGHT: 151 LBS | HEART RATE: 103 BPM | DIASTOLIC BLOOD PRESSURE: 75 MMHG

## 2021-05-04 DIAGNOSIS — Z3A.32 32 WEEKS GESTATION OF PREGNANCY: ICD-10-CM

## 2021-05-04 DIAGNOSIS — Z34.93 PRENATAL CARE IN THIRD TRIMESTER: Primary | ICD-10-CM

## 2021-05-04 LAB
SL AMB  POCT GLUCOSE, UA: NORMAL
SL AMB POCT KETONES,UA: NORMAL
SL AMB POCT URINE PROTEIN: NORMAL

## 2021-05-04 PROCEDURE — 99213 OFFICE O/P EST LOW 20 MIN: CPT | Performed by: OBSTETRICS & GYNECOLOGY

## 2021-05-04 PROCEDURE — 96372 THER/PROPH/DIAG INJ SC/IM: CPT | Performed by: OBSTETRICS & GYNECOLOGY

## 2021-05-04 PROCEDURE — 81002 URINALYSIS NONAUTO W/O SCOPE: CPT | Performed by: OBSTETRICS & GYNECOLOGY

## 2021-05-04 RX ADMIN — HYDROXYPROGESTERONE CAPROATE 250 MG: 250 INJECTION INTRAMUSCULAR at 10:47

## 2021-05-04 NOTE — PROGRESS NOTES
OB/GYN  PN Visit  Severa Petrin  [id number]  2021  10:47 AM  Naresh Corbett MD    S: 30 y o  [id number] 32w4d here for PN visit  Patient is doing well and is very thankful to be 32 weeks with her history of  delivery in last pregnancy at 23 weeks  She reports [place] contractions and does have occasions where she has abdominal pain and back pain but nothing that has been sustained  She denies any loss of fluid or vaginal bleeding  She reports good fetal movement  O:  Vitals:    21 1000   BP: 134/75   Pulse: 103       Gen: no acute distress, nonlabored breathing, pleasant demeanor  OB exam completed: fundal height 29 cm, FHTs 150 bpm    A/P:  #1  32w4d GESTATION  Status post Tdap  Fundal height today 29 cm- growth on 2021 showing fetus measuring 43rd percentile  Growth ultrasound scheduled on 2021  Consideration for early GBS collection given history of  delivery  Continue LDASA   labor precautions reviewed  Fetal kick counts reviewed  RTC in 1 week for [place] and in 2 weeks for prenatal visit    #2  History prior  delivery x2  On Pat- received today    #3  Maternal tobacco use  Has cut down- continue to encourage cessation    #4   Contraception:  [facility]   Date Time Provider Karolina Ramos   2021  9:45 AM Navdeep Inmanłnhi [address] [place] [address] & [facility] & [facility]   2021 10:15 AM  US [address] Dipika Mayorga MD  2021  10:47 AM

## 2021-05-04 NOTE — PROGRESS NOTES
[place]   Start at 16 weeks up until 36wks 6 days (end date):   o 1 mg/1ml weekly injection   Gestational age: 31 1wk     Side effects: None reported   Given by: SAM Reed Site: Erin Uribe

## 2021-05-04 NOTE — PATIENT INSTRUCTIONS
Pregnancy at 31 to 34 Weeks   AMBULATORY CARE:   What changes are happening to your body: You may continue to have symptoms such as shortness of breath, heartburn, contractions, or swelling of your ankles and feet  You may be gaining about 1 pound a week now  Seek care immediately if:   · You develop a severe headache that does not go away  · You have new or increased vision changes, such as blurred or spotted vision  · You have new or increased swelling in your face or hands  · You have vaginal spotting or bleeding  · Your water broke or you feel warm water gushing or trickling from your vagina  Contact your healthcare provider if:   · You have more than 5 contractions in 1 hour  · You notice any changes in your baby's movements  · You have abdominal cramps, pressure, or tightening  · You have a change in vaginal discharge  · You have chills or a fever  · You have vaginal itching, burning, or pain  · You have yellow, green, white, or foul-smelling vaginal discharge  · You have pain or burning when you urinate, less urine than usual, or pink or bloody urine  · You have questions or concerns about your condition or care  How to care for yourself at this stage of your pregnancy:   · Eat a variety of healthy foods  Healthy foods include fruits, vegetables, whole-grain breads, low-fat dairy foods, beans, lean meats, and fish  Drink liquids as directed  Ask how much liquid to drink each day and which liquids are best for you  Limit caffeine to less than 200 milligrams each day  Limit your intake of fish to 2 servings each week  Choose fish low in mercury such as canned light tuna, shrimp, salmon, cod, or tilapia  Do not  eat fish high in mercury such as swordfish, tilefish, fernando mackerel, and shark  · Manage heartburn  by eating 4 or 5 small meals each day instead of large meals  Avoid spicy food  · Manage swelling  by lying down and putting your feet up  · Take prenatal vitamins as directed  Your need for certain vitamins and minerals, such as folic acid, increases during pregnancy  Prenatal vitamins provide some of the extra vitamins and minerals you need  Prenatal vitamins may also help to decrease the risk of certain birth defects  · Talk to your healthcare provider about exercise  Moderate exercise can help you stay fit  Your healthcare provider will help you plan an exercise program that is safe for you during pregnancy  · Do not smoke  Smoking increases your risk of a miscarriage and other health problems during your pregnancy  Smoking can cause your baby to be born too early or weigh less at birth  Ask your healthcare provider for information if you need help quitting  · Do not drink alcohol  Alcohol passes from your body to your baby through the placenta  It can affect your baby's brain development and cause fetal alcohol syndrome (FAS)  FAS is a group of conditions that causes mental, behavior, and growth problems  · Talk to your healthcare provider before you take any medicines  Many medicines may harm your baby if you take them when you are pregnant  Do not take any medicines, vitamins, herbs, or supplements without first talking to your healthcare provider  Never use illegal or street drugs (such as marijuana or cocaine) while you are pregnant  Safety tips during pregnancy:   · Avoid hot tubs and saunas  Do not use a hot tub or sauna while you are pregnant, especially during your first trimester  Hot tubs and saunas may raise your baby's temperature and increase the risk of birth defects  · Avoid toxoplasmosis  This is an infection caused by eating raw meat or being around infected cat feces  It can cause birth defects, miscarriages, and other problems  Wash your hands after you touch raw meat  Make sure any meat is well-cooked before you eat it  Avoid raw eggs and unpasteurized milk   Use gloves or ask someone else to clean your cat's litter box while you are pregnant  Changes that are happening with your baby:  By 34 weeks, your baby may weigh more than 5 pounds  Your baby will be about 12 ½ inches long from the top of the head to the rump (baby's bottom)  Your baby is gaining about ½ pound a week  Your baby's eyes open and close now  Your baby's kicks and movements are more forceful at this time  What you need to know about prenatal care: Your healthcare provider will check your blood pressure and weight  You may also need the following:  · A urine test  may also be done to check for sugar and protein  These can be signs of gestational diabetes or infection  Protein in your urine may also be a sign of preeclampsia  Preeclampsia is a condition that can develop during week 20 or later of your pregnancy  It causes high blood pressure, and it can cause problems with your kidneys and other organs  · A Tdap vaccine  may be recommended by your healthcare provider  · Fundal height  is a measurement of your uterus to check your baby's growth  This number is usually the same as the number of weeks that you have been pregnant  Your healthcare provider may also check your baby's position  · Your baby's heart rate  will be checked  © Copyright [address] [facility] [facility] Information is for End User's use only and may not be sold, redistributed or otherwise used for commercial purposes  All illustrations and images included in CareNotes® are the copyrighted property of A D A M , Inc  or [facility] Ubaldo Prado   The above information is an  only  It is not intended as medical advice for individual conditions or treatments  Talk to your doctor, nurse or pharmacist before following any medical regimen to see if it is safe and effective for you

## 2021-05-11 ENCOUNTER — CLINICAL SUPPORT (OUTPATIENT)
Dept: OBGYN CLINIC | Facility: CLINIC | Age: 26
End: 2021-05-11

## 2021-05-11 DIAGNOSIS — O09.892 HISTORY OF PREMATURE DELIVERY, CURRENTLY PREGNANT, SECOND TRIMESTER: ICD-10-CM

## 2021-05-11 PROCEDURE — 96372 THER/PROPH/DIAG INJ SC/IM: CPT

## 2021-05-11 RX ADMIN — HYDROXYPROGESTERONE CAPROATE 250 MG: 250 INJECTION INTRAMUSCULAR at 09:43

## 2021-05-11 NOTE — PROGRESS NOTES
[place]   Start at 16 weeks up until 36wks 6 days (end date):   o 1 mg/1ml weekly injection   Gestational age: 29 4wk     Side effects: None reported   Given by: SAM Wheeler Site: Rosemary Gramajo

## 2021-05-18 ENCOUNTER — ROUTINE PRENATAL (OUTPATIENT)
Dept: OBGYN CLINIC | Facility: CLINIC | Age: 26
End: 2021-05-18

## 2021-05-18 ENCOUNTER — TELEPHONE (OUTPATIENT)
Dept: OBGYN CLINIC | Facility: CLINIC | Age: 26
End: 2021-05-18

## 2021-05-18 ENCOUNTER — ULTRASOUND (OUTPATIENT)
Dept: PERINATAL CARE | Facility: CLINIC | Age: 26
End: 2021-05-18
Payer: COMMERCIAL

## 2021-05-18 VITALS
HEART RATE: 74 BPM | DIASTOLIC BLOOD PRESSURE: 72 MMHG | SYSTOLIC BLOOD PRESSURE: 121 MMHG | BODY MASS INDEX: 24.76 KG/M2 | HEIGHT: 66 IN

## 2021-05-18 VITALS
DIASTOLIC BLOOD PRESSURE: 72 MMHG | HEART RATE: 91 BPM | BODY MASS INDEX: 24.65 KG/M2 | HEIGHT: 66 IN | SYSTOLIC BLOOD PRESSURE: 111 MMHG | WEIGHT: 153.4 LBS

## 2021-05-18 DIAGNOSIS — Z3A.34 34 WEEKS GESTATION OF PREGNANCY: ICD-10-CM

## 2021-05-18 DIAGNOSIS — Z36.89 ENCOUNTER FOR ULTRASOUND TO CHECK FETAL GROWTH: ICD-10-CM

## 2021-05-18 DIAGNOSIS — O09.892 HISTORY OF PREMATURE DELIVERY, CURRENTLY PREGNANT, SECOND TRIMESTER: ICD-10-CM

## 2021-05-18 DIAGNOSIS — Z3A.34 34 WEEKS GESTATION OF PREGNANCY: Primary | ICD-10-CM

## 2021-05-18 DIAGNOSIS — O99.333 TOBACCO SMOKING AFFECTING PREGNANCY IN THIRD TRIMESTER: Primary | ICD-10-CM

## 2021-05-18 PROCEDURE — 76816 OB US FOLLOW-UP PER FETUS: CPT | Performed by: OBSTETRICS & GYNECOLOGY

## 2021-05-18 PROCEDURE — 99213 OFFICE O/P EST LOW 20 MIN: CPT | Performed by: OBSTETRICS & GYNECOLOGY

## 2021-05-18 PROCEDURE — 96372 THER/PROPH/DIAG INJ SC/IM: CPT | Performed by: OBSTETRICS & GYNECOLOGY

## 2021-05-18 PROCEDURE — 87150 DNA/RNA AMPLIFIED PROBE: CPT | Performed by: STUDENT IN AN ORGANIZED HEALTH CARE EDUCATION/TRAINING PROGRAM

## 2021-05-18 RX ADMIN — HYDROXYPROGESTERONE CAPROATE 250 MG: 250 INJECTION INTRAMUSCULAR at 11:55

## 2021-05-18 NOTE — PATIENT INSTRUCTIONS
Pregnancy at 28 to 38 Weeks   AMBULATORY CARE:   What changes are happening to your body: You are considered full term at the beginning of 37 weeks  Your breathing may be easier if your baby has moved down into a head-down position  You may need to urinate more often because the baby may be pressing on your bladder  You may also feel more discomfort and get tired easily  Seek care immediately if:   · You develop a severe headache that does not go away  · You have new or increased vision changes, such as blurred or spotted vision  · You have new or increased swelling in your face or hands  · You have vaginal spotting or bleeding  · Your water broke or you feel warm water gushing or trickling from your vagina  Contact your healthcare provider if:   · You have more than 5 contractions in 1 hour  · You notice any changes in your baby's movements  · You have abdominal cramps, pressure, or tightening  · You have a change in vaginal discharge  · You have chills or a fever  · You have vaginal itching, burning, or pain  · You have yellow, green, white, or foul-smelling vaginal discharge  · You have pain or burning when you urinate, less urine than usual, or pink or bloody urine  · You have questions or concerns about your condition or care  How to care for yourself at this stage of your pregnancy:   · Eat a variety of healthy foods  Healthy foods include fruits, vegetables, whole-grain breads, low-fat dairy foods, beans, lean meats, and fish  Drink liquids as directed  Ask how much liquid to drink each day and which liquids are best for you  Limit caffeine to less than 200 milligrams each day  Limit your intake of fish to 2 servings each week  Choose fish low in mercury such as canned light tuna, shrimp, salmon, cod, or tilapia  Do not  eat fish high in mercury such as swordfish, tilefish, fernando mackerel, and shark  · Take prenatal vitamins as directed    Your need for certain vitamins and minerals, such as folic acid, increases during pregnancy  Prenatal vitamins provide some of the extra vitamins and minerals you need  Prenatal vitamins may also help to decrease the risk of certain birth defects  · Rest as needed  Put your feet up if you have swelling in your ankles and feet  · Do not smoke  Smoking increases your risk of a miscarriage and other health problems during your pregnancy  Smoking can cause your baby to be born early or weigh less at birth  Ask your healthcare provider for information if you need help quitting  · Do not drink alcohol  Alcohol passes from your body to your baby through the placenta  It can affect your baby's brain development and cause fetal alcohol syndrome (FAS)  FAS is a group of conditions that causes mental, behavior, and growth problems  · Talk to your healthcare provider before you take any medicines  Many medicines may harm your baby if you take them when you are pregnant  Do not take any medicines, vitamins, herbs, or supplements without first talking to your healthcare provider  Never use illegal or street drugs (such as marijuana or cocaine) while you are pregnant  · Talk to your healthcare provider before you travel  You may not be able to travel in an airplane after 36 weeks  He may also recommend that you avoid long road trips  Safety tips during pregnancy:   · Avoid hot tubs and saunas  Do not use a hot tub or sauna while you are pregnant, especially during your first trimester  Hot tubs and saunas may raise your baby's temperature and increase the risk of birth defects  · Avoid toxoplasmosis  This is an infection caused by eating raw meat or being around infected cat feces  It can cause birth defects, miscarriages, and other problems  Wash your hands after you touch raw meat  Make sure any meat is well-cooked before you eat it  Avoid raw eggs and unpasteurized milk   Use gloves or ask someone else to clean your cat's litter box while you are pregnant  · Ask your healthcare provider about travel  The most comfortable time to travel is during the second trimester  Ask your healthcare provider if you can travel after 36 weeks  You may not be able to travel in an airplane after 36 weeks  He may also recommend that you avoid long road trips  Changes that are happening with your baby:  By 38 weeks, your baby may weigh between 6 and 9 pounds  Your baby may be about 14 inches long from the top of the head to the rump (baby's bottom)  Your baby hears well enough to know your voice  As your baby gets larger, you may feel fewer kicks and more stretching and rolling  Your baby may move into a head-down position  Your baby will also rest lower in your abdomen  What you need to know about prenatal care: Your healthcare provider will check your blood pressure and weight  You may also need the following:  · A urine test  may also be done to check for sugar and protein  These can be signs of gestational diabetes or infection  Protein in your urine may also be a sign of preeclampsia  Preeclampsia is a condition that can develop during week 20 or later of your pregnancy  It causes high blood pressure, and it can cause problems with your kidneys and other organs  · A blood test  may be done to check for anemia (low iron level)  · A Tdap vaccine  may be recommended by your healthcare provider  · A group B strep test  is a test that is done to check for group B strep infection  Group B strep is a type of bacteria that may be found in the vagina or rectum  It can be passed to your baby during delivery if you have it  Your healthcare provider will take swab your vagina or rectum and send the sample to the lab for tests  · Fundal height  is a measurement of your uterus to check your baby's growth  This number is usually the same as the number of weeks that you have been pregnant   Your healthcare provider may also check your baby's position  · Your baby's heart rate  will be checked  © Copyright [address] Information is for End User's use only and may not be sold, redistributed or otherwise used for commercial purposes  All illustrations and images included in CareNotes® are the copyrighted property of A D A M , Inc  or Homer Prado   The above information is an  only  It is not intended as medical advice for individual conditions or treatments  Talk to your doctor, nurse or pharmacist before following any medical regimen to see if it is safe and effective for you

## 2021-05-18 NOTE — PROGRESS NOTES
OB/GYN  PN Visit  Versie Diss  [id number]  2021  11:43 AM  Real Alexandre MD    S: 30 y o  [id number] 34w4d here for PN visit  Patient appears well and is very excited about being 34 weeks gestation  She reports she is having jesús zazueta, but not regular contractiopns  Denies LOF or VB  She reports giood fetal movement  Patient has been titration down cigarettes- now smoking 2 cigarettes a day  Has noticed some mood swings  Has been getting ready for baby's arrival      O:  Vitals:    21 1129   BP: 121/72   Pulse: 74       Gen: no acute distress, nonlabored breathing, pleasant demeanor  OB exam completed: fundal height 34,  bpm    A/P:  #1  34w4d GESTATION  GBS collected today given h/o PTD, no PCN allergy   growth ultrasound today showing EFW 2350 grams - 5 lbs 3 oz (32%), VTX  No further u/s recommended  Labor precautions reviewed  Fetal kick counts reviewed  RTC in 1 week for [facility] and in 2 weeks for prenatal visit    #2  H/o PTD at 21 and 35 weeks  On [place], received today and to be discontinued at 36 weeks gestation    #3  Tobacco use in pregnancy  2 cigarettes/day- reviewed and encouraged cessation to reduce risks in pregnancy and for   Reviewed patches would not be recommended    #4   Contraception:  Considering nexplanon    Future Appointments   Date Time Provider Karolina Ramos   2021 10:15 AM Navdeep Claire [facility] [address] [place] [address] & NURSING HOME     Real Alexandre MD  2021  11:43 AM

## 2021-05-18 NOTE — PROGRESS NOTES
[address]: Ms Kevin Dominique was seen today at 34w4d for fetal growth assessment ultrasound  See ultrasound report under "OB Procedures" tab  She expressed to the sonographer that she declined to be seen by me therefore I did not  her in person  Please don't hesitate to contact our office with any concerns or questions    Heather Sullivan MD

## 2021-05-18 NOTE — TELEPHONE ENCOUNTER
Keri Hong from [facility] left message on nurse line re: [place] refill  RN consulted with provider and advised Darlyn from [facility] pt will need one more refill to be sent to [facility]- before 6/1 geraldt  Darlyn informed RN medication will be delivered on 5/26  RN agreed with plan

## 2021-05-18 NOTE — PROGRESS NOTES
[place]   Start at 16 weeks up until 36wks 6 days (end date):Yes  o 250 mg/1ml weekly injection    Gestational age: 28 2wk     Side effects: None  reported   Given by: SAM Ryder Site: Ed Mazariegos

## 2021-05-18 NOTE — LETTER
May 18, 2021     Marge Wheeler, [address] [facility] [address]    Patient: Jasmina Castro   YOB: 1995   Date of Visit: 5/18/2021       Dear Dr Chad Flores: Thank you for referring Madison Sellers to me for evaluation  Below are my notes for this consultation  If you have questions, please do not hesitate to call me  I look forward to following your patient along with you  Sincerely,        Aleyda Alford MD        CC: No Recipients  Aleyda Alford MD  5/18/2021 10:47 AM  Sign when Signing Visit  [address]: Ms Aria Varma was seen today at 34w4d for fetal growth assessment ultrasound  See ultrasound report under "OB Procedures" tab  She expressed to the sonographer that she declined to be seen by me therefore I did not  her in person  Please don't hesitate to contact our office with any concerns or questions    Aleyda Alford MD

## 2021-05-20 ENCOUNTER — TELEPHONE (OUTPATIENT)
Dept: OBGYN CLINIC | Facility: CLINIC | Age: 26
End: 2021-05-20

## 2021-05-20 LAB — GP B STREP DNA SPEC QL NAA+PROBE: NEGATIVE

## 2021-05-20 NOTE — TELEPHONE ENCOUNTER
----- Message from Sukhjinder Weathers MD sent at 5/20/2021  7:24 AM EDT -----  GBS negative- mychart message sent

## 2021-05-24 ENCOUNTER — TELEPHONE (OUTPATIENT)
Dept: PERINATAL CARE | Facility: CLINIC | Age: 26
End: 2021-05-24

## 2021-05-24 NOTE — TELEPHONE ENCOUNTER
Spoke to pt she states she is feeling better the pain only happens when she overworks herself  I told pt if she feels like she cant tolerate the pain she will need to go to L&D  Pt verbalized understanding

## 2021-05-24 NOTE — TELEPHONE ENCOUNTER
I spoke to Jennyfer Tk who left a message on the [facility] portal   Pt is c/o "jesús zazueta" ctx, 3 in one hour that are uncomfortable  She stated that she was doing a lot of work yesterday  Pt denies LOF, VB, +FM  PTL precautions reviewed with patient  Pt states she has an appointment at [facility] tomorrow  I advised pt to call he primary OBGYN with these complaints and that I would route this message to them

## 2021-05-24 NOTE — TELEPHONE ENCOUNTER
----- Message from [address] [facility]  Lori Koch sent at 5/24/2021  8:34 AM EDT -----  Regarding: Non-Urgent Medical Question  Contact: [phone]  Good morning I'm currently in my last trimester and I'm 36 weeks 3days pregnant and my [place] 37 getting very uncomfortable too deal with, is there anything that I can do too maintain the pain because it's getting too hard too deal with

## 2021-05-25 ENCOUNTER — TELEPHONE (OUTPATIENT)
Dept: OBGYN CLINIC | Facility: CLINIC | Age: 26
End: 2021-05-25

## 2021-05-25 ENCOUNTER — CLINICAL SUPPORT (OUTPATIENT)
Dept: OBGYN CLINIC | Facility: CLINIC | Age: 26
End: 2021-05-25

## 2021-05-25 DIAGNOSIS — O09.219 PREVIOUS PRETERM DELIVERY, ANTEPARTUM: ICD-10-CM

## 2021-05-25 PROCEDURE — 96372 THER/PROPH/DIAG INJ SC/IM: CPT

## 2021-05-25 RX ADMIN — HYDROXYPROGESTERONE CAPROATE 250 MG: 250 INJECTION INTRAMUSCULAR at 10:49

## 2021-05-25 NOTE — PROGRESS NOTES
[place]   Start at 16 weeks up until 36wks 6 days (end date):   o 1 mg/1ml weekly injection    Gestational age: 33 4wk     Side effects: None reported   Given by: SAM Tee Site: Clifton Ospina

## 2021-06-01 ENCOUNTER — ROUTINE PRENATAL (OUTPATIENT)
Dept: OBGYN CLINIC | Facility: CLINIC | Age: 26
End: 2021-06-01

## 2021-06-01 VITALS
BODY MASS INDEX: 22.5 KG/M2 | WEIGHT: 140 LBS | HEART RATE: 93 BPM | SYSTOLIC BLOOD PRESSURE: 112 MMHG | DIASTOLIC BLOOD PRESSURE: 78 MMHG | HEIGHT: 66 IN

## 2021-06-01 DIAGNOSIS — O09.893 HISTORY OF PREMATURE DELIVERY, CURRENTLY PREGNANT, THIRD TRIMESTER: ICD-10-CM

## 2021-06-01 DIAGNOSIS — Z3A.36 36 WEEKS GESTATION OF PREGNANCY: Primary | ICD-10-CM

## 2021-06-01 DIAGNOSIS — O99.333 TOBACCO SMOKING AFFECTING PREGNANCY IN THIRD TRIMESTER: ICD-10-CM

## 2021-06-01 DIAGNOSIS — Z34.93 PRENATAL CARE, THIRD TRIMESTER: ICD-10-CM

## 2021-06-01 LAB
SL AMB  POCT GLUCOSE, UA: NORMAL
SL AMB POCT URINE PROTEIN: NORMAL

## 2021-06-01 PROCEDURE — 81002 URINALYSIS NONAUTO W/O SCOPE: CPT | Performed by: OBSTETRICS & GYNECOLOGY

## 2021-06-01 PROCEDURE — 99213 OFFICE O/P EST LOW 20 MIN: CPT | Performed by: OBSTETRICS & GYNECOLOGY

## 2021-06-01 NOTE — PROGRESS NOTES
[place]   Start at 16 weeks up until 36wks 6 days (end date):  o 250 mg/1ml weekly injection   o 275 mg subcutaneous injection, weekly   Gestational age: 42 weeks  4 days     Side effects: no   Given by, site: [place], [place]   Site: LEFT UPPER QUADRANT

## 2021-06-02 ENCOUNTER — HOSPITAL ENCOUNTER (OUTPATIENT)
Facility: HOSPITAL | Age: 26
Discharge: HOME | DRG: 560 | End: 2021-06-02
Attending: OBSTETRICS & GYNECOLOGY | Admitting: OBSTETRICS & GYNECOLOGY
Payer: COMMERCIAL

## 2021-06-02 ENCOUNTER — TELEPHONE (OUTPATIENT)
Dept: OBGYN CLINIC | Facility: CLINIC | Age: 26
End: 2021-06-02

## 2021-06-02 VITALS
HEART RATE: 82 BPM | DIASTOLIC BLOOD PRESSURE: 67 MMHG | SYSTOLIC BLOOD PRESSURE: 120 MMHG | TEMPERATURE: 98.4 F | RESPIRATION RATE: 18 BRPM

## 2021-06-02 PROBLEM — Z3A.36 36 WEEKS GESTATION OF PREGNANCY: Status: ACTIVE | Noted: 2021-04-06

## 2021-06-02 PROCEDURE — 99203 OFFICE O/P NEW LOW 30 MIN: CPT

## 2021-06-02 NOTE — H&P
Triage Note - OB  Delmy Wilson 30 y o  female MRN: [id number]  Unit/Bed#: LD TRIAGE  Encounter: [id number]    Chief Complaint:Contractions  JANES: Estimated Date of Delivery: 21    HPI: Patient is a [id number] at 36w5d here complaining of uterine contractions: Starting last night at [address], has had painful UCs, initially every hour, now more frequently, but unclear frequency  At 1000, wiped after using bathroom, and saw blood on tissue  Then felt pressure, and saw 4 drops of blood on floor  No gush of fluid, no trickle   (+)FM  All/Denies  Rx/ASA, [employer], PNVs  PMH/Denies  PSH/Denies x Ob-related  FH/No birth defects  SH/, homemaker, (+)tob, no EtOH or illicit drug use    Vitals: Blood pressure 120/67, pulse 82, temperature 98 4 °F (36 9 °C), temperature source Oral, resp  rate 18, last menstrual period 10/09/2020, unknown if currently breastfeeding  ,There is no height or weight on file to calculate BMI  Physical Exam  GEN: Alert comfortable NAD  Fundus Nontender  SVE: No blood  Dilation: 1cm/50% effaced  Speculum: Negative pooling  (+)nitrazine  Negative ferning  FHR: Baseline: 130 bpm, moderate variability, reactive, no decels  [place]: Irregular  Frequent brief contractions  Longer UCs at 1239, 1309        Labs: No results found for this or any previous visit (from the past 24 hour(s))  Imaging: Bedside ultrasound: Cephalic  MVP 6 35cm    Lab, Imaging and other studies: I have personally reviewed pertinent reports  A/P: 30 y o  [id number] at 36 5 weeks  1)  contractions  No bleeding  Not ruptured (Neg P/F, (+)N, but had scant amt blood earlier)  Cervix 1cm dilated  Irregular contractions on toco   --> Observe  --> Reexam 1-2 hours  2) Fetal status reassuring  Reactive NST   --> Continue prenatal care  --> FKCs

## 2021-06-02 NOTE — TELEPHONE ENCOUNTER
Pt reported blood with wiping in AM  Pt denied leakage of fluid  Pt was seen yesterday at [facility]-  Pt was advised to go to [employer] L&D triage for further evaluation  Pt had a verbal understanding and was comfortable with the plan

## 2021-06-02 NOTE — PROGRESS NOTES
L&D Triage Note - OB/GYN  Saran Rahman 30 y o  female MRN: [id number]  Unit/Bed#: LD TRIAGE  Encounter: [id number]        Patient is seen by [facility]    ASSESSMENT/PLAN  Saran Rahman is a 30 y o  [id number] at 36w5d who presented by squad for contractions and vaginal spotting  She was discharged home with labor precautions  1) R/o  labor  - SVE is unchanged from earlier exam  - Cervix is posterior and firm  - Low suspicion for  labor at this time  - Discussed that the vaginal spotting (4 drops) and discharge could have been from her earlier SVE in the office yesterday  - She is understandably anxious, secondary to her obstetric history of a  delivery at 23 weeks    SVE:  Cervical Dilation: 1  Cervical Effacement: 50  Fetal Station: -3  Presentation: Vertex  Method: Manual  OB Examiner: Aravindt    FHT:  Baseline Rate: 135 bpm  Variability: Moderate 6-25 bpm  Accelerations: 15 x 15 or greater  Decelerations: None  FHR Category: Category I    TOCO:   Contraction Frequency (minutes): irritability  Contraction Quality: Mild      2)  Discharge instructions  - Patient instructed to call if experiencing worsening contractions, vaginal bleeding, loss of fluid or decreased fetal movement  - Will follow up with OBGYN in office next week, has appt w/ Dr Viki Heck    D/w Dr Kadi Woods  ______________    SUBJECTIVE    JANES: Estimated Date of Delivery: 21    HPI:  Please see earlier triage note from Dr Kadi Woods for full HPI    Physical Exam  GEN: Well appearing, no apparent distress, resting on triage stretcher   ABD: Gravid, soft  SVE: Cervical Dilation: 1  Cervical Effacement: 50  Fetal Station: -3  Presentation: Vertex  Method: Manual  OB Examiner: Ackert    OBJECTIVE:  /67 (BP Location: Right arm)   Pulse 82   Temp 98 4 °F (36 9 °C) (Oral)   Resp 18   LMP 10/09/2020   There is no height or weight on file to calculate BMI    Labs: No results found for this or any previous visit (from the past 24 hour(s))  Pratibha Villarreal DO  OB/GYN PGY-1  6/2/2021  3:04 PM      Portions of the record may have been created with voice recognition software  Occasional wrong word or "sound a like" substitutions may have occurred due to the inherent limitations of voice recognition software    Read the chart carefully and recognize, using context, where substitutions have occurred

## 2021-06-02 NOTE — PROGRESS NOTES
OB/GYN prenatal visit    S: 30 y o  [id number] 36w5d here for PN visit  She has continued [employer] contractions per her report, worse at night, that are improved with massage  She was counseled extensively for need for increased hydration as patient reports little water intake and prefers soda and juice  She has no other obstetric complaints, including pelvic pain, contractions, vaginal bleeding, loss of fluid, or decreased fetal movement  Patient received last [place] today and given history of previous  birth decision was made to evaluate cervix for dilation  Additionally, patient had been doing well with cigarette reduction but had a relapse of increased use around the anniversary of the birth of her  son      O:  Vitals:    21 1041   BP: 112/78   Pulse: 93       Gen: no acute distress, nonlabored breathing   Abd: soft, nontender  Fundal height: 36 cm  FHR: 140 bpm  SVE: closed, thick, high, soft       A/P:      IUP at 36w5d  No obstetric complaints today  TWG: + 5 lbs   Flu vaccine declined, TDAP vaccine 2021  Contraception: Nexplanon  Breastfeeding: Yes and bottle  Birth plan: plans on epidural  Discussed  labor precautions and fetal kick counts    Return to clinic in 1 weeks  GBS will need to be repeated Joaquina 15 if undelivered    COVID 19 precautions were discussed with patient at length, reviewed symptoms, hygiene, social distancing, patient to call office  with Bhavana Kumar MD  2021  10:29 AM

## 2021-06-03 ENCOUNTER — ANESTHESIA (INPATIENT)
Dept: ANESTHESIOLOGY | Facility: HOSPITAL | Age: 26
DRG: 560 | End: 2021-06-03
Payer: COMMERCIAL

## 2021-06-03 ENCOUNTER — ANESTHESIA EVENT (INPATIENT)
Dept: ANESTHESIOLOGY | Facility: HOSPITAL | Age: 26
DRG: 560 | End: 2021-06-03
Payer: COMMERCIAL

## 2021-06-03 ENCOUNTER — HOSPITAL ENCOUNTER (INPATIENT)
Facility: HOSPITAL | Age: 26
LOS: 2 days | Discharge: HOME | DRG: 560 | End: 2021-06-05
Attending: OBSTETRICS & GYNECOLOGY | Admitting: OBSTETRICS & GYNECOLOGY
Payer: COMMERCIAL

## 2021-06-03 DIAGNOSIS — Z3A.36 36 WEEKS GESTATION OF PREGNANCY: Primary | ICD-10-CM

## 2021-06-03 PROBLEM — O60.03 PRETERM LABOR IN THIRD TRIMESTER: Status: ACTIVE | Noted: 2021-06-03

## 2021-06-03 LAB
ABO GROUP BLD: NORMAL
APTT PPP: 32 SECONDS (ref 23–37)
BASE EXCESS BLDCOA CALC-SCNC: -2.7 MMOL/L (ref 3–11)
BASE EXCESS BLDCOV CALC-SCNC: -3.4 MMOL/L (ref 1–9)
BASOPHILS # BLD AUTO: 0.03 THOUSANDS/ΜL (ref 0–0.1)
BASOPHILS NFR BLD AUTO: 0 % (ref 0–1)
BLD GP AB SCN SERPL QL: NEGATIVE
EOSINOPHIL # BLD AUTO: 0.09 THOUSAND/ΜL (ref 0–0.61)
EOSINOPHIL NFR BLD AUTO: 1 % (ref 0–6)
ERYTHROCYTE [DISTWIDTH] IN BLOOD BY AUTOMATED COUNT: 14.2 % (ref 11.6–15.1)
ERYTHROCYTE [DISTWIDTH] IN BLOOD BY AUTOMATED COUNT: 14.5 % (ref 11.6–15.1)
FIBRINOGEN PPP-MCNC: 385 MG/DL (ref 227–495)
HCO3 BLDCOA-SCNC: 22.7 MMOL/L (ref 17.3–27.3)
HCO3 BLDCOV-SCNC: 22.3 MMOL/L (ref 12.2–28.6)
HCT VFR BLD AUTO: 35.7 % (ref 34.8–46.1)
HCT VFR BLD AUTO: 37.5 % (ref 34.8–46.1)
HGB BLD-MCNC: 11.2 G/DL (ref 11.5–15.4)
HGB BLD-MCNC: 11.7 G/DL (ref 11.5–15.4)
IMM GRANULOCYTES # BLD AUTO: 0.15 THOUSAND/UL (ref 0–0.2)
IMM GRANULOCYTES NFR BLD AUTO: 1 % (ref 0–2)
INR PPP: 1.03 (ref 0.84–1.19)
LYMPHOCYTES # BLD AUTO: 2.71 THOUSANDS/ΜL (ref 0.6–4.47)
LYMPHOCYTES NFR BLD AUTO: 25 % (ref 14–44)
MCH RBC QN AUTO: 31 PG (ref 26.8–34.3)
MCH RBC QN AUTO: 31.1 PG (ref 26.8–34.3)
MCHC RBC AUTO-ENTMCNC: 31.2 G/DL (ref 31.4–37.4)
MCHC RBC AUTO-ENTMCNC: 31.4 G/DL (ref 31.4–37.4)
MCV RBC AUTO: 100 FL (ref 82–98)
MCV RBC AUTO: 99 FL (ref 82–98)
MONOCYTES # BLD AUTO: 0.99 THOUSAND/ΜL (ref 0.17–1.22)
MONOCYTES NFR BLD AUTO: 9 % (ref 4–12)
NEUTROPHILS # BLD AUTO: 6.8 THOUSANDS/ΜL (ref 1.85–7.62)
NEUTS SEG NFR BLD AUTO: 64 % (ref 43–75)
NRBC BLD AUTO-RTO: 0 /100 WBCS
O2 CT VFR BLDCOA CALC: 14.4 ML/DL
OXYHGB MFR BLDCOA: 72.4 %
OXYHGB MFR BLDCOV: 73 %
PCO2 BLDCOA: 41.6 MM[HG] (ref 30–60)
PCO2 BLDCOV: 42.3 MM HG (ref 27–43)
PH BLDCOA: 7.35 [PH] (ref 7.23–7.43)
PH BLDCOV: 7.34 [PH] (ref 7.19–7.49)
PLATELET # BLD AUTO: 149 THOUSANDS/UL (ref 149–390)
PLATELET # BLD AUTO: 165 THOUSANDS/UL (ref 149–390)
PMV BLD AUTO: 10 FL (ref 8.9–12.7)
PMV BLD AUTO: 9.6 FL (ref 8.9–12.7)
PO2 BLDCOA: 30.1 MM HG (ref 5–25)
PO2 BLDCOV: 30.7 MM HG (ref 15–45)
PROTHROMBIN TIME: 13.6 SECONDS (ref 11.6–14.5)
RBC # BLD AUTO: 3.61 MILLION/UL (ref 3.81–5.12)
RBC # BLD AUTO: 3.76 MILLION/UL (ref 3.81–5.12)
RH BLD: POSITIVE
RPR SER QL: NORMAL
SAO2 % BLDCOV: 15.2 ML/DL
SPECIMEN EXPIRATION DATE: NORMAL
WBC # BLD AUTO: 10.77 THOUSAND/UL (ref 4.31–10.16)
WBC # BLD AUTO: 8.93 THOUSAND/UL (ref 4.31–10.16)

## 2021-06-03 PROCEDURE — 86901 BLOOD TYPING SEROLOGIC RH(D): CPT | Performed by: OBSTETRICS & GYNECOLOGY

## 2021-06-03 PROCEDURE — 4A1HXCZ MONITORING OF PRODUCTS OF CONCEPTION, CARDIAC RATE, EXTERNAL APPROACH: ICD-10-PCS | Performed by: OBSTETRICS & GYNECOLOGY

## 2021-06-03 PROCEDURE — 85027 COMPLETE CBC AUTOMATED: CPT | Performed by: OBSTETRICS & GYNECOLOGY

## 2021-06-03 PROCEDURE — 86850 RBC ANTIBODY SCREEN: CPT | Performed by: OBSTETRICS & GYNECOLOGY

## 2021-06-03 PROCEDURE — 85025 COMPLETE CBC W/AUTO DIFF WBC: CPT | Performed by: OBSTETRICS & GYNECOLOGY

## 2021-06-03 PROCEDURE — 85610 PROTHROMBIN TIME: CPT | Performed by: OBSTETRICS & GYNECOLOGY

## 2021-06-03 PROCEDURE — 85384 FIBRINOGEN ACTIVITY: CPT | Performed by: OBSTETRICS & GYNECOLOGY

## 2021-06-03 PROCEDURE — 10907ZC DRAINAGE OF AMNIOTIC FLUID, THERAPEUTIC FROM PRODUCTS OF CONCEPTION, VIA NATURAL OR ARTIFICIAL OPENING: ICD-10-PCS | Performed by: OBSTETRICS & GYNECOLOGY

## 2021-06-03 PROCEDURE — 99213 OFFICE O/P EST LOW 20 MIN: CPT

## 2021-06-03 PROCEDURE — 59409 OBSTETRICAL CARE: CPT | Performed by: OBSTETRICS & GYNECOLOGY

## 2021-06-03 PROCEDURE — 85730 THROMBOPLASTIN TIME PARTIAL: CPT | Performed by: OBSTETRICS & GYNECOLOGY

## 2021-06-03 PROCEDURE — 86900 BLOOD TYPING SEROLOGIC ABO: CPT | Performed by: OBSTETRICS & GYNECOLOGY

## 2021-06-03 PROCEDURE — 86592 SYPHILIS TEST NON-TREP QUAL: CPT | Performed by: OBSTETRICS & GYNECOLOGY

## 2021-06-03 PROCEDURE — 82805 BLOOD GASES W/O2 SATURATION: CPT | Performed by: OBSTETRICS & GYNECOLOGY

## 2021-06-03 RX ORDER — OXYTOCIN/RINGER'S LACTATE 30/500 ML
PLASTIC BAG, INJECTION (ML) INTRAVENOUS
Status: COMPLETED
Start: 2021-06-03 | End: 2021-06-03

## 2021-06-03 RX ORDER — OXYTOCIN/RINGER'S LACTATE 30/500 ML
250 PLASTIC BAG, INJECTION (ML) INTRAVENOUS CONTINUOUS
Status: DISCONTINUED | OUTPATIENT
Start: 2021-06-03 | End: 2021-06-05 | Stop reason: HOSPADM

## 2021-06-03 RX ORDER — DIPHENHYDRAMINE HCL 25 MG
25 TABLET ORAL EVERY 6 HOURS PRN
Status: DISCONTINUED | OUTPATIENT
Start: 2021-06-03 | End: 2021-06-05 | Stop reason: HOSPADM

## 2021-06-03 RX ORDER — CARBOPROST TROMETHAMINE 250 UG/ML
INJECTION, SOLUTION INTRAMUSCULAR
Status: COMPLETED
Start: 2021-06-03 | End: 2021-06-03

## 2021-06-03 RX ORDER — OXYTOCIN/RINGER'S LACTATE 30/500 ML
62.5 PLASTIC BAG, INJECTION (ML) INTRAVENOUS
Status: ACTIVE | OUTPATIENT
Start: 2021-06-03 | End: 2021-06-03

## 2021-06-03 RX ORDER — ONDANSETRON 2 MG/ML
4 INJECTION INTRAMUSCULAR; INTRAVENOUS EVERY 4 HOURS PRN
Status: DISCONTINUED | OUTPATIENT
Start: 2021-06-03 | End: 2021-06-03

## 2021-06-03 RX ORDER — DOCUSATE SODIUM 100 MG/1
100 CAPSULE, LIQUID FILLED ORAL 2 TIMES DAILY
Status: DISCONTINUED | OUTPATIENT
Start: 2021-06-03 | End: 2021-06-05 | Stop reason: HOSPADM

## 2021-06-03 RX ORDER — ACETAMINOPHEN 325 MG/1
650 TABLET ORAL EVERY 6 HOURS PRN
Status: DISCONTINUED | OUTPATIENT
Start: 2021-06-03 | End: 2021-06-05 | Stop reason: HOSPADM

## 2021-06-03 RX ORDER — LIDOCAINE HYDROCHLORIDE AND EPINEPHRINE 15; 5 MG/ML; UG/ML
INJECTION, SOLUTION EPIDURAL
Status: COMPLETED | OUTPATIENT
Start: 2021-06-03 | End: 2021-06-03

## 2021-06-03 RX ORDER — METHYLERGONOVINE MALEATE 0.2 MG/ML
0.2 INJECTION INTRAVENOUS ONCE
Status: COMPLETED | OUTPATIENT
Start: 2021-06-03 | End: 2021-06-03

## 2021-06-03 RX ORDER — DIAPER,BRIEF,INFANT-TODD,DISP
1 EACH MISCELLANEOUS 4 TIMES DAILY PRN
Status: DISCONTINUED | OUTPATIENT
Start: 2021-06-03 | End: 2021-06-05 | Stop reason: HOSPADM

## 2021-06-03 RX ORDER — METHYLERGONOVINE MALEATE 0.2 MG/ML
INJECTION INTRAVENOUS
Status: COMPLETED
Start: 2021-06-03 | End: 2021-06-03

## 2021-06-03 RX ORDER — SIMETHICONE 80 MG
80 TABLET,CHEWABLE ORAL 4 TIMES DAILY PRN
Status: DISCONTINUED | OUTPATIENT
Start: 2021-06-03 | End: 2021-06-05 | Stop reason: HOSPADM

## 2021-06-03 RX ORDER — IBUPROFEN 600 MG/1
600 TABLET ORAL EVERY 6 HOURS PRN
Status: DISCONTINUED | OUTPATIENT
Start: 2021-06-03 | End: 2021-06-05 | Stop reason: HOSPADM

## 2021-06-03 RX ORDER — LOPERAMIDE HYDROCHLORIDE 2 MG/1
2 CAPSULE ORAL 4 TIMES DAILY PRN
Status: DISCONTINUED | OUTPATIENT
Start: 2021-06-03 | End: 2021-06-05 | Stop reason: HOSPADM

## 2021-06-03 RX ORDER — LIDOCAINE HYDROCHLORIDE 10 MG/ML
INJECTION, SOLUTION EPIDURAL; INFILTRATION; INTRACAUDAL; PERINEURAL
Status: COMPLETED | OUTPATIENT
Start: 2021-06-03 | End: 2021-06-03

## 2021-06-03 RX ORDER — ONDANSETRON 2 MG/ML
INJECTION INTRAMUSCULAR; INTRAVENOUS
Status: COMPLETED
Start: 2021-06-03 | End: 2021-06-03

## 2021-06-03 RX ORDER — CALCIUM CARBONATE 200(500)MG
1000 TABLET,CHEWABLE ORAL DAILY PRN
Status: DISCONTINUED | OUTPATIENT
Start: 2021-06-03 | End: 2021-06-05 | Stop reason: HOSPADM

## 2021-06-03 RX ORDER — SODIUM CHLORIDE, SODIUM LACTATE, POTASSIUM CHLORIDE, CALCIUM CHLORIDE 600; 310; 30; 20 MG/100ML; MG/100ML; MG/100ML; MG/100ML
125 INJECTION, SOLUTION INTRAVENOUS CONTINUOUS
Status: DISCONTINUED | OUTPATIENT
Start: 2021-06-03 | End: 2021-06-03

## 2021-06-03 RX ORDER — OXYTOCIN/RINGER'S LACTATE 30/500 ML
250 PLASTIC BAG, INJECTION (ML) INTRAVENOUS CONTINUOUS
Status: DISCONTINUED | OUTPATIENT
Start: 2021-06-03 | End: 2021-06-03

## 2021-06-03 RX ORDER — ONDANSETRON 2 MG/ML
4 INJECTION INTRAMUSCULAR; INTRAVENOUS EVERY 8 HOURS PRN
Status: DISCONTINUED | OUTPATIENT
Start: 2021-06-03 | End: 2021-06-05 | Stop reason: HOSPADM

## 2021-06-03 RX ORDER — KETOROLAC TROMETHAMINE 30 MG/ML
15 INJECTION, SOLUTION INTRAMUSCULAR; INTRAVENOUS ONCE
Status: COMPLETED | OUTPATIENT
Start: 2021-06-03 | End: 2021-06-03

## 2021-06-03 RX ORDER — OXYCODONE HYDROCHLORIDE 5 MG/1
2.5 TABLET ORAL EVERY 4 HOURS PRN
Status: DISCONTINUED | OUTPATIENT
Start: 2021-06-03 | End: 2021-06-05 | Stop reason: HOSPADM

## 2021-06-03 RX ADMIN — ROPIVACAINE HYDROCHLORIDE: 2 INJECTION, SOLUTION EPIDURAL; INFILTRATION at 06:20

## 2021-06-03 RX ADMIN — ONDANSETRON 4 MG: 2 INJECTION INTRAMUSCULAR; INTRAVENOUS at 14:59

## 2021-06-03 RX ADMIN — IBUPROFEN 600 MG: 600 TABLET, FILM COATED ORAL at 12:54

## 2021-06-03 RX ADMIN — ONDANSETRON 4 MG: 2 INJECTION INTRAMUSCULAR; INTRAVENOUS at 07:45

## 2021-06-03 RX ADMIN — CARBOPROST TROMETHAMINE 250 MCG: 250 INJECTION, SOLUTION INTRAMUSCULAR at 14:48

## 2021-06-03 RX ADMIN — SODIUM CHLORIDE, SODIUM LACTATE, POTASSIUM CHLORIDE, AND CALCIUM CHLORIDE 125 ML/HR: .6; .31; .03; .02 INJECTION, SOLUTION INTRAVENOUS at 06:24

## 2021-06-03 RX ADMIN — SODIUM CHLORIDE, SODIUM LACTATE, POTASSIUM CHLORIDE, AND CALCIUM CHLORIDE 999 ML/HR: .6; .31; .03; .02 INJECTION, SOLUTION INTRAVENOUS at 05:37

## 2021-06-03 RX ADMIN — Medication 62.5 MILLI-UNITS/MIN: at 11:12

## 2021-06-03 RX ADMIN — LIDOCAINE HYDROCHLORIDE 5 ML: 10 INJECTION, SOLUTION EPIDURAL; INFILTRATION; INTRACAUDAL at 06:21

## 2021-06-03 RX ADMIN — BENZOCAINE AND LEVOMENTHOL: 200; 5 SPRAY TOPICAL at 12:54

## 2021-06-03 RX ADMIN — Medication 250 MILLI-UNITS/MIN: at 09:06

## 2021-06-03 RX ADMIN — METHYLERGONOVINE MALEATE 0.2 MG: 0.2 INJECTION INTRAVENOUS at 14:22

## 2021-06-03 RX ADMIN — OXYCODONE HYDROCHLORIDE 2.5 MG: 5 TABLET ORAL at 20:02

## 2021-06-03 RX ADMIN — LIDOCAINE HYDROCHLORIDE AND EPINEPHRINE 3 ML: 15; 5 INJECTION, SOLUTION EPIDURAL at 06:21

## 2021-06-03 RX ADMIN — LOPERAMIDE HYDROCHLORIDE 2 MG: 2 CAPSULE ORAL at 16:42

## 2021-06-03 RX ADMIN — KETOROLAC TROMETHAMINE 15 MG: 30 INJECTION, SOLUTION INTRAMUSCULAR at 15:04

## 2021-06-03 RX ADMIN — SODIUM CHLORIDE, SODIUM LACTATE, POTASSIUM CHLORIDE, AND CALCIUM CHLORIDE 1000 ML: .6; .31; .03; .02 INJECTION, SOLUTION INTRAVENOUS at 14:58

## 2021-06-03 NOTE — L&D DELIVERY NOTE
Vaginal Delivery Summary - OB/GYN   Missy Epps 30 y o  female MRN: [id number]  Unit/Bed#: -01 Encounter: [id number]    Pre-delivery Diagnosis:   Pregnancy at 36 weeks 6 days  History of delivery at 23 weeks of IUFD  History of delivery at 35 weeks  Anxiety  Tobacco smoking   labor  Carrier beta thalassemia minor    Post-delivery Diagnosis: same, delivered    Procedure: Spontaneous Vaginal Delivery     Attending Physician: Dr Chandler Rowley  Resident Physician: Dr Oanh Hanson, Dr Francesco Higuera  Other Assistant: Dr Susy Carmen [facility] intern)    Anesthesia: Epidural    EBL: 300 cc, QBL pending at time of dictation    Complications: none apparent    Specimens:   1  Arterial and venous cord gases  2  Cord blood  3  Segment of umbilical cord  4  Placenta to storage     Findings:  1  Viable male on 6/3/2021 at 0905, with APGARS of 9 and 9 at 1 and 5 minutes respectively,  2  Spontaneous delivery of intact placenta at 0917  3  No lacerations    Gases:  Umbilical Cord Venous Blood Gas:  Results from last 7 days   Lab Units 21  0909   PH COV  7 339   PCO2 COV mm HG 42 3   HCO3 COV mmol/L 22 3   BASE EXC COV mmol/L -3 4*   O2 CT CD VB mL/dL 15 2   O2 HGB, VENOUS CORD % 05 7     Umbilical Cord Arterial Blood Gas:  Results from last 7 days   Lab Units 21  0909   PH COA  7 354   PCO2 COA  41 6   PO2 COA mm HG 30 1*   HCO3 COA mmol/L 22 7   BASE EXC COA mmol/L -2 7*   O2 CONTENT CORD ART ml/dl 14 4   O2 HGB, ARTERIAL CORD % 72 4       Brief history and labor course: Missy Epps is a 30 y o  [id number] 36w6d   She presented to labor and delivery for  labor, and was noted to be 6/80/-2 in triage  Her pregnancy was complicated by the above problem us, she was on [place]   She was admitted for  labor  Amniotomy was performed for clear fluid at 8 cm dilated  She progressed to be complete and started pushing      Description of delivery:    After pushing for 7 minutes, she delivered a viable male  "Brien", wt pending as mother is doing skin to skin bonding  The fetal vertex delivered direct OA position spontaneously  There was an easily reduced nuchal cord x1  The left anterior shoulder delivered atraumatically with maternal expulsive forces and the assistance of gentle downward traction  The right posterior shoulder delivered with maternal expulsive forces and the assistance of gentle upward traction  The remainder of the fetus delivered spontaneously  Upon delivery, the infant was placed on the mothers abdomen and the cord was doubly clamped and cut  Delayed cord clamping was achieved  The infant was noted to cry spontaneously and was moving all extremities appropriately  There was no evidence for injury  Awaiting nurse resuscitators evaluated the   Arterial and venous cord blood gases and cord blood was collected for analysis  These were promptly sent to the lab  In the immediate post-partum, active management of the 3rd stage of labor was performed with massage, the administration of 30 units of IV pitocin, and gentle traction on the umbilical cord  The placenta delivered spontaneously and was noted to have a centrally inserted 3 vessel cord  The placenta was sent to storage     Laceration Repair  The vagina, cervix, perineum, and rectum were inspected and there was noted to be no lacerations  At the conclusion of the procedure, all needle, sponge, and instrument counts were noted to be correct  Dr Jeramy Garcia was present and participated in all key portions of the case  Disposition:  The patient and the  both tolerated the procedure well and are recovering in labor and delivery room       Nancy Vásquez 89, DO  OB/GYN PGY-1  6/3/2021  9:25 AM

## 2021-06-03 NOTE — DISCHARGE INSTRUCTIONS
Self Care After Delivery   AMBULATORY CARE:   The postpartum period  is the period of time from delivery to about 6 weeks  During this time you may experience many physical and emotional changes  It is important to understand what is normal and when you need to call your healthcare provider  It is also important to know how to care for yourself during this time  Call your local emergency number (911 in the [address] [facility]) for any of the following:   · You see or hear things that are not there, or have thoughts of harming yourself or your baby  · You soak through 1 pad in 15 minutes, have blurry vision, clammy or pale skin, and feel faint  · You faint or lose consciousness  · You have trouble breathing  · You cough up blood  · Your  incision comes apart  Seek care immediately if:   · Your heart is beating faster than usual     · You have a bad headache or changes in your vision  · Your episiotomy or  incision is red, swollen, bleeding, or draining pus  · You have severe abdominal pain  Call your doctor or obstetrician if:   · Your leg is painful, red, and larger than usual     · You soak through 1 or more pads in an hour, or pass blood clots larger than a quarter from your vagina  · You have a fever  · You have new or worsening pain in your abdomen or vagina  · You continue to have depression 1 to 2 weeks after you deliver  · You have trouble sleeping  · You have foul-smelling discharge from your vagina  · You have pain or burning when you urinate  · You do not have a bowel movement for 3 days or more  · You have nausea or are vomiting  · You have hard lumps or red streaks over your breasts  · You have cracked nipples or bleed from your nipples  · You have questions or concerns about your condition or care  Physical changes:   The following are normal changes after you give birth:  · Pain in the area between your anus and vagina    · Breast pain    · Constipation or hemorrhoids    · Hot or cold flashes    · Vaginal bleeding or discharge    · Mild to moderate abdominal cramping    · Difficulty controlling bowel movements or urine    Emotional changes:  A drop in hormone levels after you deliver may cause changes in your emotions  You may feel irritable, sad, or anxious  You may cry easily or for no reason  You may also feel depressed  Depression that continues can be a sign of postpartum depression, a condition that can be treated  Treatment may include talk therapy, medicines, or both  Healthcare providers will ask how you are feeling and if you have any depression  These talks can happen during appointments for your medical care and for your baby's care, such as well child visits  Providers can help you find ways to care for yourself and your baby  Talk to your providers about the following:  · When emotional changes or depression started, and if it is getting worse over time    · Problems you are having with daily activities, sleep, or caring for your baby    · If anything makes you feel worse, or makes you feel better    · Feeling that you are not bonding with your baby the way you want    · Any problems your baby has with sleeping or feeding    · Your baby is fussy or cries a lot    · Support you have from friends, family, or others    Breast care for breastfeeding mothers: You may have sore breasts for 3 to 6 days after you give birth  This happens as your milk begins to fill your breasts  You may also have sore breasts if you do not breastfeed frequently  Do the following to care for your breasts:  · Apply a moist, warm, compress to your breast as directed  This may help soothe your breasts  Make sure the washcloth is not too hot before you apply it to your breast     · Nurse your baby or pump your milk frequently  This may prevent clogged milk ducts  Ask your healthcare provider how often to nurse or pump      · Massage your breasts as directed  This may help increase your milk flow  Gently rub your breasts in a circular motion before you breastfeed  You may need to gently squeeze your breast or nipple to help release milk  You can also use a breast pump to help release milk from your breast     · Wash your breasts with warm water only  Do not put soap on your nipples  Soap may cause your nipples to become dry  · Apply lanolin cream to your nipples as directed  Lanolin cream may add moisture to your skin and prevent nipple dryness  Always  wash off lanolin cream with warm water before you breastfeed  · Place pads in your bra  Your nipples may leak milk when you are not breastfeeding  You can place pads inside of your bra to help prevent leaking onto your clothing  Ask your healthcare provider where to purchase bra pads  · Get breastfeeding support if needed  Healthcare providers can answer questions about breastfeeding and provide you with support  Ask your healthcare provider who you can contact if you need breastfeeding support  Breast care for non-breastfeeding mothers:  Milk will fill your breasts even if you bottle feed your baby  Do the following to help stop your milk from filling your breasts and causing pain:  · Wear a bra with support at all times  A sports bra or a tight-fitting bra will help stop your milk from coming in  · Apply ice on each breast for 15 to 20 minutes every hour or as directed  Use an ice pack, or put crushed ice in a plastic bag  Cover it with a towel before you apply it to your breast  Ice helps your milk ducts shrink  · Keep your breasts away from warm water  Warm water will make it easier for milk to fill your breasts  Stand with your breasts away from warm water in the shower  · Limit how much you touch your breasts  This will prevent them from filling with milk  Perineum care: Your perineum is the area between your rectum and vagina   It is normal to have swelling and pain in this area after you give birth  If you had an episiotomy, your healthcare provider may give you special instructions  · Clean your perineum after you use the bathroom  This may prevent infection and help with healing  Use a spray bottle with warm water to clean your perineum  You may also gently spray warm water against your perineum when you urinate  Always wipe front to back  · Take a sitz bath as directed  A sitz bath may help relieve swelling and pain  Fill your bath tub or bucket with water up to your hips and sit in the water  Use cold water for 2 days after you deliver  Then use warm water  Ask your healthcare provider for more information about a sitz bath  · Apply ice packs for the first 24 hours or as directed  Use a plastic glove filled with ice or buy an ice pack  Wrap the ice pack or plastic glove in a small towel or wash cloth  Place the ice pack on your perineum for 20 minutes at a time  · Sit on a donut-shaped pillow  This may relieve pressure on your perineum when you sit  · Use wipes that contain medicine or take pills as directed  Your healthcare provider may tell you to use witch hazel pads  You can place witch hazel pads in the refrigerator before you apply them to your perineum  Your provider may also tell you to take NSAIDs  Ask him or her how often to take pills or use the wipes  · Do not go swimming or take tub baths for 4 to 6 weeks or as directed  This will help prevent an infection in your vagina or uterus  Bowel and bladder care: It may take 3 to 5 days to have a bowel movement after you deliver your baby  You can do the following to prevent or manage constipation, and get control of your bowel or bladder:  · Take stool softeners as directed  A stool softener is medicine that will make your bowel movements softer  This may prevent or relieve constipation  A stool softener may also make bowel movements less painful  · Drink plenty of liquids    Ask how much liquid to drink each day and which liquids are best for you  Liquids may help prevent constipation  · Eat foods high in fiber  Examples include fruits, vegetables, grains, beans, and lentils  Ask your healthcare provider how much fiber you need each day  Fiber may prevent constipation  · Do Kegel exercises as directed  Kegel exercises will help strengthen the muscles that control bowel movements and urination  Ask your healthcare provider for more information on Kegel exercises  · Apply cold compresses or medicine to hemorrhoids as directed  This may relieve swelling and pain  Your healthcare provider may tell you to apply ice or wipes that contain medicine to your hemorrhoids  He or she may also tell you to use a sitz bath  Ask your provider for more information on how to manage hemorrhoids  Nutrition:  Good nutrition is important in the postpartum period  It will help you return to a healthy weight, increase your energy levels, and prevent constipation  It will also help you get enough nutrients and calories if you are going to breastfeed your baby  · Eat a variety of healthy foods  Healthy foods include fruits, vegetables, whole-grain breads, low-fat dairy products, beans, lean meats, and fish  You may need 500 to 700 extra calories each day if you breastfeed your baby  You may also need extra protein  · Limit foods with added sugar and high amounts of fat  These foods are high in calories and low in healthy nutrients  Read food labels so you know how much sugar and fat is in the food you want to eat  · Drink 8 to 10 glasses of water per day  Water will help you make plenty of milk for your baby  It will also help prevent constipation  Drink a glass of water every time you breastfeed your baby  · Take vitamins as directed  Ask your healthcare provider what vitamins you need  · Limit caffeine and alcohol if you are breastfeeding    Caffeine and alcohol can get into your breast milk  Caffeine and alcohol can make your baby fussy  They can also interfere with your baby's sleep  Ask your healthcare provider if you can drink alcohol or caffeine  Rest and sleep: You may feel very tired in the postpartum period  Enough sleep will help you heal and give you energy to care for your baby  The following may help you get sleep and rest:  · Nap when your baby naps  Your baby may nap several times during the day  Get rest during this time  · Limit visitors  Many people may want to see you and your baby  Ask friends or family to visit on different days  This will give you time to rest     · Do not plan too much for one day  Put off household chores so that you have time to rest  Gradually do more each day  · Ask for help from family, friends, or neighbors  Ask them to help you with laundry, cleaning, or errands  Also ask someone to watch the baby while you take a nap or relax  Ask your partner to help with the care of your baby  Pump some of your breast milk so your partner can feed your baby during the night  Exercise after delivery:  Wait until your healthcare provider says it is okay to exercise  Exercise can help you lose weight, increase your energy levels, and manage your mood  It can also prevent constipation and blood clots  Start with gentle exercises such as walking  Do more as you have more energy  You may need to avoid abdominal exercises for 1 to 2 weeks after you deliver  Talk to your healthcare provider about an exercise plan that is right for you  Sexual activity after delivery:   · Do not have sex until your healthcare provider says it is okay  You may need to wait 4 to 6 weeks before you have sex  This may prevent infection and allow time to heal     · Your menstrual cycle may begin as soon as 3 weeks after you deliver  Your period may be delayed if you breastfeed your baby  You can become pregnant before you get your first postpartum period   Talk to your healthcare provider about birth control that is right for you  Some types of birth control are not safe during breastfeeding  For support and more information:  Join a support group for new mothers  Ask for help from family and friends with chores, errands, and care of your baby  · Office of Women's Health,  Department of Health and Human Services  [address], [address] [facility]  [place] [address]  [address], [address] [facility]  [place] [address]  Phone: 1- 549 - [phone]  Web Address: www womenshealth gov  · March of [facility] Postpartum [address] , [address]  [address] [facility] , [address] [facility]  Web Address: [employer]  [facility]/pregnancy/postpartum-care  aspx  Follow up with your doctor or obstetrician as directed: You will need to follow up within 2 to 6 weeks of delivery  Write down your questions so you remember to ask them at your visits  © Copyright [address] Information is for End User's use only and may not be sold, redistributed or otherwise used for commercial purposes  All illustrations and images included in CareNotes® are the copyrighted property of [employer]  or [facility] Ubaldo Prado   The above information is an  only  It is not intended as medical advice for individual conditions or treatments  Talk to your doctor, nurse or pharmacist before following any medical regimen to see if it is safe and effective for you

## 2021-06-03 NOTE — ANESTHESIA PREPROCEDURE EVALUATION
Procedure:  LABOR ANALGESIA    Relevant Problems   GYN   (+) 36 weeks gestation of pregnancy   (+) History of premature delivery, currently pregnant, second trimester      NEURO/PSYCH   (+) Anxiety and depression   (+) Previous  delivery, antepartum        Physical Exam    Airway    Mallampati score: II  TM Distance: >3 FB  Neck ROM: full     Dental       Cardiovascular  Cardiovascular exam normal    Pulmonary  Pulmonary exam normal     Other Findings        Anesthesia Plan  ASA Score- 2     Anesthesia Type- epidural with ASA Monitors  Additional Monitors:   Airway Plan:           Plan Factors-Exercise tolerance (METS): >4 METS  Chart reviewed  Patient is not a current smoker  Induction-     Postoperative Plan-     Informed Consent- Anesthetic plan and risks discussed with patient  I personally reviewed this patient with the CRNA  Discussed and agreed on the Anesthesia Plan with the CRNA  Lai Rajan

## 2021-06-03 NOTE — ANESTHESIA PROCEDURE NOTES
Epidural Block    Patient location during procedure: OB  Start time: 6/3/2021 6:17 AM  Reason for block: procedure for pain and at surgeon's request  Staffing  Anesthesiologist: Edie Allred MD  Performed: anesthesiologist   Preanesthetic Checklist  Completed: patient identified, site marked, surgical consent, pre-op evaluation, timeout performed, IV checked, risks and benefits discussed and monitors and equipment checked  Epidural  Patient position: sitting  Prep: ChloraPrep  Patient monitoring: cardiac monitor and frequent blood pressure checks  Approach: midline  Location: lumbar (1-5)  Injection technique: CECE air  Needle  Needle type: Tuohy   Epidural needle gauge: 17   Catheter type: side hole  Catheter size: 19    Test dose: negativelidocaine 1 5% with epinephrine 1:200,000 test dose, 3 mL  lidocaine (PF) (XYLOCAINE-MPF) 1 % infiltration, 5 mLnegative aspiration for CSF, negative aspiration for heme and no paresthesia on injection  patient tolerated the procedure well with no immediate complications

## 2021-06-03 NOTE — PROGRESS NOTES
Called to bedside due to bleeding  Nurse reported expressing 1cc of blood clot and patient still with some light bleeding  Bleeding noted on bren, to be weighed  Uterus noted to be firm at U  Pt was already receiving 8hr bag of pitocin for PPH PPX  A bimanual exam was performed and uterus noted to be firm and unable to express any clots  No bleeding noticed upon withdrawal of examining hand  0 2mg IM methergine ordered  Repeat vital signs, CBC, and coags ordered  Will give 1L IVF  Pt otherwise stable  Continue to monitor       Admission Hgb 11 7  S/p , QBL 159cc    Pt evaluated with Dr Ulises Kim MD  OBGYN, PGY-4  6/3/2021 2:27 PM

## 2021-06-03 NOTE — H&P
[address] [facility] 30 y o  female MRN: [id number]  Unit/Bed#: -01 Encounter: [id number]      Assessment: 30 y o  [id number] at 36w6d admitted for labor   SVE: -2  FHT: 130s, reactive  Clinical EFW: 7 ; Vertex confirmed by US  GBS status: negative     Plan:   · Admit  · CBC, RPR, Type & Screen  · Analgesia at maternal request  · Expectant management    Dr Romaine Gooden aware      SUBJECTIVE:    Chief Complaint: contractions    HPI: Kannan Jones is a 30 y o  [id number] with an JANES of 2021, by Ultrasound at 36w6d who is being admitted for labor   She complains of uterine contractions, occurring every two minutes, has no LOF, and reports no VB  She states she has felt good FM  Mandie Schultz     Pregnancy complications: Hx 23 week demise, hx 35 week delivery, s/p [place], tobacco use          Baby complications/comments: none    Patient Active Problem List   Diagnosis    Previous  delivery, antepartum    Anxiety and depression    History of fetal anomaly in prior pregnancy, currently pregnant    Anxiety during pregnancy, antepartum    Carrier of beta thalassemia    History of premature delivery, currently pregnant, second trimester    Tobacco smoking affecting pregnancy in third trimester    36 weeks gestation of pregnancy       OB History    Para Term  AB Living   7 3 1 2 3 2   SAB TAB Ectopic Multiple Live Births   3 0 0   3      # Outcome Date GA Lbr Turner/2nd Weight Sex Delivery Anes PTL Lv   7 Current            6  20 23w3d  563 g (1 lb 3 9 oz) M Vag-Spont EPI  ND   5 SAB 19 9w5d             Birth Comments: D&C   4  19 35w0d  2722 g (6 lb) F Vag-Spont EPI  DONNY   3 SAB 2017 6w0d          2 SAB 2015 8w0d          1 Term 12 40w0d  3799 g (8 lb 6 oz) M Vag-Spont EPI N DONNY      Obstetric Comments   Age at menarche: 10   Age at first child: 23   Cramps: Yes   Coitarche: 13          Past Medical History:   Diagnosis Date    Anemia     on iron    Anxiety and depression 3/17/2020    Arthritis     Right leg    Eating disorder     Migraines     Frequent migraines, seems to affect the left side of her body   Papanicolaou smear for cervical cancer screening 12/27/2019    Post partum depression     Psychiatric disorder     ptsd    Seasonal allergies     Varicella     as a child        Past Surgical History:   Procedure Laterality Date    DILATION AND CURETTAGE OF UTERUS         Social History     Tobacco Use    Smoking status: Current Some Day Smoker     Packs/day: 0 25     Years: 5 00     Pack years: 1 25     Types: Cigarettes    Smokeless tobacco: Never Used    Tobacco comment: 2 daily   Substance Use Topics    Alcohol use: Never     Frequency: Never     Binge frequency: Never       Allergies   Allergen Reactions    Other Wheezing     shellfish    Shellfish Allergy - Food Allergy Anxiety, Itching and Swelling       Facility-Administered Medications Prior to Admission   Medication    hydroxyprogesterone caproate (BECK) injection 250 mg    HYDROXYprogesterone Caproate (BECK) injection 275 mg     Medications Prior to Admission   Medication    acetaminophen (TYLENOL) 500 mg tablet    doxylamine (UNISON) 25 MG tablet    ferrous sulfate 325 (65 Fe) mg tablet    Prenatal Vit-Fe Fumarate-FA (prenatal multivitamin) 28-0 8 MG TABS           OBJECTIVE:  Vitals:  Temp:  [98 4 °F (36 9 °C)] 98 4 °F (36 9 °C)  HR:  [82] 82  Resp:  [18] 18  BP: (120)/(67) 120/67  There is no height or weight on file to calculate BMI  Physical Exam:  Physical Exam  Constitutional:       Appearance: Normal appearance  HENT:      Head: Normocephalic and atraumatic  Eyes:      Extraocular Movements: Extraocular movements intact  Cardiovascular:      Rate and Rhythm: Normal rate and regular rhythm  Pulmonary:      Effort: Pulmonary effort is normal       Breath sounds: Normal breath sounds     Abdominal:      Comments: Gravid   Musculoskeletal: Normal range of motion  Neurological:      Mental Status: She is alert and oriented to person, place, and time  Skin:     General: Skin is warm and dry     Psychiatric:         Mood and Affect: Mood normal          Behavior: Behavior normal           Lab Results   Component Value Date    WBC 6 54 03/23/2021    HGB 11 9 03/23/2021    HCT 37 4 03/23/2021     03/23/2021     Lab Results   Component Value Date    K 4 3 01/27/2020     01/27/2020    CO2 26 01/27/2020    BUN 9 01/27/2020    CREATININE 0 75 01/27/2020    AST 22 01/27/2020    ALT 26 01/27/2020       Prenatal Labs   Blood type: O+  Antibody: negative  Group B strep: negative  HIV: negative  Hepatitis B: negative  RPR: non-reactive  Rubella: Immune  Varicella: Unknown  1 hour Glucose: 86    >2 Midnights  INPATIENT       Alma Delia Javier MD  PGY-1 OB/GYN   6/3/2021 5:33 AM

## 2021-06-03 NOTE — OB LABOR/OXYTOCIN SAFETY PROGRESS
Oxytocin Safety Progress Check Note - Aneta Cui 30 y o  female MRN: [id number]    Unit/Bed#: -01 Encounter: [id number]       Contraction Frequency (minutes): 6-7(with irritability)  Contraction Quality: Moderate, Strong  Tachysystole: No   Cervical Dilation: Lip/rim (Comment)        Cervical Effacement: 90  Fetal Station: 0  Baseline Rate: 125 bpm  Fetal Heart Rate: 132 BPM  FHR Category: Category I               Vital Signs:   Vitals:    06/03/21 0816   BP: 118/55   Pulse: 72   Resp: 16   Temp: 98 °F (36 7 °C)   SpO2:            Notes/comments:    Cervical exam as above, starting to feel constant rectal pressure, anterior lip, non reducible with push  Placed patient on the peanut ball on her left side      Rhonda Baker DO 6/3/2021 8:31 AM

## 2021-06-03 NOTE — OB LABOR/OXYTOCIN SAFETY PROGRESS
Labor Progress Note - Naresh Jacques 30 y o  female MRN: [id number]    Unit/Bed#: -01 Encounter: [id number]       Contraction Frequency (minutes): 6-7(with irritability)  Contraction Quality: Moderate, Strong  Tachysystole: No   Cervical Dilation: 8        Cervical Effacement: 90  Fetal Station: 0  Baseline Rate: 125 bpm  Fetal Heart Rate: 132 BPM  FHR Category: Category I               Vital Signs:   Vitals:    06/03/21 0732   BP: 117/62   Pulse: 77   Resp:    Temp:    SpO2:            Notes/comments:    Cervical exam as above, bulging bag was dilating cervix to 9 cm amniotomy was performed for clear fluid, and cervix shrunk back to 8 cm   Anticipate vaginal delivery    Nieves Fortune DO 6/3/2021 7:50 AM

## 2021-06-03 NOTE — ANESTHESIA POSTPROCEDURE EVALUATION
Post-Op Assessment Note    CV Status:  Stable    Pain management: adequate     Mental Status:  Alert and awake   Hydration Status:  Stable   PONV Controlled:  None   Airway Patency:  Patent      Post Op Vitals Reviewed: Yes      Staff: CRNA     Post-op block assessment: catheter intact and no complications      No complications documented      /58 (06/03/21 1030)    Temp      Pulse 73 (06/03/21 1030)   Resp      SpO2

## 2021-06-03 NOTE — PLAN OF CARE
Problem: Knowledge Deficit  Goal: Verbalizes understanding of labor plan  Description: Assess patient/family/caregiver's baseline knowledge level and ability to understand information  Provide education via patient/family/caregiver's preferred learning method at appropriate level of understanding  1  Provide teaching at level of understanding  2  Provide teaching via preferred learning method(s)  Outcome: Progressing  Goal: Patient/family/caregiver demonstrates understanding of disease process, treatment plan, medications, and discharge instructions  Description: Complete learning assessment and assess knowledge base  Interventions:  - Provide teaching at level of understanding  - Provide teaching via preferred learning methods  Outcome: Progressing     Problem: Labor & Delivery  Goal: Manages discomfort  Description: Assess and monitor for signs and symptoms of discomfort  Assess patient's pain level regularly and per hospital policy  Administer medications as ordered  Support use of nonpharmacological methods to help control pain such as distraction, imagery, relaxation, and application of heat and cold  Collaborate with interdisciplinary team and patient to determine appropriate pain management plan  1  Include patient in decisions related to comfort  2  Offer non-pharmacological pain management interventions  3  Report ineffective pain management to physician  Outcome: Progressing  Goal: Patient vital signs are stable  Description: 1  Assess vital signs - vaginal delivery    Outcome: Progressing     Problem: PAIN - ADULT  Goal: Verbalizes/displays adequate comfort level or baseline comfort level  Description: Interventions:  - Encourage patient to monitor pain and request assistance  - Assess pain using appropriate pain scale  - Administer analgesics based on type and severity of pain and evaluate response  - Implement non-pharmacological measures as appropriate and evaluate response  - Consider cultural and social influences on pain and pain management  - Notify physician/advanced practitioner if interventions unsuccessful or patient reports new pain  Outcome: Progressing     Problem: INFECTION - ADULT  Goal: Absence or prevention of progression during hospitalization  Description: INTERVENTIONS:  - Assess and monitor for signs and symptoms of infection  - Monitor lab/diagnostic results  - Monitor all insertion sites, i e  indwelling lines, tubes, and drains  - Monitor endotracheal if appropriate and nasal secretions for changes in amount and color  - [place] appropriate cooling/warming therapies per order  - Administer medications as ordered  - Instruct and encourage patient and family to use good hand hygiene technique  - Identify and instruct in appropriate isolation precautions for identified infection/condition  Outcome: Progressing  Goal: Absence of fever/infection during neutropenic period  Description: INTERVENTIONS:  - Monitor WBC    Outcome: Progressing     Problem: SAFETY ADULT  Goal: Patient will remain free of falls  Description: INTERVENTIONS:  - Assess patient frequently for physical needs  -  Identify cognitive and physical deficits and behaviors that affect risk of falls    -  [place] fall precautions as indicated by assessment   - Educate patient/family on patient safety including physical limitations  - Instruct patient to call for assistance with activity based on assessment  - Modify environment to reduce risk of injury  - Consider OT/PT consult to assist with strengthening/mobility  Outcome: Progressing  Goal: Maintain or return to baseline ADL function  Description: INTERVENTIONS:  -  Assess patient's ability to carry out ADLs; assess patient's baseline for ADL function and identify physical deficits which impact ability to perform ADLs (bathing, care of mouth/teeth, toileting, grooming, dressing, etc )  - Assess/evaluate cause of self-care deficits   - Assess range of motion  - Assess patient's mobility; develop plan if impaired  - Assess patient's need for assistive devices and provide as appropriate  - Encourage maximum independence but intervene and supervise when necessary  - Involve family in performance of ADLs  - Assess for home care needs following discharge   - Consider OT consult to assist with ADL evaluation and planning for discharge  - Provide patient education as appropriate  Outcome: Progressing  Goal: Maintain or return mobility status to optimal level  Description: INTERVENTIONS:  - Assess patient's baseline mobility status (ambulation, transfers, stairs, etc )    - Identify cognitive and physical deficits and behaviors that affect mobility  - Identify mobility aids required to assist with transfers and/or ambulation (gait belt, sit-to-stand, lift, walker, cane, etc )  - [place] fall precautions as indicated by assessment  - Record patient progress and toleration of activity level on Mobility SBAR; progress patient to next Phase/Stage  - Instruct patient to call for assistance with activity based on assessment  - Consider rehabilitation consult to assist with strengthening/weightbearing, etc   Outcome: Progressing     Problem: DISCHARGE PLANNING  Goal: Discharge to home or other facility with appropriate resources  Description: INTERVENTIONS:  - Identify barriers to discharge w/patient and caregiver  - Arrange for needed discharge resources and transportation as appropriate  - Identify discharge learning needs (meds, wound care, etc )  - Arrange for interpretive services to assist at discharge as needed  - Refer to Case Management Department for coordinating discharge planning if the patient needs post-hospital services based on physician/advanced practitioner order or complex needs related to functional status, cognitive ability, or social support system  Outcome: Progressing

## 2021-06-03 NOTE — PROGRESS NOTES
Called back to room for more bleeding  QBL per nursing, which included my last assessment is 808cc  Pt had already received IM methergine x1 at 1422  Vital signs stable  Nursing at bedside attempting to replace IV and collect labs previously ordered  Initially patient was declining a bimanual exam stating if I touched her, she would kick me  I offerred patient IV pain medication vs going to the OR for examination  Pt ultimately was able to become more calm and stated that she was okay with me performing the exam and also declined pain medication  I subsequently performed a sterile bimanual exam that was notable for a very firm, contracted down uterus at the umbilicus  I was able to insert 2 fingers in the uterine cavity to the fundus and sweep out some clots  Pt required a couple breaks in between due to the pain, but ultimately I was able to complete the exam  Pt was given a dose of 0 25mg Hemabate IM  Nursing and patient aware that she may still pass some small clots as they were not completely evacuated from fundus, but otherwise her uterus is firm and andre down  Will f/u final QBL and labs  Continue to monitor  Advised patient to report any signs/symptoms of ABLA, which were reviewed with her  Advised assistance when getting out of bed to prevent falls       Vitals:    06/03/21 1420   BP: 124/59   Pulse: 73   Resp: 18   Temp: 99 4 °F (37 4 °C)   SpO2:          Pt was seen with Dr Elieser Servin MD  OBGYN, PGY-4  6/3/2021 2:57 PM

## 2021-06-03 NOTE — PLAN OF CARE
Problem: Knowledge Deficit  Goal: Verbalizes understanding of labor plan  Description: Assess patient/family/caregiver's baseline knowledge level and ability to understand information  Provide education via patient/family/caregiver's preferred learning method at appropriate level of understanding  1  Provide teaching at level of understanding  2  Provide teaching via preferred learning method(s)  Outcome: Progressing  Goal: Patient/family/caregiver demonstrates understanding of disease process, treatment plan, medications, and discharge instructions  Description: Complete learning assessment and assess knowledge base  Interventions:  - Provide teaching at level of understanding  - Provide teaching via preferred learning methods  Outcome: Progressing     Problem: Labor & Delivery  Goal: Manages discomfort  Description: Assess and monitor for signs and symptoms of discomfort  Assess patient's pain level regularly and per hospital policy  Administer medications as ordered  Support use of nonpharmacological methods to help control pain such as distraction, imagery, relaxation, and application of heat and cold  Collaborate with interdisciplinary team and patient to determine appropriate pain management plan  1  Include patient in decisions related to comfort  2  Offer non-pharmacological pain management interventions  3  Report ineffective pain management to physician  Outcome: Completed  Goal: Patient vital signs are stable  Description: 1  Assess vital signs - vaginal delivery    Outcome: Completed     Problem: PAIN - ADULT  Goal: Verbalizes/displays adequate comfort level or baseline comfort level  Description: Interventions:  - Encourage patient to monitor pain and request assistance  - Assess pain using appropriate pain scale  - Administer analgesics based on type and severity of pain and evaluate response  - Implement non-pharmacological measures as appropriate and evaluate response  - Consider cultural and social influences on pain and pain management  - Notify physician/advanced practitioner if interventions unsuccessful or patient reports new pain  Outcome: Progressing     Problem: INFECTION - ADULT  Goal: Absence or prevention of progression during hospitalization  Description: INTERVENTIONS:  - Assess and monitor for signs and symptoms of infection  - Monitor lab/diagnostic results  - Monitor all insertion sites, i e  indwelling lines, tubes, and drains  - Monitor endotracheal if appropriate and nasal secretions for changes in amount and color  - [place] appropriate cooling/warming therapies per order  - Administer medications as ordered  - Instruct and encourage patient and family to use good hand hygiene technique  - Identify and instruct in appropriate isolation precautions for identified infection/condition  Outcome: Progressing  Goal: Absence of fever/infection during neutropenic period  Description: INTERVENTIONS:  - Monitor WBC    Outcome: Progressing     Problem: SAFETY ADULT  Goal: Patient will remain free of falls  Description: INTERVENTIONS:  - Assess patient frequently for physical needs  -  Identify cognitive and physical deficits and behaviors that affect risk of falls    -  [place] fall precautions as indicated by assessment   - Educate patient/family on patient safety including physical limitations  - Instruct patient to call for assistance with activity based on assessment  - Modify environment to reduce risk of injury  - Consider OT/PT consult to assist with strengthening/mobility  Outcome: Progressing  Goal: Maintain or return to baseline ADL function  Description: INTERVENTIONS:  -  Assess patient's ability to carry out ADLs; assess patient's baseline for ADL function and identify physical deficits which impact ability to perform ADLs (bathing, care of mouth/teeth, toileting, grooming, dressing, etc )  - Assess/evaluate cause of self-care deficits   - Assess range of motion  - Assess patient's mobility; develop plan if impaired  - Assess patient's need for assistive devices and provide as appropriate  - Encourage maximum independence but intervene and supervise when necessary  - Involve family in performance of ADLs  - Assess for home care needs following discharge   - Consider OT consult to assist with ADL evaluation and planning for discharge  - Provide patient education as appropriate  Outcome: Progressing  Goal: Maintain or return mobility status to optimal level  Description: INTERVENTIONS:  - Assess patient's baseline mobility status (ambulation, transfers, stairs, etc )    - Identify cognitive and physical deficits and behaviors that affect mobility  - Identify mobility aids required to assist with transfers and/or ambulation (gait belt, sit-to-stand, lift, walker, cane, etc )  - [place] fall precautions as indicated by assessment  - Record patient progress and toleration of activity level on Mobility SBAR; progress patient to next Phase/Stage  - Instruct patient to call for assistance with activity based on assessment  - Consider rehabilitation consult to assist with strengthening/weightbearing, etc   Outcome: Progressing

## 2021-06-03 NOTE — LACTATION NOTE
Selena Desir changed her mind and now does not want to breast feed  She will formula feed only  Status changed

## 2021-06-03 NOTE — DISCHARGE SUMMARY
Discharge Summary - Jose Alejandro Pinto 30 y o  female MRN: [id number]    Unit/Bed#: -01 Encounter: [id number]    Admission Date: 6/3/2021     Discharge Date: 21    Admitting Diagnosis:   Patient Active Problem List   Diagnosis    Previous  delivery, antepartum    Anxiety and depression    History of fetal anomaly in prior pregnancy, currently pregnant    Anxiety during pregnancy, antepartum    Carrier of beta thalassemia    History of premature delivery, currently pregnant, second trimester    Tobacco smoking affecting pregnancy in third trimester    36 weeks gestation of pregnancy     labor in third trimester     (spontaneous vaginal delivery)       Discharge Diagnosis:   Same, delivered    Procedures:   spontaneous vaginal delivery    Admitting Attending: Dr Janina Curry  Delivery Attending: Dr Lanette Ricci  Discharge Attending: Dr Prado Res: Jose Alejandro Pinto is a 30 y o  [id number] who was admitted at 36w6d for  labor, and was noted to be 6/80/-2 in triage  Her pregnancy was complicated by above problems mentioned  Amniotomy was performed for clear fluid at 8 cm dilated  She progressed to be complete and started pushing  She delivered a viable male  on 21 at 09:05  Weight 6lbs 11oz via spontaneous vaginal delivery  Apgars were 9 (1 min) and 9 (5 min)   was transferred to  nursery  Patient tolerated the procedure well and was transferred to recovery in stable condition  Her post-partum course was uncomplicated by a postpartum hemorrhage that was controlled with hemabate, methergine, and pitocin  Her post-partum pain was well controlled with oral analgesics  On day of discharge, she was ambulating and able to reasonably perform all ADLs  She was voiding and had appropriate bowel function  Pain was well controlled  She was discharged home on postpartum day #2 without complications   Patient was instructed to follow up with her OB as an outpatient and was given appropriate warnings to call doctor or provider if she develops signs of infection or uncontrolled pain  On day of discharge she was ambulating, voiding spontaneously, tolerating oral intake and hemodynamically stable  Disposition: Home    Planned Readmission: No    Discharge Medications:   Prenatal vitamin daily for 6 months or the duration of nursing, whichever is longer  Motrin 600 mg orally every 6 hours as needed for pain  Tylenol (over the counter) per bottle directions as needed for pain  Hydrocortisone cream 1% (over the counter) applied 1-2x daily to perineum as needed  Witch hazel pads for perineal discomfort as needed      Discharge instructions :   -Do not place anything (no partner, tampons or douche) in your vagina for 6 weeks  -You may walk for exercise for the first 6 weeks then gradually return to your usual activities    -Please do not drive for 1 week if you have no stitches and for 2 weeks if you have stitches or underwent a  delivery     -You may take baths or shower per your preference    -Please look at your bust (breasts) in the mirror daily and call your doctor for redness or tenderness or increased warmth  - If you have had a  section please look at your incision daily as well and call provider for increasing redness or steady drainage from the incision    -Please call your doctor's office if temperature > 100 4*F or 38* C, worsening pain or a foul discharge      Follow Up:  - Follow up in 6 weeks for postpartum visit

## 2021-06-04 PROCEDURE — 99024 POSTOP FOLLOW-UP VISIT: CPT | Performed by: OBSTETRICS & GYNECOLOGY

## 2021-06-04 RX ADMIN — DOCUSATE SODIUM 100 MG: 100 CAPSULE, LIQUID FILLED ORAL at 08:17

## 2021-06-04 RX ADMIN — IBUPROFEN 600 MG: 600 TABLET, FILM COATED ORAL at 21:49

## 2021-06-04 RX ADMIN — DOCUSATE SODIUM 100 MG: 100 CAPSULE, LIQUID FILLED ORAL at 17:37

## 2021-06-04 RX ADMIN — ACETAMINOPHEN 650 MG: 325 TABLET, FILM COATED ORAL at 17:43

## 2021-06-04 RX ADMIN — IBUPROFEN 600 MG: 600 TABLET, FILM COATED ORAL at 01:35

## 2021-06-04 RX ADMIN — Medication 1 TABLET: at 08:17

## 2021-06-04 RX ADMIN — IBUPROFEN 600 MG: 600 TABLET, FILM COATED ORAL at 14:36

## 2021-06-04 RX ADMIN — IBUPROFEN 600 MG: 600 TABLET, FILM COATED ORAL at 08:17

## 2021-06-04 NOTE — PROGRESS NOTES
Progress Note - OB/GYN   Jose Alejandro Pinto 30 y o  female MRN: [id number]  Unit/Bed#: -01 Encounter: [id number]    Assessment:  30 y o  [id number] s/p Spontaneous Vaginal Delivery Postpartum day  1  Pregnancy complicated by delivery at 36 weeks 6 days, tobacco use; delivery complicated by postpartum bleeding the QBL of 1083 CC  This morning, patient is tearful a; struggling with emotion surrounding previous  loss at 23 weeks 3 days  Patient recovering well, Stable    Plan:  1  Postpartum  Continue routine post partum care  Pain management PRN  Encourage ambulation  Encourage breastfeeding    2  QBL 1083 CC  Patient received 8 hour bad of pit, Methergine, Hemabate  Hemoglobin 11 7 --> 11 2  Coags WNL    3  Tearfulness, history of  loss at 23 weeks 3 days  Case management consult placed    4  Discharge  Anticipate d/c tomorrow, as baby was       Subjective/Objective   Chief Complaint:    Postpartum state    Subjective:   Pain: yes, cramping, improved with meds  Tolerating PO: yes  Voiding: yes  Flatus: yes  BM: no  Ambulating: yes  Breastfeeding:  yes  Chest pain: no  Shortness of breath: no  Leg pain: no  Lochia: minimal    Objective:     Vitals: Temp:  [97 7 °F (36 5 °C)-99 4 °F (37 4 °C)] 98 6 °F (37 °C)  HR:  [50-83] 64  Resp:  [16-20] 18  BP: ()/(54-81) 105/62       Intake/Output Summary (Last 24 hours) at 2021 0716  Last data filed at 6/3/2021 1659  Gross per 24 hour   Intake 1000 ml   Output 2033 ml   Net -1033 ml         Physical Exam:   General: NAD, alert, oriented  Cardio: Regular rate and rhythm, no murmur  Resp: nonlabored breathing, clear to auscultation bilaterally  Abdomen: Soft, no distension/rebound/guarding/tenderness   Fundus: Firm, non-tender, fundus:   At umbilicus  G/U:  Minimal  lochia noted on pad  Lower Extremities: Non-tender, no palpable cords    Medications:  Current Facility-Administered Medications   Medication Dose Route Frequency    acetaminophen (TYLENOL) tablet 650 mg  650 mg Oral Q6H PRN    benzocaine-menthol-lanolin-aloe (DERMOPLAST) 20-0 5 % topical spray   Topical 4x Daily PRN    calcium carbonate (TUMS) chewable tablet 1,000 mg  1,000 mg Oral Daily PRN    diphenhydrAMINE (BENADRYL) tablet 25 mg  25 mg Oral Q6H PRN    docusate sodium (COLACE) capsule 100 mg  100 mg Oral BID    hydrocortisone 1 % cream 1 application  1 application Topical 4x Daily PRN    ibuprofen (MOTRIN) tablet 600 mg  600 mg Oral Q6H PRN    loperamide (IMODIUM) capsule 2 mg  2 mg Oral 4x Daily PRN    ondansetron (ZOFRAN) injection 4 mg  4 mg Intravenous Q8H PRN    oxyCODONE (ROXICODONE) IR tablet 2 5 mg  2 5 mg Oral Q4H PRN    oxytocin (PITOCIN) 30 Units in lactated ringers 500 mL infusion  250 chinedu-units/min Intravenous Continuous    prenatal multivitamin tablet 1 tablet  1 tablet Oral Daily    simethicone (MYLICON) chewable tablet 80 mg  80 mg Oral 4x Daily PRN    witch hazel-glycerin (TUCKS) topical pad 1 pad  1 pad Topical Q4H PRN       Labs:   Recent Results (from the past 24 hour(s))   Blood gas, arterial, cord    Collection Time: 06/03/21  9:09 AM   Result Value Ref Range    pH, Cord Art 7 354 7 230 - 7 430    pCO2, Cord Art 41 6 30 0 - 60 0    pO2, Cord Art 30 1 (H) 5 0 - 25 0 mm HG    HCO3, Cord Art 22 7 17 3 - 27 3 mmol/L    Base Exc, Cord Art -2 7 (L) 3 0 - 11 0 mmol/L    O2 Content, Cord Art 14 4 ml/dl    O2 Hgb, Arterial Cord 72 4 %   Blood gas, venous, cord    Collection Time: 06/03/21  9:09 AM   Result Value Ref Range    pH, Cord Ed 7 339 7 190 - 7 490    pCO2, Cord Ed 42 3 27 0 - 43 0 mm HG    pO2, Cord Ed 30 7 15 0 - 45 0 mm HG    HCO3, Cord Ed 22 3 12 2 - 28 6 mmol/L    Base Exc, Cord Ed -3 4 (L) 1 0 - 9 0 mmol/L    O2 Cont, Cord Ed 15 2 mL/dL    O2 HGB,VENOUS CORD 73 0 %   CBC and differential    Collection Time: 06/03/21  2:48 PM   Result Value Ref Range    WBC 10 77 (H) 4 31 - 10 16 Thousand/uL    RBC 3 61 (L) 3 81 - 5 12 Million/uL Hemoglobin 11 2 (L) 11 5 - 15 4 g/dL    Hematocrit 35 7 34 8 - 46 1 %    MCV 99 (H) 82 - 98 fL    MCH 31 0 26 8 - 34 3 pg    MCHC 31 4 31 4 - 37 4 g/dL    RDW 14 2 11 6 - 15 1 %    MPV 9 6 8 9 - 12 7 fL    Platelets 995 387 - 988 Thousands/uL    nRBC 0 /100 WBCs    Neutrophils Relative 64 43 - 75 %    Immat GRANS % 1 0 - 2 %    Lymphocytes Relative 25 14 - 44 %    Monocytes Relative 9 4 - 12 %    Eosinophils Relative 1 0 - 6 %    Basophils Relative 0 0 - 1 %    Neutrophils Absolute 6 80 1 85 - 7 62 Thousands/µL    Immature Grans Absolute 0 15 0 00 - 0 20 Thousand/uL    Lymphocytes Absolute 2 71 0 60 - 4 47 Thousands/µL    Monocytes Absolute 0 99 0 17 - 1 22 Thousand/µL    Eosinophils Absolute 0 09 0 00 - 0 61 Thousand/µL    Basophils Absolute 0 03 0 00 - 0 10 Thousands/µL   Protime-INR    Collection Time: 06/03/21  2:48 PM   Result Value Ref Range    Protime 13 6 11 6 - 14 5 seconds    INR 1 03 0 84 - 1 19   APTT    Collection Time: 06/03/21  2:48 PM   Result Value Ref Range    PTT 32 23 - 37 seconds   Fibrinogen    Collection Time: 06/03/21  2:48 PM   Result Value Ref Range    Fibrinogen 385 227 - 495 mg/dL         Philip Evangelista  Ob/Gyn PGY-1  6/4/2021  7:16 AM

## 2021-06-04 NOTE — PLAN OF CARE
Problem: PAIN - ADULT  Goal: Verbalizes/displays adequate comfort level or baseline comfort level  Description: Interventions:  - Encourage patient to monitor pain and request assistance  - Assess pain using appropriate pain scale  - Administer analgesics based on type and severity of pain and evaluate response  - Implement non-pharmacological measures as appropriate and evaluate response  - Consider cultural and social influences on pain and pain management  - Notify physician/advanced practitioner if interventions unsuccessful or patient reports new pain  Outcome: Progressing     Problem: INFECTION - ADULT  Goal: Absence or prevention of progression during hospitalization  Description: INTERVENTIONS:  - Assess and monitor for signs and symptoms of infection  - Monitor lab/diagnostic results  - Monitor all insertion sites, i e  indwelling lines, tubes, and drains  - Monitor endotracheal if appropriate and nasal secretions for changes in amount and color  - [place] appropriate cooling/warming therapies per order  - Administer medications as ordered  - Instruct and encourage patient and family to use good hand hygiene technique  - Identify and instruct in appropriate isolation precautions for identified infection/condition  Outcome: Progressing  Goal: Absence of fever/infection during neutropenic period  Description: INTERVENTIONS:  - Monitor WBC    Outcome: Progressing     Problem: SAFETY ADULT  Goal: Patient will remain free of falls  Description: INTERVENTIONS:  - Assess patient frequently for physical needs  -  Identify cognitive and physical deficits and behaviors that affect risk of falls    -  [place] fall precautions as indicated by assessment   - Educate patient/family on patient safety including physical limitations  - Instruct patient to call for assistance with activity based on assessment  - Modify environment to reduce risk of injury  - Consider OT/PT consult to assist with strengthening/mobility  Outcome: Progressing  Goal: Maintain or return to baseline ADL function  Description: INTERVENTIONS:  -  Assess patient's ability to carry out ADLs; assess patient's baseline for ADL function and identify physical deficits which impact ability to perform ADLs (bathing, care of mouth/teeth, toileting, grooming, dressing, etc )  - Assess/evaluate cause of self-care deficits   - Assess range of motion  - Assess patient's mobility; develop plan if impaired  - Assess patient's need for assistive devices and provide as appropriate  - Encourage maximum independence but intervene and supervise when necessary  - Involve family in performance of ADLs  - Assess for home care needs following discharge   - Consider OT consult to assist with ADL evaluation and planning for discharge  - Provide patient education as appropriate  Outcome: Progressing  Goal: Maintain or return mobility status to optimal level  Description: INTERVENTIONS:  - Assess patient's baseline mobility status (ambulation, transfers, stairs, etc )    - Identify cognitive and physical deficits and behaviors that affect mobility  - Identify mobility aids required to assist with transfers and/or ambulation (gait belt, sit-to-stand, lift, walker, cane, etc )  - [place] fall precautions as indicated by assessment  - Record patient progress and toleration of activity level on Mobility SBAR; progress patient to next Phase/Stage  - Instruct patient to call for assistance with activity based on assessment  - Consider rehabilitation consult to assist with strengthening/weightbearing, etc   Outcome: Progressing     Problem: DISCHARGE PLANNING  Goal: Discharge to home or other facility with appropriate resources  Description: INTERVENTIONS:  - Identify barriers to discharge w/patient and caregiver  - Arrange for needed discharge resources and transportation as appropriate  - Identify discharge learning needs (meds, wound care, etc )  - Arrange for interpretive services to assist at discharge as needed  - Refer to Case Management Department for coordinating discharge planning if the patient needs post-hospital services based on physician/advanced practitioner order or complex needs related to functional status, cognitive ability, or social support system  Outcome: Progressing     Problem: POSTPARTUM  Goal: Experiences normal postpartum course  Description: INTERVENTIONS:  - Monitor maternal vital signs  - Assess uterine involution and lochia  Outcome: Progressing  Goal: Appropriate maternal -  bonding  Description: INTERVENTIONS:  - Identify family support  - Assess for appropriate maternal/infant bonding   -Encourage maternal/infant bonding opportunities  - Referral to  or  as needed  Outcome: Progressing  Goal: Establishment of infant feeding pattern  Description: INTERVENTIONS:  - Assess breast/bottle feeding  - Refer to lactation as needed  Outcome: Progressing

## 2021-06-04 NOTE — PLAN OF CARE
Problem: PAIN - ADULT  Goal: Verbalizes/displays adequate comfort level or baseline comfort level  Description: Interventions:  - Encourage patient to monitor pain and request assistance  - Assess pain using appropriate pain scale  - Administer analgesics based on type and severity of pain and evaluate response  - Implement non-pharmacological measures as appropriate and evaluate response  - Consider cultural and social influences on pain and pain management  - Notify physician/advanced practitioner if interventions unsuccessful or patient reports new pain  Outcome: Progressing     Problem: INFECTION - ADULT  Goal: Absence or prevention of progression during hospitalization  Description: INTERVENTIONS:  - Assess and monitor for signs and symptoms of infection  - Monitor lab/diagnostic results  - Monitor all insertion sites, i e  indwelling lines, tubes, and drains  - Monitor endotracheal if appropriate and nasal secretions for changes in amount and color  - [place] appropriate cooling/warming therapies per order  - Administer medications as ordered  - Instruct and encourage patient and family to use good hand hygiene technique  - Identify and instruct in appropriate isolation precautions for identified infection/condition  Outcome: Progressing  Goal: Absence of fever/infection during neutropenic period  Description: INTERVENTIONS:  - Monitor WBC    Outcome: Progressing     Problem: SAFETY ADULT  Goal: Patient will remain free of falls  Description: INTERVENTIONS:  - Assess patient frequently for physical needs  -  Identify cognitive and physical deficits and behaviors that affect risk of falls    -  [place] fall precautions as indicated by assessment   - Educate patient/family on patient safety including physical limitations  - Instruct patient to call for assistance with activity based on assessment  - Modify environment to reduce risk of injury  - Consider OT/PT consult to assist with strengthening/mobility  Outcome: Progressing  Goal: Maintain or return to baseline ADL function  Description: INTERVENTIONS:  -  Assess patient's ability to carry out ADLs; assess patient's baseline for ADL function and identify physical deficits which impact ability to perform ADLs (bathing, care of mouth/teeth, toileting, grooming, dressing, etc )  - Assess/evaluate cause of self-care deficits   - Assess range of motion  - Assess patient's mobility; develop plan if impaired  - Assess patient's need for assistive devices and provide as appropriate  - Encourage maximum independence but intervene and supervise when necessary  - Involve family in performance of ADLs  - Assess for home care needs following discharge   - Consider OT consult to assist with ADL evaluation and planning for discharge  - Provide patient education as appropriate  Outcome: Progressing  Goal: Maintain or return mobility status to optimal level  Description: INTERVENTIONS:  - Assess patient's baseline mobility status (ambulation, transfers, stairs, etc )    - Identify cognitive and physical deficits and behaviors that affect mobility  - Identify mobility aids required to assist with transfers and/or ambulation (gait belt, sit-to-stand, lift, walker, cane, etc )  - [place] fall precautions as indicated by assessment  - Record patient progress and toleration of activity level on Mobility SBAR; progress patient to next Phase/Stage  - Instruct patient to call for assistance with activity based on assessment  - Consider rehabilitation consult to assist with strengthening/weightbearing, etc   Outcome: Progressing     Problem: DISCHARGE PLANNING  Goal: Discharge to home or other facility with appropriate resources  Description: INTERVENTIONS:  - Identify barriers to discharge w/patient and caregiver  - Arrange for needed discharge resources and transportation as appropriate  - Identify discharge learning needs (meds, wound care, etc )  - Arrange for interpretive services to assist at discharge as needed  - Refer to Case Management Department for coordinating discharge planning if the patient needs post-hospital services based on physician/advanced practitioner order or complex needs related to functional status, cognitive ability, or social support system  Outcome: Progressing     Problem: POSTPARTUM  Goal: Experiences normal postpartum course  Description: INTERVENTIONS:  - Monitor maternal vital signs  - Assess uterine involution and lochia  Outcome: Progressing  Goal: Appropriate maternal -  bonding  Description: INTERVENTIONS:  - Identify family support  - Assess for appropriate maternal/infant bonding   -Encourage maternal/infant bonding opportunities  - Referral to  or  as needed  Outcome: Progressing  Goal: Establishment of infant feeding pattern  Description: INTERVENTIONS:  - Assess breast/bottle feeding  - Refer to lactation as needed  Outcome: Progressing

## 2021-06-04 NOTE — CASE MANAGEMENT
Consult: Patient is struggling emotionally with the new role as mother to this new baby, as she has a history of  loss at 23 weeks 3 days   Outpatient resources with respect to postpartum depression appreciated  SW met with MOB @ bedside with introduction  Reports that this is third child, shares custody with her mother for 9yr old  Has 1 yo  Lives with FOB/ Corie Paul and has baby items, support system and transportation  MOB reports having hx anxiety, depression, PTSD, previously in treatment with [facility] for behavioral health services  Attempted to discuss resuming care with [facility] and MOB advised that she feels that she needs support from her  right now, and attempting to have  return to hospital  Requested to have SW leave now, and will let nurse know if she would like to meet with weekend SW tomorrow  MOB agreeable to being provided with [facility] information via [facility] via text message  SW sent same per pt request      Provided [address] information at bedside as resource surrounding postpartum depression support  No additional SW needs at this time

## 2021-06-05 VITALS
HEIGHT: 66 IN | HEART RATE: 72 BPM | OXYGEN SATURATION: 97 % | WEIGHT: 140 LBS | DIASTOLIC BLOOD PRESSURE: 62 MMHG | BODY MASS INDEX: 22.5 KG/M2 | SYSTOLIC BLOOD PRESSURE: 104 MMHG | TEMPERATURE: 98.4 F | RESPIRATION RATE: 18 BRPM

## 2021-06-05 PROCEDURE — 0JHF3HZ INSERTION OF CONTRACEPTIVE DEVICE INTO LEFT UPPER ARM SUBCUTANEOUS TISSUE AND FASCIA, PERCUTANEOUS APPROACH: ICD-10-PCS | Performed by: OBSTETRICS & GYNECOLOGY

## 2021-06-05 RX ORDER — LIDOCAINE HYDROCHLORIDE 10 MG/ML
5 INJECTION, SOLUTION EPIDURAL; INFILTRATION; INTRACAUDAL; PERINEURAL ONCE
Status: COMPLETED | OUTPATIENT
Start: 2021-06-05 | End: 2021-06-05

## 2021-06-05 RX ORDER — ACETAMINOPHEN 325 MG/1
650 TABLET ORAL EVERY 6 HOURS PRN
Qty: 30 TABLET | Refills: 0 | Status: SHIPPED | OUTPATIENT
Start: 2021-06-05 | End: 2022-02-07

## 2021-06-05 RX ORDER — IBUPROFEN 600 MG/1
600 TABLET ORAL EVERY 6 HOURS PRN
Qty: 30 TABLET | Refills: 0 | Status: SHIPPED | OUTPATIENT
Start: 2021-06-05 | End: 2022-02-07

## 2021-06-05 RX ADMIN — IBUPROFEN 600 MG: 600 TABLET, FILM COATED ORAL at 05:53

## 2021-06-05 RX ADMIN — Medication 1 TABLET: at 08:42

## 2021-06-05 RX ADMIN — IBUPROFEN 600 MG: 600 TABLET, FILM COATED ORAL at 11:18

## 2021-06-05 RX ADMIN — ACETAMINOPHEN 650 MG: 325 TABLET, FILM COATED ORAL at 08:42

## 2021-06-05 RX ADMIN — OXYCODONE HYDROCHLORIDE 2.5 MG: 5 TABLET ORAL at 11:18

## 2021-06-05 RX ADMIN — ETONOGESTREL 68 MG: 68 IMPLANT SUBCUTANEOUS at 10:47

## 2021-06-05 RX ADMIN — LIDOCAINE HYDROCHLORIDE 5 ML: 10 INJECTION, SOLUTION EPIDURAL; INFILTRATION; INTRACAUDAL at 10:46

## 2021-06-05 NOTE — PROGRESS NOTES
Progress Note - OB/GYN   Elena Pitts 30 y o  female MRN: [id number]  Unit/Bed#: -01 Encounter: [id number]    Assessment:  30 y o  [id number] s/p Spontaneous Vaginal Delivery Postpartum day  2    Plan:  1  Routine post-partum care  2  Encourage ambulation  3  Pain control as needed  4  Advance diet as tolerated  5  Rhogam not indicated  6  PPH s/p hemabate, methergine, pitocin  7  Contraception: nexplanon  8  Anticipate discharge today    Subjective/Objective   Chief Complaint:       Subjective: Elena Pitts is well appearing and has no complaints at this time  She denies any dizziness, nausea, vomiting, chest pain, shortness of breath, palpitations, or headaches  Pain: Well controlled with pain medication regimen  Tolerating PO: yes  Voiding: yes  Flatus: yes  BM: no  Ambulating: yes  Breastfeeding: no  Chest pain: no  Shortness of breath: no  Leg pain: no  Lochia: Decreasing    Objective:     Vitals: Blood pressure 104/62, pulse 72, temperature 98 4 °F (36 9 °C), temperature source Oral, resp  rate 18, height 5' 5 98" (1 676 m), weight 63 5 kg (140 lb), last menstrual period 10/09/2020, SpO2 97 %, not currently breastfeeding    No intake or output data in the 24 hours ending 06/05/21 0919    Physical Exam:     General: NAD  Cardiovascular: RRR, no murmur, nl S1/S2   Lungs: CTAB, non-labored breathing   Abdomen: Soft, no distension/rebound/guarding/tenderness   Fundus: Firm, non-tender, fundus: -3 cm below the umbilicus   Lower Extremities: Non-tender    Lab, Imaging and other studies:     Recent Results (from the past 72 hour(s))   Type and screen    Collection Time: 06/03/21  5:34 AM   Result Value Ref Range    ABO Grouping O     Rh Factor Positive     Antibody Screen Negative     Specimen Expiration Date [id number]    CBC    Collection Time: 06/03/21  5:34 AM   Result Value Ref Range    WBC 8 93 4 31 - 10 16 Thousand/uL    RBC 3 76 (L) 3 81 - 5 12 Million/uL    Hemoglobin 11 7 11 5 - 15 4 g/dL Hematocrit 37 5 34 8 - 46 1 %     (H) 82 - 98 fL    MCH 31 1 26 8 - 34 3 pg    MCHC 31 2 (L) 31 4 - 37 4 g/dL    RDW 14 5 11 6 - 15 1 %    Platelets 530 626 - 358 Thousands/uL    MPV 10 0 8 9 - 12 7 fL   RPR    Collection Time: 06/03/21  5:34 AM   Result Value Ref Range    RPR Non-Reactive Non-Reactive   Blood gas, arterial, cord    Collection Time: 06/03/21  9:09 AM   Result Value Ref Range    pH, Cord Art 7 354 7 230 - 7 430    pCO2, Cord Art 41 6 30 0 - 60 0    pO2, Cord Art 30 1 (H) 5 0 - 25 0 mm HG    HCO3, Cord Art 22 7 17 3 - 27 3 mmol/L    Base Exc, Cord Art -2 7 (L) 3 0 - 11 0 mmol/L    O2 Content, Cord Art 14 4 ml/dl    O2 Hgb, Arterial Cord 72 4 %   Blood gas, venous, cord    Collection Time: 06/03/21  9:09 AM   Result Value Ref Range    pH, Cord Ed 7 339 7 190 - 7 490    pCO2, Cord Ed 42 3 27 0 - 43 0 mm HG    pO2, Cord Ed 30 7 15 0 - 45 0 mm HG    HCO3, Cord Ed 22 3 12 2 - 28 6 mmol/L    Base Exc, Cord Ed -3 4 (L) 1 0 - 9 0 mmol/L    O2 Cont, Cord Ed 15 2 mL/dL    O2 HGB,VENOUS CORD 73 0 %   CBC and differential    Collection Time: 06/03/21  2:48 PM   Result Value Ref Range    WBC 10 77 (H) 4 31 - 10 16 Thousand/uL    RBC 3 61 (L) 3 81 - 5 12 Million/uL    Hemoglobin 11 2 (L) 11 5 - 15 4 g/dL    Hematocrit 35 7 34 8 - 46 1 %    MCV 99 (H) 82 - 98 fL    MCH 31 0 26 8 - 34 3 pg    MCHC 31 4 31 4 - 37 4 g/dL    RDW 14 2 11 6 - 15 1 %    MPV 9 6 8 9 - 12 7 fL    Platelets 306 271 - 342 Thousands/uL    nRBC 0 /100 WBCs    Neutrophils Relative 64 43 - 75 %    Immat GRANS % 1 0 - 2 %    Lymphocytes Relative 25 14 - 44 %    Monocytes Relative 9 4 - 12 %    Eosinophils Relative 1 0 - 6 %    Basophils Relative 0 0 - 1 %    Neutrophils Absolute 6 80 1 85 - 7 62 Thousands/µL    Immature Grans Absolute 0 15 0 00 - 0 20 Thousand/uL    Lymphocytes Absolute 2 71 0 60 - 4 47 Thousands/µL    Monocytes Absolute 0 99 0 17 - 1 22 Thousand/µL    Eosinophils Absolute 0 09 0 00 - 0 61 Thousand/µL    Basophils Absolute 0 03 0 00 - 0 10 Thousands/µL   Protime-INR    Collection Time: 06/03/21  2:48 PM   Result Value Ref Range    Protime 13 6 11 6 - 14 5 seconds    INR 1 03 0 84 - 1 19   APTT    Collection Time: 06/03/21  2:48 PM   Result Value Ref Range    PTT 32 23 - 37 seconds   Fibrinogen    Collection Time: 06/03/21  2:48 PM   Result Value Ref Range    Fibrinogen 385 227 - 495 mg/dL     Meds:  docusate sodium, 100 mg, Oral, BID  prenatal multivitamin, 1 tablet, Oral, Daily      acetaminophen, 650 mg, Q6H PRN  benzocaine-menthol-lanolin-aloe, , 4x Daily PRN  calcium carbonate, 1,000 mg, Daily PRN  diphenhydrAMINE, 25 mg, Q6H PRN  hydrocortisone, 1 application, 4x Daily PRN  ibuprofen, 600 mg, Q6H PRN  loperamide, 2 mg, 4x Daily PRN  ondansetron, 4 mg, Q8H PRN  oxyCODONE, 2 5 mg, Q4H PRN  simethicone, 80 mg, 4x Daily PRN  witch hazel-glycerin, 1 pad, Q4H PRN              Signature / Title: Courtland Cowden, MD, Ob/Gyn, PGY-2  Date: 6/5/2021  Time: 9:19 AM

## 2021-06-05 NOTE — PLAN OF CARE
Problem: PAIN - ADULT  Goal: Verbalizes/displays adequate comfort level or baseline comfort level  Description: Interventions:  - Encourage patient to monitor pain and request assistance  - Assess pain using appropriate pain scale  - Administer analgesics based on type and severity of pain and evaluate response  - Implement non-pharmacological measures as appropriate and evaluate response  - Consider cultural and social influences on pain and pain management  - Notify physician/advanced practitioner if interventions unsuccessful or patient reports new pain  Outcome: Progressing     Problem: INFECTION - ADULT  Goal: Absence or prevention of progression during hospitalization  Description: INTERVENTIONS:  - Assess and monitor for signs and symptoms of infection  - Monitor lab/diagnostic results  - Monitor all insertion sites, i e  indwelling lines, tubes, and drains  - Monitor endotracheal if appropriate and nasal secretions for changes in amount and color  - [place] appropriate cooling/warming therapies per order  - Administer medications as ordered  - Instruct and encourage patient and family to use good hand hygiene technique  - Identify and instruct in appropriate isolation precautions for identified infection/condition  Outcome: Progressing  Goal: Absence of fever/infection during neutropenic period  Description: INTERVENTIONS:  - Monitor WBC    Outcome: Progressing     Problem: SAFETY ADULT  Goal: Patient will remain free of falls  Description: INTERVENTIONS:  - Assess patient frequently for physical needs  -  Identify cognitive and physical deficits and behaviors that affect risk of falls    -  [place] fall precautions as indicated by assessment   - Educate patient/family on patient safety including physical limitations  - Instruct patient to call for assistance with activity based on assessment  - Modify environment to reduce risk of injury  - Consider OT/PT consult to assist with strengthening/mobility  Outcome: Progressing  Goal: Maintain or return to baseline ADL function  Description: INTERVENTIONS:  -  Assess patient's ability to carry out ADLs; assess patient's baseline for ADL function and identify physical deficits which impact ability to perform ADLs (bathing, care of mouth/teeth, toileting, grooming, dressing, etc )  - Assess/evaluate cause of self-care deficits   - Assess range of motion  - Assess patient's mobility; develop plan if impaired  - Assess patient's need for assistive devices and provide as appropriate  - Encourage maximum independence but intervene and supervise when necessary  - Involve family in performance of ADLs  - Assess for home care needs following discharge   - Consider OT consult to assist with ADL evaluation and planning for discharge  - Provide patient education as appropriate  Outcome: Progressing  Goal: Maintain or return mobility status to optimal level  Description: INTERVENTIONS:  - Assess patient's baseline mobility status (ambulation, transfers, stairs, etc )    - Identify cognitive and physical deficits and behaviors that affect mobility  - Identify mobility aids required to assist with transfers and/or ambulation (gait belt, sit-to-stand, lift, walker, cane, etc )  - [place] fall precautions as indicated by assessment  - Record patient progress and toleration of activity level on Mobility SBAR; progress patient to next Phase/Stage  - Instruct patient to call for assistance with activity based on assessment  - Consider rehabilitation consult to assist with strengthening/weightbearing, etc   Outcome: Progressing     Problem: DISCHARGE PLANNING  Goal: Discharge to home or other facility with appropriate resources  Description: INTERVENTIONS:  - Identify barriers to discharge w/patient and caregiver  - Arrange for needed discharge resources and transportation as appropriate  - Identify discharge learning needs (meds, wound care, etc )  - Arrange for interpretive services to assist at discharge as needed  - Refer to Case Management Department for coordinating discharge planning if the patient needs post-hospital services based on physician/advanced practitioner order or complex needs related to functional status, cognitive ability, or social support system  Outcome: Progressing     Problem: POSTPARTUM  Goal: Experiences normal postpartum course  Description: INTERVENTIONS:  - Monitor maternal vital signs  - Assess uterine involution and lochia  Outcome: Progressing  Goal: Appropriate maternal -  bonding  Description: INTERVENTIONS:  - Identify family support  - Assess for appropriate maternal/infant bonding   -Encourage maternal/infant bonding opportunities  - Referral to  or  as needed  Outcome: Progressing  Goal: Establishment of infant feeding pattern  Description: INTERVENTIONS:  - Assess breast/bottle feeding  - Refer to lactation as needed  Outcome: Progressing

## 2021-06-05 NOTE — PROCEDURES
Procedure: Nexplanon insertion    The patient is right handed  The left arm was chosen  Consent was explained and signed  The patient was not pregnant; she just delivered a baby  The patient was positioned supine with her arm externally rotated and flexed at the elbow with her hand behind her head  The insertion site was chosen 8-10 cm medial to the medial epicondyle, and 3-5 cm posterior the sulcus between the bicep and tricep in order to avoid the vascular bundle and ulnar nerve  The skin was cleansed with betadine  1% lidocaine was infiltrated at the insertion site  The device was removed from its package  The implant was visible within the insertion device  The insertion was performed according to standard procedure: The tip of the  was passed through the skin, the skin was tented up, and the device was advanced until its full length was beneath the skin  The trigger was activated at and the sheath withdrawn  The implant was successfully deployed  There was a small amount of bleeding from the skin edge which was controlled with pressure  The implant was palpable by me and the patient  A steri strip was placed over the insertion site  The patient tolerated the procedure well      Lot number: [id number]  Expiration: 4/2/2023      Elsie Etienne MD, PGY-3  6/5/2021  1:08 PM

## 2021-06-05 NOTE — PLAN OF CARE
Problem: PAIN - ADULT  Goal: Verbalizes/displays adequate comfort level or baseline comfort level  Description: Interventions:  - Encourage patient to monitor pain and request assistance  - Assess pain using appropriate pain scale  - Administer analgesics based on type and severity of pain and evaluate response  - Implement non-pharmacological measures as appropriate and evaluate response  - Consider cultural and social influences on pain and pain management  - Notify physician/advanced practitioner if interventions unsuccessful or patient reports new pain  Outcome: Progressing     Problem: INFECTION - ADULT  Goal: Absence or prevention of progression during hospitalization  Description: INTERVENTIONS:  - Assess and monitor for signs and symptoms of infection  - Monitor lab/diagnostic results  - Monitor all insertion sites, i e  indwelling lines, tubes, and drains  - Monitor endotracheal if appropriate and nasal secretions for changes in amount and color  - [place] appropriate cooling/warming therapies per order  - Administer medications as ordered  - Instruct and encourage patient and family to use good hand hygiene technique  - Identify and instruct in appropriate isolation precautions for identified infection/condition  Outcome: Progressing  Goal: Absence of fever/infection during neutropenic period  Description: INTERVENTIONS:  - Monitor WBC    Outcome: Progressing     Problem: SAFETY ADULT  Goal: Patient will remain free of falls  Description: INTERVENTIONS:  - Assess patient frequently for physical needs  -  Identify cognitive and physical deficits and behaviors that affect risk of falls    -  [place] fall precautions as indicated by assessment   - Educate patient/family on patient safety including physical limitations  - Instruct patient to call for assistance with activity based on assessment  - Modify environment to reduce risk of injury  - Consider OT/PT consult to assist with strengthening/mobility  Outcome: Progressing  Goal: Maintain or return to baseline ADL function  Description: INTERVENTIONS:  -  Assess patient's ability to carry out ADLs; assess patient's baseline for ADL function and identify physical deficits which impact ability to perform ADLs (bathing, care of mouth/teeth, toileting, grooming, dressing, etc )  - Assess/evaluate cause of self-care deficits   - Assess range of motion  - Assess patient's mobility; develop plan if impaired  - Assess patient's need for assistive devices and provide as appropriate  - Encourage maximum independence but intervene and supervise when necessary  - Involve family in performance of ADLs  - Assess for home care needs following discharge   - Consider OT consult to assist with ADL evaluation and planning for discharge  - Provide patient education as appropriate  Outcome: Progressing  Goal: Maintain or return mobility status to optimal level  Description: INTERVENTIONS:  - Assess patient's baseline mobility status (ambulation, transfers, stairs, etc )    - Identify cognitive and physical deficits and behaviors that affect mobility  - Identify mobility aids required to assist with transfers and/or ambulation (gait belt, sit-to-stand, lift, walker, cane, etc )  - [place] fall precautions as indicated by assessment  - Record patient progress and toleration of activity level on Mobility SBAR; progress patient to next Phase/Stage  - Instruct patient to call for assistance with activity based on assessment  - Consider rehabilitation consult to assist with strengthening/weightbearing, etc   Outcome: Progressing     Problem: DISCHARGE PLANNING  Goal: Discharge to home or other facility with appropriate resources  Description: INTERVENTIONS:  - Identify barriers to discharge w/patient and caregiver  - Arrange for needed discharge resources and transportation as appropriate  - Identify discharge learning needs (meds, wound care, etc )  - Arrange for interpretive services to assist at discharge as needed  - Refer to Case Management Department for coordinating discharge planning if the patient needs post-hospital services based on physician/advanced practitioner order or complex needs related to functional status, cognitive ability, or social support system  Outcome: Progressing     Problem: POSTPARTUM  Goal: Experiences normal postpartum course  Description: INTERVENTIONS:  - Monitor maternal vital signs  - Assess uterine involution and lochia  Outcome: Progressing  Goal: Appropriate maternal -  bonding  Description: INTERVENTIONS:  - Identify family support  - Assess for appropriate maternal/infant bonding   -Encourage maternal/infant bonding opportunities  - Referral to  or  as needed  Outcome: Progressing  Goal: Establishment of infant feeding pattern  Description: INTERVENTIONS:  - Assess breast/bottle feeding  - Refer to lactation as needed  Outcome: Progressing

## 2021-06-05 NOTE — PLAN OF CARE
Problem: PAIN - ADULT  Goal: Verbalizes/displays adequate comfort level or baseline comfort level  Description: Interventions:  - Encourage patient to monitor pain and request assistance  - Assess pain using appropriate pain scale  - Administer analgesics based on type and severity of pain and evaluate response  - Implement non-pharmacological measures as appropriate and evaluate response  - Consider cultural and social influences on pain and pain management  - Notify physician/advanced practitioner if interventions unsuccessful or patient reports new pain  6/5/2021 1114 by Fernando Rodriguez RN  Outcome: Completed  6/5/2021 0907 by Fernando Rodriguez RN  Outcome: Progressing     Problem: INFECTION - ADULT  Goal: Absence or prevention of progression during hospitalization  Description: INTERVENTIONS:  - Assess and monitor for signs and symptoms of infection  - Monitor lab/diagnostic results  - Monitor all insertion sites, i e  indwelling lines, tubes, and drains  - Monitor endotracheal if appropriate and nasal secretions for changes in amount and color  - [place] appropriate cooling/warming therapies per order  - Administer medications as ordered  - Instruct and encourage patient and family to use good hand hygiene technique  - Identify and instruct in appropriate isolation precautions for identified infection/condition  6/5/2021 1114 by Fernando Rodriguez RN  Outcome: Completed  6/5/2021 0907 by Fernando Rodriguez RN  Outcome: Progressing  Goal: Absence of fever/infection during neutropenic period  Description: INTERVENTIONS:  - Monitor WBC    6/5/2021 1114 by Fernando Rodriguez RN  Outcome: Completed  6/5/2021 0907 by Fernando Rodriguez RN  Outcome: Progressing     Problem: SAFETY ADULT  Goal: Patient will remain free of falls  Description: INTERVENTIONS:  - Assess patient frequently for physical needs  -  Identify cognitive and physical deficits and behaviors that affect risk of falls    -  [place] fall precautions as indicated by assessment   - Educate patient/family on patient safety including physical limitations  - Instruct patient to call for assistance with activity based on assessment  - Modify environment to reduce risk of injury  - Consider OT/PT consult to assist with strengthening/mobility  6/5/2021 1114 by Marylen Pound, RN  Outcome: Completed  6/5/2021 0907 by Marylen Pound, RN  Outcome: Progressing  Goal: Maintain or return to baseline ADL function  Description: INTERVENTIONS:  -  Assess patient's ability to carry out ADLs; assess patient's baseline for ADL function and identify physical deficits which impact ability to perform ADLs (bathing, care of mouth/teeth, toileting, grooming, dressing, etc )  - Assess/evaluate cause of self-care deficits   - Assess range of motion  - Assess patient's mobility; develop plan if impaired  - Assess patient's need for assistive devices and provide as appropriate  - Encourage maximum independence but intervene and supervise when necessary  - Involve family in performance of ADLs  - Assess for home care needs following discharge   - Consider OT consult to assist with ADL evaluation and planning for discharge  - Provide patient education as appropriate  6/5/2021 1114 by Marylen Pound, RN  Outcome: Completed  6/5/2021 0907 by Marylen Pound, RN  Outcome: Progressing  Goal: Maintain or return mobility status to optimal level  Description: INTERVENTIONS:  - Assess patient's baseline mobility status (ambulation, transfers, stairs, etc )    - Identify cognitive and physical deficits and behaviors that affect mobility  - Identify mobility aids required to assist with transfers and/or ambulation (gait belt, sit-to-stand, lift, walker, cane, etc )  - [place] fall precautions as indicated by assessment  - Record patient progress and toleration of activity level on Mobility SBAR; progress patient to next Phase/Stage  - Instruct patient to call for assistance with activity based on assessment  - Consider rehabilitation consult to assist with strengthening/weightbearing, etc   2021 1114 by Siva Sim RN  Outcome: Completed  2021 by Siva Sim RN  Outcome: Progressing     Problem: DISCHARGE PLANNING  Goal: Discharge to home or other facility with appropriate resources  Description: INTERVENTIONS:  - Identify barriers to discharge w/patient and caregiver  - Arrange for needed discharge resources and transportation as appropriate  - Identify discharge learning needs (meds, wound care, etc )  - Arrange for interpretive services to assist at discharge as needed  - Refer to Case Management Department for coordinating discharge planning if the patient needs post-hospital services based on physician/advanced practitioner order or complex needs related to functional status, cognitive ability, or social support system  2021 111 by Siva Sim RN  Outcome: Completed  2021 by Siva Sim RN  Outcome: Progressing     Problem: POSTPARTUM  Goal: Experiences normal postpartum course  Description: INTERVENTIONS:  - Monitor maternal vital signs  - Assess uterine involution and lochia  2021 1114 by Siva Sim RN  Outcome: Completed  2021 by Siva Sim RN  Outcome: Progressing  Goal: Appropriate maternal -  bonding  Description: INTERVENTIONS:  - Identify family support  - Assess for appropriate maternal/infant bonding   -Encourage maternal/infant bonding opportunities  - Referral to  or  as needed  2021 1114 by Siva Sim RN  Outcome: Completed  2021 by Siva Sim RN  Outcome: Progressing  Goal: Establishment of infant feeding pattern  Description: INTERVENTIONS:  - Assess breast/bottle feeding  - Refer to lactation as needed  2021 1114 by Siva Sim RN  Outcome: Completed  2021 by Siva Sim RN  Outcome: Progressing

## 2021-06-07 NOTE — UTILIZATION REVIEW
Inpatient Admission Authorization Request   Notification of Maternity/Delivery &  Birth Information for Admission   SERVICING FACILITY:   [address], [address] [place] [address]  Tax ID: [id number]  NPI: [id number]  Place of Service: Inpatient [facility]  [address]  Place of Service Code: 24     ATTENDING PROVIDER:  Attending Name and NPI#: Juany Rees Md [[id number]]  Address: [facility], [address] [place] [address]  Phone: [phone]     UTILIZATION REVIEW CONTACT:  [facility] Utilization   Network Utilization Review Department  Phone: [phone]  Fax [phone]  Email: [email or web address]     PHYSICIAN ADVISORY SERVICES:  FOR VOOK-DD-HNDH REVIEW - MEDICAL NECESSITY DENIAL  Phone: [phone]  Fax: [phone]  Email: [email or web address]     TYPE OF REQUEST:  Inpatient Status     ADMISSION INFORMATION:  ADMISSION DATE/TIME: 6/3/21 0532  PATIENT DIAGNOSIS CODE/DESCRIPTION:  36 weeks gestation of pregnancy [Z3A 36] The primary encounter diagnosis was 36 weeks gestation of pregnancy  A diagnosis of  (spontaneous vaginal delivery) was also pertinent to this visit  1  36 weeks gestation of pregnancy    2    (spontaneous vaginal delivery)      DISCHARGE DATE/TIME: 2021 11:15 AM  DISCHARGE DISPOSITION (IF DISCHARGED): Home/Self Care     MOTHER AND  INFORMATION:  Mother: Zak Joe 1993   Delivering clinician: [facility]   OB History        7    Para   4    Term   1       3    AB   3    Living   3       SAB   3    TAB   0    Ectopic   0    Multiple   0    Live Births   4           Obstetric Comments   Age at menarche: 10  Age at first child: 23  Cramps: Yes  Coitarche: 13               [place] Name & MRN:   Information for the patient's :  Dominic Corbin [[id number]]      Delivery Information:  Sex: male  Delivered 6/3/2021 9:05 AM by Vaginal, Spontaneous; Gestational Age: 36w7d    [place] Measurements:  Weight: 6 lb 10 7 oz (3025 g); Height: 19 5"    APGAR 1 minute 5 minutes 10 minutes   Totals: 9 9       Birth Information: 30 y o  female MRN: [id number] Unit/Bed#: -01 Estimated Date of Delivery: 21  Birthweight: No birth weight on file  Gestational Age: <None> Delivery Type: Vaginal, Spontaneous          APGARS  One minute Five minutes Ten minutes   Totals:               IMPORTANT INFORMATION:  Please contact the [facility] directly with any questions or concerns regarding this request  Department voicemails are confidential     Send requests for admission clinical reviews, concurrent reviews, approvals, and administrative denials due to lack of clinical to fax [phone]

## 2021-06-10 LAB — PLACENTA IN STORAGE: NORMAL

## 2021-06-14 ENCOUNTER — TELEPHONE (OUTPATIENT)
Dept: OBGYN CLINIC | Facility: CLINIC | Age: 26
End: 2021-06-14

## 2021-06-14 NOTE — TELEPHONE ENCOUNTER
Attempt to reach patient for transition of care  Message left for patient to call office with any questions/issues and gently reminded to schedule 3 week PP visit

## 2021-06-25 ENCOUNTER — APPOINTMENT (EMERGENCY)
Dept: ULTRASOUND IMAGING | Facility: HOSPITAL | Age: 26
End: 2021-06-25
Payer: COMMERCIAL

## 2021-06-25 ENCOUNTER — HOSPITAL ENCOUNTER (EMERGENCY)
Facility: HOSPITAL | Age: 26
Discharge: HOME | End: 2021-06-25
Attending: EMERGENCY MEDICINE | Admitting: EMERGENCY MEDICINE
Payer: COMMERCIAL

## 2021-06-25 VITALS
TEMPERATURE: 98.1 F | OXYGEN SATURATION: 98 % | SYSTOLIC BLOOD PRESSURE: 119 MMHG | DIASTOLIC BLOOD PRESSURE: 65 MMHG | BODY MASS INDEX: 22.78 KG/M2 | HEART RATE: 82 BPM | WEIGHT: 141.09 LBS | RESPIRATION RATE: 18 BRPM

## 2021-06-25 DIAGNOSIS — N93.9 VAGINAL BLEEDING: Primary | ICD-10-CM

## 2021-06-25 LAB
ANION GAP SERPL CALCULATED.3IONS-SCNC: 12 MMOL/L (ref 4–13)
APTT PPP: 28 SECONDS (ref 23–37)
BASOPHILS # BLD AUTO: 0.01 THOUSANDS/ΜL (ref 0–0.1)
BASOPHILS NFR BLD AUTO: 0 % (ref 0–1)
BUN SERPL-MCNC: 7 MG/DL (ref 5–25)
CALCIUM SERPL-MCNC: 8.3 MG/DL (ref 8.3–10.1)
CHLORIDE SERPL-SCNC: 103 MMOL/L (ref 100–108)
CO2 SERPL-SCNC: 24 MMOL/L (ref 21–32)
CREAT SERPL-MCNC: 0.75 MG/DL (ref 0.6–1.3)
EOSINOPHIL # BLD AUTO: 0.02 THOUSAND/ΜL (ref 0–0.61)
EOSINOPHIL NFR BLD AUTO: 1 % (ref 0–6)
ERYTHROCYTE [DISTWIDTH] IN BLOOD BY AUTOMATED COUNT: 14.5 % (ref 11.6–15.1)
GFR SERPL CREATININE-BSD FRML MDRD: 126 ML/MIN/1.73SQ M
GLUCOSE SERPL-MCNC: 104 MG/DL (ref 65–140)
HCT VFR BLD AUTO: 39.4 % (ref 34.8–46.1)
HGB BLD-MCNC: 12 G/DL (ref 11.5–15.4)
IMM GRANULOCYTES # BLD AUTO: 0.01 THOUSAND/UL (ref 0–0.2)
IMM GRANULOCYTES NFR BLD AUTO: 0 % (ref 0–2)
INR PPP: 1 (ref 0.84–1.19)
LYMPHOCYTES # BLD AUTO: 2.12 THOUSANDS/ΜL (ref 0.6–4.47)
LYMPHOCYTES NFR BLD AUTO: 55 % (ref 14–44)
MCH RBC QN AUTO: 30 PG (ref 26.8–34.3)
MCHC RBC AUTO-ENTMCNC: 30.5 G/DL (ref 31.4–37.4)
MCV RBC AUTO: 99 FL (ref 82–98)
MONOCYTES # BLD AUTO: 0.2 THOUSAND/ΜL (ref 0.17–1.22)
MONOCYTES NFR BLD AUTO: 5 % (ref 4–12)
NEUTROPHILS # BLD AUTO: 1.49 THOUSANDS/ΜL (ref 1.85–7.62)
NEUTS SEG NFR BLD AUTO: 39 % (ref 43–75)
NRBC BLD AUTO-RTO: 0 /100 WBCS
PLATELET # BLD AUTO: 292 THOUSANDS/UL (ref 149–390)
PMV BLD AUTO: 9.5 FL (ref 8.9–12.7)
POTASSIUM SERPL-SCNC: 3.6 MMOL/L (ref 3.5–5.3)
PROTHROMBIN TIME: 13.3 SECONDS (ref 11.6–14.5)
RBC # BLD AUTO: 4 MILLION/UL (ref 3.81–5.12)
SODIUM SERPL-SCNC: 139 MMOL/L (ref 136–145)
WBC # BLD AUTO: 3.85 THOUSAND/UL (ref 4.31–10.16)

## 2021-06-25 PROCEDURE — 80048 BASIC METABOLIC PNL TOTAL CA: CPT | Performed by: PHYSICIAN ASSISTANT

## 2021-06-25 PROCEDURE — 85610 PROTHROMBIN TIME: CPT | Performed by: PHYSICIAN ASSISTANT

## 2021-06-25 PROCEDURE — 36415 COLL VENOUS BLD VENIPUNCTURE: CPT | Performed by: PHYSICIAN ASSISTANT

## 2021-06-25 PROCEDURE — 85025 COMPLETE CBC W/AUTO DIFF WBC: CPT | Performed by: PHYSICIAN ASSISTANT

## 2021-06-25 PROCEDURE — 99284 EMERGENCY DEPT VISIT MOD MDM: CPT

## 2021-06-25 PROCEDURE — 76856 US EXAM PELVIC COMPLETE: CPT

## 2021-06-25 PROCEDURE — 99024 POSTOP FOLLOW-UP VISIT: CPT | Performed by: OBSTETRICS & GYNECOLOGY

## 2021-06-25 PROCEDURE — 96374 THER/PROPH/DIAG INJ IV PUSH: CPT

## 2021-06-25 PROCEDURE — 85730 THROMBOPLASTIN TIME PARTIAL: CPT | Performed by: PHYSICIAN ASSISTANT

## 2021-06-25 PROCEDURE — 99285 EMERGENCY DEPT VISIT HI MDM: CPT | Performed by: PHYSICIAN ASSISTANT

## 2021-06-25 RX ORDER — KETOROLAC TROMETHAMINE 30 MG/ML
15 INJECTION, SOLUTION INTRAMUSCULAR; INTRAVENOUS ONCE
Status: COMPLETED | OUTPATIENT
Start: 2021-06-25 | End: 2021-06-25

## 2021-06-25 RX ADMIN — KETOROLAC TROMETHAMINE 15 MG: 30 INJECTION, SOLUTION INTRAMUSCULAR at 11:05

## 2021-06-25 NOTE — CONSULTS
Consult - OB/GYN   Iver Session 30 y o  female MRN: [id number]  Unit/Bed#: ED 09 Encounter: [id number]    30 y o  [id number] who is 3 weeks postpartum from a vaginal delivery complicated by postpartum hemorrhage  She is a patient of [facility]    Chief complaint:  Abdominal cramping    HPI:  Patient states she developed intense abdominal cramping yesterday when she was moving around  She states is cramping more intense than what she was used to  She took Motrin to minimal relief  She also noted vaginal bleeding using approximately 2 pads per day  She denies any large clots  She denies any signs of symptomatic anemia including dizziness, lightheadedness, palpitations, or shortness of breath  She reports this morning that the cramping was still present and she proceeded to call for an ambulance to bring her to the ED  Patient reports she has a more out for her arrest so when she made the called by squad she was apprehended by the police  She was very tearful upon examination in the room  She reported slight improvement in her abdominal cramping since arrival in the ED  She denies fever/chills, nausea/vomiting, chest pain, shortness of breath, or calf pain      Active Problems:  Patient Active Problem List   Diagnosis    Previous  delivery, antepartum    Anxiety and depression    History of fetal anomaly in prior pregnancy, currently pregnant    Anxiety during pregnancy, antepartum    Carrier of beta thalassemia    History of premature delivery, currently pregnant, second trimester    Tobacco smoking affecting pregnancy in third trimester    36 weeks gestation of pregnancy     labor in third trimester     (spontaneous vaginal delivery)         PMH:  Past Medical History:   Diagnosis Date    Anemia     on iron    Anxiety and depression 3/17/2020    Arthritis     Right leg    Eating disorder     Migraines     Frequent migraines, seems to affect the left side of her body     Papanicolaou smear for cervical cancer screening 12/27/2019    Post partum depression     Psychiatric disorder     ptsd    Seasonal allergies     Varicella     as a child        PSH:  Past Surgical History:   Procedure Laterality Date    DILATION AND CURETTAGE OF UTERUS         Meds:  Current Facility-Administered Medications on File Prior to Encounter   Medication Dose Route Frequency Provider Last Rate Last Admin    hydroxyprogesterone caproate (BECK) injection 250 mg  250 mg Intramuscular Q7 Days Debbie Amador MD   250 mg at 05/25/21 1049    HYDROXYprogesterone Caproate (BECK) injection 275 mg  275 mg Subcutaneous Q7 Days Jenni Oconnor MD   275 mg at 04/13/21 1030     Current Outpatient Medications on File Prior to Encounter   Medication Sig Dispense Refill    acetaminophen (TYLENOL) 325 mg tablet Take 2 tablets (650 mg total) by mouth every 6 (six) hours as needed for mild pain, moderate pain, severe pain, headaches or fever 30 tablet 0    acetaminophen (TYLENOL) 500 mg tablet Take 1,000 mg by mouth every 8 (eight) hours as needed      ibuprofen (MOTRIN) 600 mg tablet Take 1 tablet (600 mg total) by mouth every 6 (six) hours as needed for moderate pain 30 tablet 0    Prenatal Vit-Fe Fumarate-FA (prenatal multivitamin) 28-0 8 MG TABS Take 1 tablet by mouth daily 100 each 3    doxylamine (UNISON) 25 MG tablet Take 1 tablet (25 mg total) by mouth daily at bedtime as needed for sleep Taking 25 mg hs prn 30 tablet 1    ferrous sulfate 325 (65 Fe) mg tablet Take 325 mg by mouth daily with breakfast       Social hx:  Smoker    Allergies:   Allergies   Allergen Reactions    Other Wheezing     shellfish    Shellfish Allergy - Food Allergy Anxiety, Itching and Swelling       Physical Exam:  /65 (BP Location: Right arm)   Pulse 82   Temp 98 1 °F (36 7 °C) (Oral)   Resp 18   Wt 64 kg (141 lb 1 5 oz)   LMP 10/09/2020   SpO2 98%   BMI 22 78 kg/m²     Physical Exam  Constitutional:       Appearance: Normal appearance  Comments: crying   HENT:      Head: Normocephalic and atraumatic  Pulmonary:      Effort: Pulmonary effort is normal    Abdominal:      General: There is no distension  Palpations: Abdomen is soft  Tenderness: There is no abdominal tenderness  Skin:     General: Skin is warm and dry  Neurological:      Mental Status: She is alert and oriented to person, place, and time  Psychiatric:         Behavior: Behavior normal        Patient declined vaginal and speculum exam as she was in distress at that moment regarding her social situation  Assessment:   30 y o  [id number] 3 weeks postpartum from vaginal delivery presenting with abdominal cramping  Plan:   1  Abdominal cramping   Patient states her mood has improved since arrival to the ED   Given 1 dose of Toradol 15 mg IV   Abdomen is nontender, uterine fundus is firm   Patient declined vaginal speculum exam, bleeding noted to be within normal limits (hemoglobin 12 0)   Follow-up in office for postpartum check    Discussed with Dr Bhumi Gill

## 2021-06-25 NOTE — ED PROVIDER NOTES
History  Chief Complaint   Patient presents with    Vaginal Bleeding     3 weeks ago had a vaginal birth, reports bleeding since  Patient reports today increased bleeding and vaginal pain  1hour PTA patient took ibuprofen      Patient presents emergency by squad  She was placed arrest round  She has outstanding warrants for arrest   She complains of a vaginal bleeding since they viable for 3 weeks ago  She denies any fever chills  She has complains of some lower abdominal tenderness  She denies any dysuria, frequency, urgency  She is bleeding through 2-3 pads per day  She denies any lightheadedness or dizziness  Her past medical history is positive for iron deficiency anemia, anxiety, depression, postpartum depression, eating disorder, migraines, seasonal allergies, varicella  History provided by:  Patient  Vaginal Bleeding  Quality:  Dark red  Severity:  Moderate  Onset quality:  Gradual  Duration:  3 weeks  Timing:  Constant  Progression:  Waxing and waning  Chronicity:  New  Possible pregnancy: no    Context: during urination and spontaneously    Context: not after urination, not at rest, not during intercourse and not genital trauma    Relieved by:  Nothing  Worsened by:  Nothing  Ineffective treatments:  None tried  Associated symptoms: abdominal pain    Associated symptoms: no back pain, no dizziness, no dyspareunia, no dysuria, no fatigue, no fever, no nausea and no vaginal discharge    Risk factors: no bleeding disorder, no hx of ectopic pregnancy, no hx of endometriosis, no gynecological surgery, does not have multiple partners, no new sexual partner, no ovarian cysts, no ovarian torsion, no PID, no prior miscarriage, no STD, no STD exposure, no terminated pregnancies and does not have unprotected sex        Prior to Admission Medications   Prescriptions Last Dose Informant Patient Reported? Taking?    Prenatal Vit-Fe Fumarate-FA (prenatal multivitamin) 28-0 8 MG TABS 6/25/2021 at Unknown time Self No Yes   Sig: Take 1 tablet by mouth daily   acetaminophen (TYLENOL) 325 mg tablet 6/25/2021 at Unknown time  No Yes   Sig: Take 2 tablets (650 mg total) by mouth every 6 (six) hours as needed for mild pain, moderate pain, severe pain, headaches or fever   acetaminophen (TYLENOL) 500 mg tablet 6/25/2021 at Unknown time Self Yes Yes   Sig: Take 1,000 mg by mouth every 8 (eight) hours as needed   doxylamine (UNISON) 25 MG tablet Unknown at Unknown time  No No   Sig: Take 1 tablet (25 mg total) by mouth daily at bedtime as needed for sleep Taking 25 mg hs prn   ferrous sulfate 325 (65 Fe) mg tablet Unknown at Unknown time Self Yes No   Sig: Take 325 mg by mouth daily with breakfast   ibuprofen (MOTRIN) 600 mg tablet 6/25/2021 at Unknown time  No Yes   Sig: Take 1 tablet (600 mg total) by mouth every 6 (six) hours as needed for moderate pain      Facility-Administered Medications Last Administration Doses Remaining   HYDROXYprogesterone Caproate (BECK) injection 275 mg 4/13/2021 10:30 AM    hydroxyprogesterone caproate (BECK) injection 250 mg 5/25/2021 10:49 AM           Past Medical History:   Diagnosis Date    Anemia     on iron    Anxiety and depression 3/17/2020    Arthritis     Right leg    Eating disorder     Migraines     Frequent migraines, seems to affect the left side of her body      Papanicolaou smear for cervical cancer screening 12/27/2019    Post partum depression     Psychiatric disorder     ptsd    Seasonal allergies     Varicella     as a child        Past Surgical History:   Procedure Laterality Date    DILATION AND CURETTAGE OF UTERUS         Family History   Problem Relation Age of Onset    Hypertension Mother     Anemia Mother     Diabetes Mother     ALISON disease Mother     Other Mother         Cervical Disc disorder, Multi nerve injury    Carpal tunnel syndrome Mother     Heart disease Father     Hyperlipidemia Father     Hypertension Maternal Grandmother     Diabetes Maternal Grandmother     Hyperlipidemia Maternal Grandfather     Hypertension Maternal Aunt     Diabetes Maternal Aunt     No Known Problems Sister     No Known Problems Daughter     No Known Problems Son      I have reviewed and agree with the history as documented  E-Cigarette/Vaping    E-Cigarette Use Never User      E-Cigarette/Vaping Substances    Nicotine No     THC No     CBD No     Flavoring No     Other No     Unknown No      Social History     Tobacco Use    Smoking status: Current Some Day Smoker     Packs/day: 0 25     Years: 5 00     Pack years: 1 25     Types: Cigarettes    Smokeless tobacco: Never Used    Tobacco comment: 2 daily   Vaping Use    Vaping Use: Never used   Substance Use Topics    Alcohol use: Never    Drug use: Never       Review of Systems   Constitutional: Negative for activity change, appetite change, chills, fatigue and fever  HENT: Negative for congestion, dental problem, ear discharge, ear pain, mouth sores, postnasal drip, rhinorrhea and sore throat  Eyes: Negative for pain, discharge, redness and itching  Respiratory: Negative for cough, chest tightness and shortness of breath  Cardiovascular: Negative for chest pain  Gastrointestinal: Positive for abdominal pain  Negative for diarrhea, nausea and vomiting  Genitourinary: Positive for vaginal bleeding  Negative for dyspareunia, dysuria, frequency, urgency and vaginal discharge  Musculoskeletal: Negative for back pain  Skin: Negative for color change, pallor and rash  Neurological: Negative for dizziness  Psychiatric/Behavioral: Negative for confusion  All other systems reviewed and are negative  Physical Exam  Physical Exam  Vitals and nursing note reviewed  Constitutional:       General: She is not in acute distress  Appearance: Normal appearance  She is normal weight  She is not ill-appearing, toxic-appearing or diaphoretic     HENT:      Head: Normocephalic and atraumatic  Right Ear: External ear normal       Left Ear: External ear normal    Eyes:      General:         Right eye: No discharge  Left eye: No discharge  Cardiovascular:      Rate and Rhythm: Normal rate and regular rhythm  Heart sounds: No murmur heard  No friction rub  No gallop  Pulmonary:      Effort: Pulmonary effort is normal       Breath sounds: Normal breath sounds  Abdominal:      General: There is no distension  Tenderness: There is no abdominal tenderness  There is no guarding or rebound  Musculoskeletal:      Cervical back: Neck supple  No rigidity or tenderness  Right lower leg: No edema  Left lower leg: No edema  Lymphadenopathy:      Cervical: No cervical adenopathy  Skin:     General: Skin is warm  Capillary Refill: Capillary refill takes less than 2 seconds  Neurological:      General: No focal deficit present  Mental Status: She is alert and oriented to person, place, and time  Psychiatric:         Mood and Affect: Mood normal          Behavior: Behavior normal          Thought Content:  Thought content normal          Judgment: Judgment normal          Vital Signs  ED Triage Vitals   Temperature Pulse Respirations Blood Pressure SpO2   06/25/21 0755 06/25/21 0752 06/25/21 0752 06/25/21 0752 06/25/21 0752   98 1 °F (36 7 °C) 81 16 129/79 100 %      Temp Source Heart Rate Source Patient Position - Orthostatic VS BP Location FiO2 (%)   06/25/21 0755 06/25/21 0752 06/25/21 0752 06/25/21 0752 --   Oral Monitor Lying Right arm       Pain Score       06/25/21 0752       Worst Possible Pain           Vitals:    06/25/21 0830 06/25/21 0900 06/25/21 0930 06/25/21 1030   BP: 128/76 107/67 111/68 119/65   Pulse: 80 88 82 82   Patient Position - Orthostatic VS: Lying Lying Lying Lying         Visual Acuity      ED Medications  Medications   ketorolac (TORADOL) injection 15 mg (15 mg Intravenous Given 6/25/21 1105)       Diagnostic Studies  Results Reviewed     Procedure Component Value Units Date/Time    Basic metabolic panel [[id number]] Collected: 06/25/21 0813    Lab Status: Final result Specimen: Blood from Arm, Right Updated: 06/25/21 0901     Sodium 139 mmol/L      Potassium 3 6 mmol/L      Chloride 103 mmol/L      CO2 24 mmol/L      ANION GAP 12 mmol/L      BUN 7 mg/dL      Creatinine 0 75 mg/dL      Glucose 104 mg/dL      Calcium 8 3 mg/dL      eGFR 126 ml/min/1 73sq m     Narrative:      [place] guidelines for Chronic Kidney Disease (CKD):     Stage 1 with normal or high GFR (GFR > 90 mL/min/1 73 square meters)    Stage 2 Mild CKD (GFR = 60-89 mL/min/1 73 square meters)    Stage 3A Moderate CKD (GFR = 45-59 mL/min/1 73 square meters)    Stage 3B Moderate CKD (GFR = 30-44 mL/min/1 73 square meters)    Stage 4 Severe CKD (GFR = 15-29 mL/min/1 73 square meters)    Stage 5 End Stage CKD (GFR <15 mL/min/1 73 square meters)  Note: GFR calculation is accurate only with a steady state creatinine    Protime-INR [[id number]]  (Normal) Collected: 06/25/21 0813    Lab Status: Final result Specimen: Blood from Arm, Right Updated: 06/25/21 0832     Protime 13 3 seconds      INR 1 00    APTT [[id number]]  (Normal) Collected: 06/25/21 0813    Lab Status: Final result Specimen: Blood from Arm, Right Updated: 06/25/21 0832     PTT 28 seconds     CBC and differential [[id number]]  (Abnormal) Collected: 06/25/21 0813    Lab Status: Final result Specimen: Blood from Arm, Right Updated: 06/25/21 0824     WBC 3 85 Thousand/uL      RBC 4 00 Million/uL      Hemoglobin 12 0 g/dL      Hematocrit 39 4 %      MCV 99 fL      MCH 30 0 pg      MCHC 30 5 g/dL      RDW 14 5 %      MPV 9 5 fL      Platelets 954 Thousands/uL      nRBC 0 /100 WBCs      Neutrophils Relative 39 %      Immat GRANS % 0 %      Lymphocytes Relative 55 %      Monocytes Relative 5 %      Eosinophils Relative 1 %      Basophils Relative 0 %      Neutrophils Absolute 1 49 Thousands/µL      Immature Grans Absolute 0 01 Thousand/uL      Lymphocytes Absolute 2 12 Thousands/µL      Monocytes Absolute 0 20 Thousand/µL      Eosinophils Absolute 0 02 Thousand/µL      Basophils Absolute 0 01 Thousands/µL                  US pelvis complete non OB   ED Interpretation by Yoni Abarca PA-C (06/25 6767)   Thickened heterogeneous endometrium  Retained progressive conception not excluded  Final Result by Soham Acuna MD (06/25 7807)       Thickened heterogeneous endometrium  Retained progressive conception not excluded  Workstation performed: [id number]                    Procedures  Procedures         ED Course  ED Course as of Jun 25 1655 Fri Jun 25, 2021   1101 Patient was seen by the [facility] resident Dr Abran Perry for discharge  MDM  Number of Diagnoses or Management Options  Vaginal bleeding: new and requires workup     Amount and/or Complexity of Data Reviewed  Clinical lab tests: ordered and reviewed  Tests in the radiology section of CPT®: ordered and reviewed  Tests in the medicine section of CPT®: ordered and reviewed    Risk of Complications, Morbidity, and/or Mortality  Presenting problems: high  Diagnostic procedures: high  Management options: high  General comments: Patient presents emergency room by squad with a 3 week history of bleeding since her vaginal delivery  She was seen and examined  Laboratory studies were taken and were reviewed  An ultrasound of her pelvis demonstrated thickened endometrium  Patient was seen by the [facility] resident and Dr Abran Scanlon  She was able to be discharged  She has outstanding warrants and was placed under arrest upon arrival   She was discharged from the department       Patient Progress  Patient progress: stable      Disposition  Final diagnoses:   Vaginal bleeding - Post partum     Time reflects when diagnosis was documented in both MDM as applicable and the Disposition within this note     Time User Action Codes Description Comment    6/25/2021  9:22 AM [place] Nate Add [N93 9] Vaginal bleeding     6/25/2021  9:23 AM [place] Nate Modify [N93 9] Vaginal bleeding Post partum      ED Disposition     ED Disposition Condition Date/Time Comment    Discharge Stable Fri Jun 25, 2021 11:00 AM Jose Francisco Shelton discharge to home/self care  Follow-up Information    None         Discharge Medication List as of 6/25/2021 11:01 AM      CONTINUE these medications which have NOT CHANGED    Details   !! acetaminophen (TYLENOL) 325 mg tablet Take 2 tablets (650 mg total) by mouth every 6 (six) hours as needed for mild pain, moderate pain, severe pain, headaches or fever, Starting Sat 6/5/2021, Normal      !! acetaminophen (TYLENOL) 500 mg tablet Take 1,000 mg by mouth every 8 (eight) hours as needed, Starting Sun 5/31/2020, Historical Med      ibuprofen (MOTRIN) 600 mg tablet Take 1 tablet (600 mg total) by mouth every 6 (six) hours as needed for moderate pain, Starting Sat 6/5/2021, Normal      Prenatal Vit-Fe Fumarate-FA (prenatal multivitamin) 28-0 8 MG TABS Take 1 tablet by mouth daily, Starting Wed 12/9/2020, Normal      doxylamine (UNISON) 25 MG tablet Take 1 tablet (25 mg total) by mouth daily at bedtime as needed for sleep Taking 25 mg hs prn, Starting Tue 6/1/2021, Normal      ferrous sulfate 325 (65 Fe) mg tablet Take 325 mg by mouth daily with breakfast, Historical Med       !! - Potential duplicate medications found  Please discuss with provider  No discharge procedures on file      PDMP Review     None          ED Provider  Electronically Signed by           Sandra Gong PA-C  06/25/21 2167

## 2021-08-02 ENCOUNTER — TELEPHONE (OUTPATIENT)
Dept: OBGYN CLINIC | Facility: CLINIC | Age: 26
End: 2021-08-02

## 2021-08-20 ENCOUNTER — PATIENT OUTREACH (OUTPATIENT)
Dept: FAMILY MEDICINE CLINIC | Facility: CLINIC | Age: 26
End: 2021-08-20

## 2021-08-25 ENCOUNTER — PATIENT OUTREACH (OUTPATIENT)
Dept: OBGYN CLINIC | Facility: CLINIC | Age: 26
End: 2021-08-25

## 2021-08-25 ENCOUNTER — POSTPARTUM VISIT (OUTPATIENT)
Dept: OBGYN CLINIC | Facility: CLINIC | Age: 26
End: 2021-08-25

## 2021-08-25 VITALS
BODY MASS INDEX: 22.02 KG/M2 | WEIGHT: 137 LBS | HEART RATE: 83 BPM | SYSTOLIC BLOOD PRESSURE: 133 MMHG | DIASTOLIC BLOOD PRESSURE: 73 MMHG | HEIGHT: 66 IN

## 2021-08-25 DIAGNOSIS — F41.9 ANXIETY AND DEPRESSION: Primary | ICD-10-CM

## 2021-08-25 DIAGNOSIS — F32.A ANXIETY AND DEPRESSION: Primary | ICD-10-CM

## 2021-08-25 DIAGNOSIS — Z78.9 NEED FOR FOLLOW-UP BY SOCIAL WORKER: ICD-10-CM

## 2021-08-25 PROBLEM — O09.892 HISTORY OF PREMATURE DELIVERY, CURRENTLY PREGNANT, SECOND TRIMESTER: Status: RESOLVED | Noted: 2021-01-29 | Resolved: 2021-08-25

## 2021-08-25 PROBLEM — O09.299 HISTORY OF FETAL ANOMALY IN PRIOR PREGNANCY, CURRENTLY PREGNANT: Status: RESOLVED | Noted: 2020-05-06 | Resolved: 2021-08-25

## 2021-08-25 PROBLEM — O60.03 PRETERM LABOR IN THIRD TRIMESTER: Status: RESOLVED | Noted: 2021-06-03 | Resolved: 2021-08-25

## 2021-08-25 PROBLEM — O99.340 ANXIETY DURING PREGNANCY, ANTEPARTUM: Status: RESOLVED | Noted: 2020-05-29 | Resolved: 2021-08-25

## 2021-08-25 PROBLEM — O99.333 TOBACCO SMOKING AFFECTING PREGNANCY IN THIRD TRIMESTER: Status: RESOLVED | Noted: 2021-03-04 | Resolved: 2021-08-25

## 2021-08-25 PROBLEM — Z3A.36 36 WEEKS GESTATION OF PREGNANCY: Status: RESOLVED | Noted: 2021-04-06 | Resolved: 2021-08-25

## 2021-08-25 PROCEDURE — 99214 OFFICE O/P EST MOD 30 MIN: CPT | Performed by: OBSTETRICS & GYNECOLOGY

## 2021-08-25 RX ORDER — SERTRALINE HYDROCHLORIDE 25 MG/1
25 TABLET, FILM COATED ORAL DAILY
Qty: 30 TABLET | Refills: 3 | Status: SHIPPED | OUTPATIENT
Start: 2021-08-25 | End: 2022-02-07

## 2021-08-25 NOTE — PATIENT INSTRUCTIONS
Postpartum [facility]:  [facility] is a support center designed to promote the physical and mental health of families  [address] delivers personalized and compassionate care that includes prenatal and  classes, Support Groups, Lactation Support Services, Post-Partum Emotional Wellness Services, Activities/Exercise    [address]  [address] [place] [address], [address] [facility]  [phone]  [email or web address]    [address] [facility]  Physical Therapy at [facility] team of trained female therapists offer a wide variety of services designed to address the special healthcare needs of women  Our specially trained clinicians work with you to address your concerns and come beside you to support and encourage you through the healing process  Our offices are designed to keep your sensitive treatments private at all times  We are here to ensure your comfort and mentor you through our pelvic floor therapy programs at your pace  Miguel Ángel garrison    Other Resources for Postpartum [address] [facility]

## 2021-08-25 NOTE — PROGRESS NOTES
Postpartum Visit   [facility]    Assessment/Plan:  Sathya Ramírez is a 30y o  year old [id number] who presents for postpartum visit  Routine Postpartum Care  - Abnormal postpartum exam   - See HPI as below  - Contraception: Nexplanon  - Depression Screen: 0  - Psychosocial support: limited  - Patient Education: Postpartum resources including Pelvic Health PT and Baby & Me  - Pap smear needed at next well woman exam, none on file currently as she had insufficient prenatal care  Due to significant pressing social issues, will defer until she is more stable  - Return in 2 weeks for mood check after starting Zoloft    Additional Problems:  1  Encounter for postpartum visit    2  Postpartum depression  -     sertraline (ZOLOFT) 25 mg tablet; Take 1 tablet (25 mg total) by mouth daily    3  Need for follow-up by       Subjective:     CC: Postpartum visit    Luana Reich is a 30 y o  [id number] who was admitted at 36w6d for  labor, and was noted to be 6/80/-2 in triage  She had insufficient prenatal care  Her pregnancy was complicated by tobacco use,  Beta thalassemia carrier,  labor, prior  delivery at 20 weeks with subsequent IUFD  Amniotomy was performed for clear fluid at 8 cm dilated  She progressed to be complete and started pushing        She delivered a viable male  on 21 at 09:05  Weight 6lbs 11oz via spontaneous vaginal delivery  Apgars were 9 (1 min) and 9 (5 min)   was transferred to  nursery  Patient tolerated the procedure well and was transferred to recovery in stable condition       Her post-partum course was complicated by a postpartum hemorrhage that was controlled with hemabate, methergine, and pitocin  Her post-partum pain was well controlled with oral analgesics  She is approximately 2 months postpartum  Bleeding no bleeding  Bowel function is normal  Bladder function is normal  Patient is sexually active   Contraception method is Nexplanon  She feels as if the area over her Nexplanon is itchy, and she tried putting different ointments and antibiotic creams on it  She also feels like the area over her belly button is itchy  This started when she switched to a new cocoa butter cream  There is a small papular rash over nexplanon site, which is well healed, and is easily palpated through the skin and placed superficially  It looks as if it is been chafing her bra strap  Discussed discontinuation of cocoa butter cream, and utilizing cotton shirts  Postpartum course has been complicated by significant social issues  Patient re-presented to the emergency room by ambulance with cramping approximately 2 weeks postpartum and she was taken into custody at time as she had a warrant out for her arrest    She has difficulty home life situation  She currently lives with her  and their two children,  and her mother lives with her oldest son, a 5year-old  She states that she is trying to get custody back of her oldest child  She states that children and youth were involved with her when they removed him from her custody, but the case is now closed as she has a more stable home life and she does have custody of her other children  She became very tearful during conversation  She states that she takes care of her mother who lymphedema her legs and does her leg treatments  She states that she is not able to walk and down stairs and her mother lives her grandmother  She states that her mother has not been out of the house in over a year and has not seen a doctor  She is worried about her health  I offered that we could have someone go do a wellness to make sure that she is doing okay from a health standpoint  She would like that to be done anonymously, and I discussed that I will talk to Timur Valenzuela, our  to arrange  She wrote down her name and address for me    She states that custody issues with her mother sometimes cause her to think about suicide  She does not have a plan to hurt herself, and she does not actually think she would commit suicide  I asked her if she felt safe at home or if she felt like she would be more safe under psychiatric inpatient care  She states she feels safe at home, and would report to the emergency room should she come up with a plan  She is currently looking for a therapist   She used to go to therapy regularly and liked it, but due to an issue with her  she is unable to return to the same therapist   Kelton Tirado, our  will reach out to her  She states she struggled with postpartum depression with her last delivery, and feels as if she is having down that route again  I discussed starting low-dose Zoloft which she is amenable to  The EDPS form states her score is 0, which I do not believe to be accurate  We spent over 30 minutes discussing her social issues  The following portions of the patient's history were reviewed and updated as appropriate: allergies, current medications, past family history, past medical history, obstetric history, gynecologic history, past social history, past surgical history and problem list        Postpartum Depression: Low Risk     Last EPDS Total Score: 0    Last EPDS Self Harm Result: Never         Objective:  /73   Pulse 83   Ht 5' 5 98" (1 676 m)   Wt 62 1 kg (137 lb)   BMI 22 13 kg/m²   Pregravid Weight/BMI: No episode found (BMI Could not be calculated)  Current Weight: 62 1 kg (137 lb)     General: Well appearing, no distress  Mood and affect: Tearful, overwhelmed  Respiratory: Unlabored breathing  Cardiovascular: Regular rate and rhythm  Abdomen: Soft, nontender  Extremities: Warm and well perfused  Non tender   Small irritation over right arm where nexplanon is placed    Verlena Nails, DO  OB/GYN  5:30 PM

## 2021-09-10 ENCOUNTER — PATIENT OUTREACH (OUTPATIENT)
Dept: OBGYN CLINIC | Facility: CLINIC | Age: 26
End: 2021-09-10

## 2021-09-15 ENCOUNTER — PATIENT OUTREACH (OUTPATIENT)
Dept: OBGYN CLINIC | Facility: CLINIC | Age: 26
End: 2021-09-15

## 2021-09-15 NOTE — PROGRESS NOTES
NAT TELLO spoke with Pt for follow up  Pt reported she very overwhelmed by her spouse health, their financial issues and her mother's health  Pt also has legal issues due to unpaid child support  NAT TELLO asked pt if se was able to contact Mom's PCP and Pt answered no  Pt was advice to contact PCP and request assistance with care management or sending a visiting nurse  Office of aging phone number given too  NAT TELLO provided supportive counseling  Bus ticket was mailed today to assist Pt with transportation for next appointment  ''Pt reported she is seen a therapist and denies SI / HI  Pt will call NAT TELLO at any time needed

## 2021-10-06 ENCOUNTER — PATIENT OUTREACH (OUTPATIENT)
Dept: OBGYN CLINIC | Facility: CLINIC | Age: 26
End: 2021-10-06

## 2021-10-07 ENCOUNTER — OFFICE VISIT (OUTPATIENT)
Dept: INTERNAL MEDICINE CLINIC | Facility: CLINIC | Age: 26
End: 2021-10-07

## 2021-10-07 ENCOUNTER — PATIENT OUTREACH (OUTPATIENT)
Dept: INTERNAL MEDICINE CLINIC | Facility: CLINIC | Age: 26
End: 2021-10-07

## 2021-10-07 VITALS
BODY MASS INDEX: 22.05 KG/M2 | HEART RATE: 77 BPM | WEIGHT: 137.2 LBS | TEMPERATURE: 97.6 F | DIASTOLIC BLOOD PRESSURE: 82 MMHG | OXYGEN SATURATION: 98 % | SYSTOLIC BLOOD PRESSURE: 120 MMHG | HEIGHT: 66 IN

## 2021-10-07 DIAGNOSIS — G47.00 INSOMNIA: ICD-10-CM

## 2021-10-07 DIAGNOSIS — Z72.0 TOBACCO USE: ICD-10-CM

## 2021-10-07 DIAGNOSIS — F32.A ANXIETY AND DEPRESSION: Primary | ICD-10-CM

## 2021-10-07 DIAGNOSIS — R10.9 ABDOMINAL PAIN: Primary | ICD-10-CM

## 2021-10-07 DIAGNOSIS — Z23 NEED FOR INFLUENZA VACCINATION: ICD-10-CM

## 2021-10-07 DIAGNOSIS — F41.9 ANXIETY AND DEPRESSION: Primary | ICD-10-CM

## 2021-10-07 DIAGNOSIS — Z23 NEED FOR PNEUMOCOCCAL VACCINATION: ICD-10-CM

## 2021-10-07 DIAGNOSIS — Z23 ENCOUNTER FOR IMMUNIZATION: ICD-10-CM

## 2021-10-07 PROCEDURE — 90472 IMMUNIZATION ADMIN EACH ADD: CPT

## 2021-10-07 PROCEDURE — 90686 IIV4 VACC NO PRSV 0.5 ML IM: CPT

## 2021-10-07 PROCEDURE — 99204 OFFICE O/P NEW MOD 45 MIN: CPT | Performed by: INTERNAL MEDICINE

## 2021-10-07 PROCEDURE — 90471 IMMUNIZATION ADMIN: CPT

## 2021-10-07 PROCEDURE — 90732 PPSV23 VACC 2 YRS+ SUBQ/IM: CPT

## 2021-10-07 RX ORDER — FAMOTIDINE 20 MG/1
20 TABLET, FILM COATED ORAL 2 TIMES DAILY
Qty: 60 TABLET | Refills: 1 | Status: SHIPPED | OUTPATIENT
Start: 2021-10-07 | End: 2022-02-07

## 2021-11-26 ENCOUNTER — OFFICE VISIT (OUTPATIENT)
Dept: INTERNAL MEDICINE CLINIC | Facility: CLINIC | Age: 26
End: 2021-11-26

## 2021-11-26 ENCOUNTER — TELEPHONE (OUTPATIENT)
Dept: INTERNAL MEDICINE CLINIC | Facility: CLINIC | Age: 26
End: 2021-11-26

## 2021-11-26 VITALS
TEMPERATURE: 98.2 F | WEIGHT: 143.6 LBS | DIASTOLIC BLOOD PRESSURE: 86 MMHG | HEART RATE: 83 BPM | SYSTOLIC BLOOD PRESSURE: 129 MMHG | BODY MASS INDEX: 23.18 KG/M2

## 2021-11-26 DIAGNOSIS — F32.A ANXIETY AND DEPRESSION: Primary | ICD-10-CM

## 2021-11-26 DIAGNOSIS — Z72.0 TOBACCO USE: ICD-10-CM

## 2021-11-26 DIAGNOSIS — F41.9 ANXIETY AND DEPRESSION: Primary | ICD-10-CM

## 2021-11-26 PROCEDURE — 99213 OFFICE O/P EST LOW 20 MIN: CPT | Performed by: INTERNAL MEDICINE

## 2021-11-26 RX ORDER — BUPROPION HYDROCHLORIDE 100 MG/1
100 TABLET ORAL 2 TIMES DAILY
Qty: 60 TABLET | Refills: 1 | Status: SHIPPED | OUTPATIENT
Start: 2021-11-26 | End: 2022-02-07

## 2021-12-28 ENCOUNTER — PATIENT OUTREACH (OUTPATIENT)
Dept: INTERNAL MEDICINE CLINIC | Facility: CLINIC | Age: 26
End: 2021-12-28

## 2022-01-11 ENCOUNTER — PATIENT OUTREACH (OUTPATIENT)
Dept: INTERNAL MEDICINE CLINIC | Facility: CLINIC | Age: 27
End: 2022-01-11

## 2022-01-11 NOTE — PROGRESS NOTES
SW did speak with pt this date to see if she needed any additional help to get into OP [address]  relates she is in [place] at present and does NOT need this help at this time  She further relates she is caring for her  who is very ill  It is very stressful but she his taking it step by step  Support offered  SW encouraged her to f/u with SW if she should need any additional help  Pt agreed to same

## 2022-02-07 ENCOUNTER — OFFICE VISIT (OUTPATIENT)
Dept: INTERNAL MEDICINE CLINIC | Facility: CLINIC | Age: 27
End: 2022-02-07

## 2022-02-07 VITALS
TEMPERATURE: 97.3 F | SYSTOLIC BLOOD PRESSURE: 106 MMHG | WEIGHT: 142 LBS | HEART RATE: 91 BPM | BODY MASS INDEX: 22.92 KG/M2 | DIASTOLIC BLOOD PRESSURE: 65 MMHG

## 2022-02-07 DIAGNOSIS — G47.9 SLEEP DISTURBANCE: ICD-10-CM

## 2022-02-07 DIAGNOSIS — F41.9 ANXIETY AND DEPRESSION: Primary | ICD-10-CM

## 2022-02-07 DIAGNOSIS — F32.A ANXIETY AND DEPRESSION: Primary | ICD-10-CM

## 2022-02-07 DIAGNOSIS — Z01.419 ROUTINE GYNECOLOGICAL EXAMINATION: ICD-10-CM

## 2022-02-07 PROCEDURE — 99214 OFFICE O/P EST MOD 30 MIN: CPT | Performed by: PHYSICIAN ASSISTANT

## 2022-02-07 RX ORDER — BUSPIRONE HYDROCHLORIDE 5 MG/1
5 TABLET ORAL 2 TIMES DAILY
Qty: 60 TABLET | Refills: 2 | Status: SHIPPED | OUTPATIENT
Start: 2022-02-07

## 2022-02-07 RX ORDER — MIRTAZAPINE 7.5 MG/1
7.5 TABLET, FILM COATED ORAL
Qty: 30 TABLET | Refills: 1 | Status: SHIPPED | OUTPATIENT
Start: 2022-02-07

## 2022-02-07 NOTE — PROGRESS NOTES
Assessment/Plan:      Diagnoses and all orders for this visit:    Anxiety and depression  -     busPIRone (BUSPAR) 5 mg tablet; Take 1 tablet (5 mg total) by mouth 2 (two) times a day    Sleep disturbance  -     mirtazapine (REMERON) 7 5 MG tablet; Take 1 tablet (7 5 mg total) by mouth daily at bedtime    Routine gynecological examination  -     Ambulatory Referral to Obstetrics / Gynecology; Future      27y/o female presenting today at request of resident for close f/u to depression/anxiety since being started on bupropion at last appt  Pt admits she never started medication and voices frustration that she has been tried on many different medications for mood in the past none of which have seemed to show any benefit  A lot of time spent with pt today discussing her multiple stressors in her life from past and present, as well as many losses recently over last 2-3 years, and the relationship with her  who also has Asperger's  Pt struggles to find balance in her life and takes on a lot of responsibility in the home caring for her kids and family  Also associated transportation and financial barriers  Much support and sympathy provided to pt today which pt showed much gratitude in the visit  She is agreeable to reaching out to her therapist as she understands importance of regular therapy  She does report it helping though feels the gratification from the sessions doesn't last that long and is easily angered and stressed and emotional  I asked she possibly discuss having more frequent appts with her  Discussed other medication options other that bupropion prescribed at last visit which she reports having before but didn't work  Also seeming to have had multiple other SSRI's and SNRI in past as well  She is agreeable to trying buspar, will start 5mg BID  She does not excessive sleep disturbance issues    After 5-7 days on buspar, if tolerating well, she can start remeron 7 5mg QHS     We briefly discussed ways to approach her partner in difficult situations, trying not use use blaming phrases or anger emotion in conversation as best as possible  Partner would seem to also benefit from [address] as well  She is agreeable to 10 day f/u with me to re-evaluate  She understands to call office sooner if any concerns  I have spent 45 minutes with Patient  today in which greater than 50% of this time was spent in counseling/coordination of care regarding Prognosis, Risks and benefits of tx options, Intructions for management, Risk factor reductions and Impressions  Chief Complaint   Patient presents with    Follow-up     follow up anxiety       Subjective:     Patient ID: Leonora Simmonds is a 27 y o  female  25y/o female presenting today for f/u since being started on bupropion 100mg BID for smoking cessation and depression  Pt reports today she has not had time to p/u script from pharmacy due to caring for her kids and a lot of things going on at home  Pt voices frustration in past due to being tried on multiple different antidepressant medications, none of which have been helpful and is not fond of taking medications  Pt reports continued significant stress in the home  States her relationship with partner has been strained due to multiple reasons, primarily due to his Asperger's  States he does not see therapist or anyone for his disability or MH, as he is also dealing with excessive grief/anger from the loss of their son in 2020 due to health related issues to his heart after delivery  Pt states she herself has also had excessive emotion related to the loss and it was recommended she consider grief groups though pt states that would not be good for her  She also related other losses over past 1-2 years, including a grandparent, as well as her partner's father, whom he is also grieving  She related financial and transportation barriers    She does report getting SNAP benefits but has not applied for [facility] and she does not formula for her child is very expensive  Pt states she feels very overwhelmed caring for her family/children and dealing with the emotional stress of her losses and also helping her   She reports he is not entirely helpful in taking on household responsibilities, though she report feeling sense of guilt when someone else does help  She does have some support from her mother who lives in [place] though doesn't get to see her that often, states they do talk frequently  Pt states she has also helped her partner earlier on in relationship with drugs  Pt also verbalizes that she was in [facility], states she was wrongfully accused and states she is a good person  She is connected with , though states has lost touch with her therapist Ms Miguel Shine  She states she does try to take care of herself with healthy eating habits and staying hydrated  Review of Systems   Constitutional: Negative  Respiratory: Negative  Cardiovascular: Negative  Gastrointestinal: Negative  Genitourinary: Negative  Neurological: Negative  Psychiatric/Behavioral:        As in HPI         The following portions of the patient's history were reviewed and updated as appropriate: allergies, current medications, past family history, past medical history, past social history, past surgical history and problem list       Objective:     Physical Exam  Vitals reviewed  Constitutional:       General: She is not in acute distress  Appearance: Normal appearance  She is not ill-appearing or toxic-appearing  Cardiovascular:      Rate and Rhythm: Normal rate and regular rhythm  Pulmonary:      Effort: Pulmonary effort is normal    Musculoskeletal:      Cervical back: No rigidity  Neurological:      Mental Status: She is alert and oriented to person, place, and time     Psychiatric:         Attention and Perception: Attention and perception normal          Mood and Affect: Mood is depressed  Affect is tearful  Speech: Speech normal          Behavior: Behavior is cooperative  Thought Content: Thought content does not include homicidal or suicidal ideation  Thought content does not include homicidal or suicidal plan           Judgment: Judgment normal          Vitals:    02/07/22 0811   BP: 106/65   BP Location: Right arm   Patient Position: Sitting   Cuff Size: Standard   Pulse: 91   Temp: (!) 97 3 °F (36 3 °C)   TempSrc: Temporal   Weight: 64 4 kg (142 lb)

## 2022-11-09 ENCOUNTER — PROCEDURE VISIT (OUTPATIENT)
Dept: OBGYN CLINIC | Facility: CLINIC | Age: 27
End: 2022-11-09

## 2022-11-09 VITALS — BODY MASS INDEX: 22.11 KG/M2 | SYSTOLIC BLOOD PRESSURE: 110 MMHG | DIASTOLIC BLOOD PRESSURE: 70 MMHG | WEIGHT: 137 LBS

## 2022-11-09 DIAGNOSIS — Z30.46 NEXPLANON REMOVAL: Primary | ICD-10-CM

## 2022-11-09 NOTE — PROGRESS NOTES
Michelle Perea 32year-old here for Nexplanon removal  She had it inserted in 2021  She is family planning  No LMP recorded  Patient has had an implant  ROS:  As indicated in HPI  All other ROS negative  Universal Protocol:  Consent: Verbal consent obtained  Risks and benefits: risks, benefits and alternatives were discussed  Consent given by: patient  Time out: Immediately prior to procedure a "time out" was called to verify the correct patient, procedure, equipment, support staff and site/side marked as required  Timeout called at: 11/9/2022 1:25 PM   Patient understanding: patient states understanding of the procedure being performed  Patient identity confirmed: verbally with patient    Remove and insert drug implant    Date/Time: 11/9/2022 1:25 PM  Performed by: BELL Marvin  Authorized by: BELL Marvin     Indication:     Indication: Presence of non-biodegradable drug delivery implant    Pre-procedure:     Pre-procedure timeout performed: yes      Local anesthetic:  Lidocaine 1%    The site was cleaned and prepped in a sterile fashion: yes    Procedure:     Procedure:  Removal    Small stab incision was made in arm: yes      Left/right:  Left  Comments: The area surrounding the Nexplanon was prepared with iodine  The site was anesthetized with lidocaine  A skin incision was made over the distal aspect of the device  The capsule lysed sharply and the device was removed using a hemostat  Hemostasis was assured  The site closed with surgi-glue and a pressure dressing was applied  The patient tolerated the procedure  well  Follow-up: The patient tolerated the procedure well without complications  Standard post-procedure care is explained and return  precautions are given           Justo Ojeda was seen today for nexplanon removal     Diagnoses and all orders for this visit:    Nexplanon removal  -     Remove and insert drug implant

## 2022-12-31 NOTE — PATIENT INSTRUCTIONS
Thank you for choosing us for your  care today  If you have any questions about your ultrasound or care, please do not hesitate to contact us or your primary obstetrician 
Patient with one or more new problems requiring additional work-up/treatment.

## 2023-03-13 ENCOUNTER — TELEPHONE (OUTPATIENT)
Dept: OBGYN CLINIC | Facility: CLINIC | Age: 28
End: 2023-03-13

## 2023-03-13 NOTE — TELEPHONE ENCOUNTER
Pt confirms lmp 1/31/23, nob appt offered and scheduled  Mychart message sent to pt with nob paperwork  Reviewed injections with Dr Luba Coleman in office, to see pt for NOB appt and to then further discuss with MFM recommendations, pt verbalizes understanding and has no questions at this time

## 2023-03-13 NOTE — TELEPHONE ENCOUNTER
Patient needs a NOB appt LMP: 1/31/23 said she delivered a baby 21 months ago and was taking Vinco injections and is asking if we can accommodate her

## 2023-03-16 ENCOUNTER — HOSPITAL ENCOUNTER (EMERGENCY)
Facility: HOSPITAL | Age: 28
Discharge: HOME | End: 2023-03-16
Attending: EMERGENCY MEDICINE | Admitting: EMERGENCY MEDICINE

## 2023-03-16 VITALS
SYSTOLIC BLOOD PRESSURE: 150 MMHG | TEMPERATURE: 97.9 F | RESPIRATION RATE: 18 BRPM | HEART RATE: 104 BPM | OXYGEN SATURATION: 98 % | DIASTOLIC BLOOD PRESSURE: 97 MMHG

## 2023-03-16 DIAGNOSIS — O41.8X90 SUBCHORIONIC HEMORRHAGE: ICD-10-CM

## 2023-03-16 DIAGNOSIS — O46.8X9 SUBCHORIONIC HEMORRHAGE: ICD-10-CM

## 2023-03-16 DIAGNOSIS — R10.9 ABDOMINAL PAIN: Primary | ICD-10-CM

## 2023-03-16 DIAGNOSIS — Z34.90 INTRAUTERINE PREGNANCY: ICD-10-CM

## 2023-03-16 LAB
BILIRUB UR QL STRIP: NEGATIVE
CLARITY UR: CLEAR
COLOR UR: YELLOW
GLUCOSE UR STRIP-MCNC: NEGATIVE MG/DL
HGB UR QL STRIP.AUTO: NEGATIVE
KETONES UR STRIP-MCNC: NEGATIVE MG/DL
LEUKOCYTE ESTERASE UR QL STRIP: NEGATIVE
NITRITE UR QL STRIP: NEGATIVE
PH UR STRIP.AUTO: 7 [PH] (ref 4.5–8)
PROT UR STRIP-MCNC: NEGATIVE MG/DL
SP GR UR STRIP.AUTO: 1.02 (ref 1–1.03)
UROBILINOGEN UR QL STRIP.AUTO: 1 E.U./DL

## 2023-03-16 NOTE — CASE MANAGEMENT
Case Management ED Assessment    Patient name Frank Thompson  Location ED 18/ED 18 MRN [id number]  : 1995 Date 3/16/2023        OBJECTIVE:  Predictive Model Details         11% Factor Value    Risk of Hospital Admission or ED Visit Model Is in Relationship Yes     Number of ED Visits 1     Has Medicaid Yes     Has Depression Yes     Has PCP Yes          Chief Complaint: Abdominal pain   Patient Class: Emergency  Preferred Pharmacy:   [address] [place] [address] [id number] [place], [address] [facility]  [facility] [address]  Phone: [phone] Fax: [phone]    Primary Care Provider: Mary Jane Adorno DO    Primary Insurance: [facility]  Secondary Insurance:     ED ASSESSMENT:  Enrique Huang Proxies    There are no active Health Care Proxies on file            Outpatient Care Information  Have you seen a doctor in the last year?: Yes       Patient Information  Admitted from[de-identified] Home  Mental Status: Alert  During Assessment patient was accompanied by: Not accompanied during assessment  Assessment information provided by[de-identified] Patient  Primary Caregiver: Self  Support Systems: Spouse/significant other  [facility] Residence: [address] do you live in?: [place]  In the last 12 months, was there a time when you were not able to pay the mortgage or rent on time?: No (lives with family)  In the last 12 months, how many places have you lived?: 1  In the last 12 months, was there a time when you did not have a steady place to sleep or slept in a shelter (including now)?: No  Homeless/housing insecurity resource given?: Refused  Living Arrangements: Lives w/ Spouse/significant other, Lives w/ Extended Family (Lives with  and extended family)    Patient Information Continued  Income Source: Employed  Does patient have prescription coverage?: Yes  Within the past 12 months, you worried that your food would run out before you got the money to buy more : Never true  Within the past 15 months, the food you bought just didn't last and you didn't have money to get more : Never true  Food insecurity resource given?: No  Does patient receive dialysis treatments?: No  Does patient have a history of substance abuse?: No  Does patient have a history of Mental Health Diagnosis?: Yes  Is patient receiving treatment for mental health?: No  Patient declined treatment information    Has patient received inpatient treatment related to mental health in the last 2 years?: No

## 2023-03-16 NOTE — DISCHARGE INSTRUCTIONS
Please see an OBGYN Doctor to follow up after this visit       Return to the ER if any worsening symptoms or vaginal bleeding

## 2023-03-16 NOTE — ED ATTENDING ATTESTATION
3/16/2023  IKhushi MD, saw and evaluated the patient  I have discussed the patient with the resident/non-physician practitioner and agree with the resident's/non-physician practitioner's findings, Plan of Care, and MDM as documented in the resident's/non-physician practitioner's note, except where noted  All available labs and Radiology studies were reviewed  I was present for key portions of any procedure(s) performed by the resident/non-physician practitioner and I was immediately available to provide assistance  At this point I agree with the current assessment done in the Emergency Department  I have conducted an independent evaluation of this patient a history and physical is as follows: This is a 18-year-old female presenting to the emergency department because she is feeling overwhelmed  Patient states that she has been having some aching pain in her lower abdomen  No vomiting, diarrhea  Had a positive pregnancy test a week ago  Last menstrual period was end of January  No fertility drug use  No vaginal bleeding  No unilateral pain  Was not maximal in onset  Review of systems negative and 12 systems reviewed  On exam vital signs were reviewed  Patient is awake, alert, interactive  The patient's pupils are equally round reactive to light  Oropharynx is clear with moist mucous membranes  Neck is supple and nontender with no adenopathy or JVD  Heart is regular with no murmurs, rubs, or gallops  Lungs are clear and equal with no wheezes, rales, or rhonchi  Abdomen is soft and nontender with no masses, rebound, or guarding  There is no CVA tenderness  The patient was completely exposed  There is no skin breakdown  There are no rashes or skin changes  Extremities are warm and well perfused with good pulses  The patient has normal strength, sensation, and cranial nerves  Urine reviewed, no evidence of infection  Patient with IUP confirmed on bedside ultrasound    Will offer patient crisis, no SI or HI at this time    Discharged to follow-up with diagnosis of 1 abdominal pain, 2 intrauterine pregnancy  ED Course         Critical Care Time  Procedures

## 2023-03-16 NOTE — ED PROVIDER NOTES
Chief Complaint   Patient presents with   • Abdominal Pain Pregnant     Pt states she took an at home pregnancy test last Thursday, which resulted as positive  Pt c/o abdominal pain since Monday  History of Present Illness and Review of Systems   This is a 32 y o  female with PMH significant for migraines, retained products of conception coming in today with complaint of abdominal pain  She reports the pain started 2 days ago, no inciting event or injury  Describes a dull, full sensation in the lower abdomen  It has been persistent, and actually improved today  She reports she thinks she is pregnant  LMP was   Denies any vaginal bleeding or discharge  She is  based on this pregnancy (1 miscarriage)  Denies any birth control or fertility treatments  She does have a history of retained POC remotely  Denies any other systemic symptoms  She also reports feeling overwhelmed and quite anxious regarding her social circumstances and the fact that she may be pregnant  Reports she vapes, no other drug use  No other symptoms currently  Remainder of ROS Reviewed and Non-Pertinent    No other complaints for this encounter     - No language barrier    - History obtained from patient and chart   - Reviewed relevant past medical/family/social history  - There are no limitations to the history obtained  Past Medical, Past Surgical History:    has a past medical history of Anemia, Anxiety and depression (3/17/2020), Arthritis, Eating disorder, Migraines, Papanicolaou smear for cervical cancer screening (2019), Post partum depression, Psychiatric disorder, Seasonal allergies,  (spontaneous vaginal delivery) (6/3/2021), and Varicella  has a past surgical history that includes Dilation and curettage of uterus  Allergies:      Allergies   Allergen Reactions   • Other Wheezing     shellfish   • Shellfish Allergy - Food Allergy Anxiety, Itching and Swelling       Social and Family History: Social History     Substance and Sexual Activity   Alcohol Use Never     Social History     Tobacco Use   Smoking Status Some Days   • Packs/day: 0 25   • Years: 5 00   • Pack years: 1 25   • Types: Cigarettes   Smokeless Tobacco Never   Tobacco Comments    Quit smoking on june 3rd when my son turned 1     Social History     Substance and Sexual Activity   Drug Use Never       Physical Examination     Vitals:    03/16/23 1407 03/16/23 1408   BP: 150/97    BP Location: Right arm    Pulse: (!) 119 104   Resp: 18    Temp: 97 9 °F (36 6 °C)    TempSrc: Temporal    SpO2: 98%      Physical Exam  Vitals and nursing note reviewed  Constitutional:       General: She is not in acute distress  Appearance: She is well-developed  HENT:      Head: Normocephalic and atraumatic  Eyes:      Conjunctiva/sclera: Conjunctivae normal    Cardiovascular:      Rate and Rhythm: Normal rate and regular rhythm  Heart sounds: No murmur heard  Pulmonary:      Effort: Pulmonary effort is normal  No respiratory distress  Breath sounds: Normal breath sounds  Abdominal:      Palpations: Abdomen is soft  Tenderness: There is no abdominal tenderness  There is no guarding or rebound  Comments: Umbillical hernia present, easily reducible, no overlying tenderness or skin color changes   Musculoskeletal:         General: No swelling  Cervical back: Neck supple  Skin:     General: Skin is warm and dry  Capillary Refill: Capillary refill takes less than 2 seconds  Neurological:      General: No focal deficit present  Mental Status: She is alert     Psychiatric:         Mood and Affect: Mood normal             Risk Stratification Tools                Orders Placed This Encounter   Procedures   • Pelvic Ultrasound   • Urine culture       Labs:     Labs Reviewed   URINE CULTURE   URINE MACROSCOPIC, POC       Result Value Ref Range Status    Color, UA Yellow   Final    Clarity, UA Clear   Final    pH, UA 7 0  4 5 - 8 0 Final    Leukocytes, UA Negative  Negative Final    Nitrite, UA Negative  Negative Final    Protein, UA Negative  Negative mg/dl Final    Glucose, UA Negative  Negative mg/dl Final    Ketones, UA Negative  Negative mg/dl Final    Urobilinogen, UA 1 0  0 2, 1 0 E U /dl E U /dl Final    Bilirubin, UA Negative  Negative Final    Occult Blood, UA Negative  Negative Final    Specific Gravity, UA 1 025  1 003 - 1 030 Final    Narrative:     CLINITEK RESULT       Imaging:     No orders to display          Procedures   POC Pelvic US    Date/Time: 3/16/2023 2:30 PM  Performed by: Miguelina Shelton MD  Authorized by: Miguelina Shelton MD     Patient location:  ED  Other Assisting Provider: Yes (comment) Emy Tejada)    Procedure details:     Exam Type:  Diagnostic    Indications: evaluate for IUP      Assessment for: determine estimated gestational age and evaluate fetal viability      Technique:  Transabdominal obstetric (HCG+) exam    Views obtained: uterus (transverse and sagittal)      Image quality: diagnostic      Image availability:  Images available in PACS  Uterine findings:     Endometrial stripe: identified      Intrauterine pregnancy: identified      Single gestation: identified      Gestational sac: identified      Yolk sac: identified      Fetal pole: identified      Fetal heart rate: identified      Fetal heart rate (bpm):  145  Interpretation:     Ultrasound impressions: normal      Pregnancy findings: intrauterine pregnancy (IUP)    Comments:      Subchorionic hemorrhage noted, low lying IUP as well          MDM:   Medical Decision Making  Luciana Woods is a 32 y o  who presents with complaints of abdominal pain    Vital signs are stable, physical exam shows the pt is well appearing, anxious during the interview, no abdominal tenderness to palpation, benign cardirespiratory exam    Ddx: Overall appears consistent with intrauterine pregnancy versus ectopic pregnancy versus UTI versus other  Plan: UPT, UA, bedside ultrasound, will reassess, will discuss with crisis as well  Summary: Overall consistent with an IUP and HR visualized on Bedside US  Additionally did appear to have subchorionic hemorrhage as well  The gestational sac and fetus did appear to be lower lying than anticipated  Given this, recommend she obtain katz follow up with OBGYN as she is at higher risk for miscarriage  Upon telling her this, the pt became quite agitated, shouting at me personally, and then left without the ability to provide OBGYN referral although she previously had said she would be able to see someone (before she became upset)  Advised on close follow up and [address] without further complication  Final Dispo   Final Diagnosis:  1  Abdominal pain    2  Intrauterine pregnancy    3  Subchorionic hemorrhage      Time reflects when diagnosis was documented in both MDM as applicable and the Disposition within this note     Time User Action Codes Description Comment    3/16/2023  3:20 PM Anna Essex Add [R10 9] Abdominal pain     3/16/2023  3:20 PM Anna Essex Add [Z33 1] IUP (intrauterine pregnancy), incidental     3/16/2023  3:20 PM Anna Essex Remove [Z33 1] IUP (intrauterine pregnancy), incidental     3/16/2023  3:21 PM Anna Essex Add [W11 51] Intrauterine pregnancy     3/16/2023  3:21 PM [facility] [[id number],  [address] hemorrhage       ED Disposition     ED Disposition   Discharge    Condition   Stable    Date/Time   Thu Mar 16, 2023  3:36 PM    Comment   Olayinka Fish discharge to home/self care                 Follow-up Information     Follow up With Specialties Details Why Contact Info Additional [address] Emergency Department Emergency Medicine  If symptoms worsen [address] Emergency Department, [address] [facility], [place], [address]   [phone]        Medications - No data to display    All details of the evaluation and treatment plan were made clear and additionally all questions and concerns were addressed while under my care  Portions of the record may have been created with voice recognition software  Occasional wrong word or "sound a like" substitutions may have occurred due to the inherent limitations of voice recognition software  Read the chart carefully and recognize, using context, where substitutions have occurred  The attending physician physically available and evaluated the above patient alongside myself          Malou Lim MD  03/16/23 2031

## 2023-03-16 NOTE — CASE MANAGEMENT
Case Management ED Discharge Planning Note    Patient name Javier Kayser  Location ED 18/ED 18 MRN [id number]  : 1995 Date 3/16/2023        OBJECTIVE:  Predictive Model Details         11% Factor Value    Risk of Hospital Admission or ED Visit Model Is in Relationship Yes     Number of ED Visits 1     Has Medicaid Yes     Has Depression Yes     Has PCP Yes       Chief Complaint: Abdominal pain   Patient Class: Emergency  Preferred Pharmacy:   [address] [place] [address] [address] [place] [address]  Phone: [phone] Fax: [phone]    Primary Care Provider: Mirna Craig DO    Primary Insurance: Kiana Luther  Secondary Insurance:     ED Discharge Details:    Discharge planning discussed with[de-identified] Patient  Freedom of Choice: Yes     Other Referral/Resources/Interventions Provided:  Interventions: Other (Specify), Declines resources (OP MH, pregnancy resources, housing resources)  Referral Comments: Pt requested preganncy resouced, long term housing resources, and OP MH resouces from CM  CM brought resources to bedside, pt declined all resources  Treatment Team Recommendation: Home  Discharge Destination Plan[de-identified] Home     Transport at Discharge : Self      Additional Comments: CM was consulted by ED RN requesting CM see pt for supportive resources  At bedside pt was tearful and opened up to CM about recent losses in their family including the loss of a child in   Pt told CM that there are many stressors in her life including financial, marital, and   Pt states that right now she works but her  does not  They currently live with family but she states it is not a supportive environment and she wants to move  Pt states she has lived in a shelter before with her  and 1 child  Pt's oldest child resides with pt's mother    After speaking with pt CM was asked for OP MH resources, housing resources, and supportive pregnancy programs  CM obtainted the following resources: OP , "[employer] [facility]", "[facility] [address] [facility]", "Women, Infants [facility] Children ([address])", and "[facility]"  CM and ED resident entered pt's room at the same time, resident provided pt with update and told pt that she was bleeding but currently the pregnancy looks healthy  Pt voiced frustration and dissatisfaction with the conversation  Pt states that the same thing is happening with this pregnancy that happened with the pregnancy she lost in 2020 and she knows she is going to lose the pregnancy  ED resident attempted to review with pt that at this time the pregnancy looks healthy however pt became angrier and asked to leave the hospital and for resident to leave the room/stop talking  After the interaction with ED resident pt refused all resources from CM and would not speak with  CM further

## 2023-03-27 ENCOUNTER — TELEPHONE (OUTPATIENT)
Dept: OBGYN CLINIC | Facility: CLINIC | Age: 28
End: 2023-03-27

## 2023-03-27 DIAGNOSIS — R39.15 URINARY URGENCY: Primary | ICD-10-CM

## 2023-03-27 NOTE — TELEPHONE ENCOUNTER
Patient called, she is pregnant and has a NOB on 4/5  Patient said she has had blood discharge for 2 weeks but it is resolved  Patient is having pressure and urgency  Patient is not having cramping or single sided pain  On call provider aware and recommending a UA and a UC  Patient aware to complete those labs and we will be in touch once we get those results

## 2023-03-31 ENCOUNTER — HOSPITAL ENCOUNTER (EMERGENCY)
Facility: HOSPITAL | Age: 28
Discharge: HOME | End: 2023-03-31
Attending: EMERGENCY MEDICINE

## 2023-03-31 VITALS
OXYGEN SATURATION: 100 % | RESPIRATION RATE: 18 BRPM | TEMPERATURE: 98.9 F | HEART RATE: 67 BPM | SYSTOLIC BLOOD PRESSURE: 136 MMHG | DIASTOLIC BLOOD PRESSURE: 64 MMHG

## 2023-03-31 DIAGNOSIS — O26.899 ABDOMINAL PAIN IN PREGNANCY: Primary | ICD-10-CM

## 2023-03-31 DIAGNOSIS — N93.9 VAGINAL BLEEDING: ICD-10-CM

## 2023-03-31 DIAGNOSIS — R10.9 ABDOMINAL PAIN IN PREGNANCY: Primary | ICD-10-CM

## 2023-03-31 DIAGNOSIS — O20.0 THREATENED MISCARRIAGE: ICD-10-CM

## 2023-03-31 LAB
B-HCG SERPL-ACNC: ABNORMAL MIU/ML
BILIRUB UR QL STRIP: NEGATIVE
CLARITY UR: CLEAR
COLOR UR: YELLOW
GLUCOSE UR STRIP-MCNC: NEGATIVE MG/DL
HGB UR QL STRIP.AUTO: NEGATIVE
KETONES UR STRIP-MCNC: NEGATIVE MG/DL
LEUKOCYTE ESTERASE UR QL STRIP: NEGATIVE
NITRITE UR QL STRIP: NEGATIVE
PH UR STRIP.AUTO: 7 [PH] (ref 4.5–8)
PROT UR STRIP-MCNC: NEGATIVE MG/DL
SP GR UR STRIP.AUTO: 1.02 (ref 1–1.03)
UROBILINOGEN UR QL STRIP.AUTO: 0.2 E.U./DL

## 2023-03-31 RX ORDER — ACETAMINOPHEN 325 MG/1
650 TABLET ORAL ONCE
Status: COMPLETED | OUTPATIENT
Start: 2023-03-31 | End: 2023-03-31

## 2023-03-31 RX ADMIN — ACETAMINOPHEN 650 MG: 325 TABLET ORAL at 14:44

## 2023-03-31 NOTE — ED ATTENDING ATTESTATION
Final Diagnoses:     1  Abdominal pain in pregnancy    2  Vaginal bleeding    3  Left against medical advice      ED Course as of 23 1528   Fri Mar 31, 2023   1528 Wants to leave before workup completed b/c has to  kids  She will return for re-evaluation  Mariela Schuler MD, saw and evaluated the patient  All available labs and X-rays were ordered by me or the resident and have been reviewed by myself  I discussed the patient with the resident / non-physician and agree with the resident's / non-physician practitioner's findings and plan as documented in the resident's / non-physician practicitioner's note, except where noted  At this point, I agree with the current assessment done in the ED  I was present during key portions of all procedures performed unless otherwise stated  Nursing Triage:     Chief Complaint   Patient presents with   • Vaginal Pain   • Abdominal Pain Pregnant     Pt states that she is approx 8 weeks and 4 days pregnant with abd pain and bloody discharge  HPI:   This is a 32 y o  female presenting for evaluation of for trimester vaginal bleeding  The patient believes that she is approximately 8 weeks pregnant  She is G6, P5  She states that she had vaginal spotting, then found out she was pregnant, then had it again  Since that second episode of spotting though she has had nothing  She did have some lower abdominal discomfort in the suprapubic region focally  Because of this she came in for evaluation  Denies any fevers chills  Has nausea and nonbloody nonbilious emesis every now and then in the mornings only  No chest pain or shortness of breath  No dizziness or lightheadedness  No diarrhea or constipation  No flank pain  One of her pregnancies was complicated by some type of cardiomyopathy in the     ASSESSMENT + PLAN:   Lower belly tenderness, pregnant    A:  - First trimester vaginal bleeding  P:  - As patient has no confirmed IUP and is pregnant given the urine pregnancy test, will do beta-HCG  - Will do U/S  - Attempted transabdominal U/S: couldn't find IUP  - Reviewed with patient that first trimester bleeding occurs in 20% of women  Reviewed that of these 20%, 50% will miscarry, 50% will not miscarry  - Lastly, reviewed with patient that if she has an IUP with a FHR, this drops the 50% miscarriage rate to only 1%  - Review of EPIC --> The patient has a documented Rh documented for possible RhoGam  0   Lab Value Date/Time    RH Positive 06/03/2021 0534   - If no obvious pregnancy on U/S, will discuss 48-hour beta-HCG testing for trending  Physical:   General: VS reviewed  Appears in NAD  awake, alert  Well-nourished, well-developed  Appears stated age  Speaking normally in full sentences  Head: Normocephalic, atraumatic  Eyes: EOM-I  No diplopia  No hyphema  No subconjunctival hemorrhages  Symmetrical lids  ENT: Atraumatic external nose and ears  MMM  No malocclusion  No stridor  Normal phonation  No drooling  Normal swallowing  Neck: No JVD  CV: No pallor noted  Lungs:   No tachypnea  No respiratory distress  Abd: soft lower belly pain but focal in the suprapubpic region  Non-tender in the RLQ  Non-tender in the LLQ  No CVAT    nd no rebound/guarding  MSK:   FROM spontaneously  Skin: Dry, intact  Neuro: Awake, alert, GCS15, CN II-XII grossly intact  Motor grossly intact  Psychiatric/Behavioral: interacting normally; appropriate mood/affect   Exam: deferred    Vitals:    03/31/23 1403   BP: 136/64   BP Location: Right arm   Pulse: 67   Resp: 18   Temp: 98 9 °F (37 2 °C)   TempSrc: Oral   SpO2: 100%     - There are no obvious limitations to social determinants of care  - Nursing note reviewed  - Vitals reviewed  - Orders placed by myself and/or advanced practitioner / resident     - Previous chart was reviewed  - No language barrier    - History obtained from patient      - There are no limitations to the history obtained:     Past Medical:    has a past medical history of Anemia, Anxiety and depression (3/17/2020), Arthritis, Eating disorder, Migraines, Papanicolaou smear for cervical cancer screening (2019), Post partum depression, Psychiatric disorder, Seasonal allergies,  (spontaneous vaginal delivery) (6/3/2021), and Varicella  Past Surgical:    has a past surgical history that includes Dilation and curettage of uterus  Social:     Social History     Substance and Sexual Activity   Alcohol Use Never     Social History     Tobacco Use   Smoking Status Some Days   • Packs/day: 0 25   • Years: 5 00   • Pack years: 1 25   • Types: Cigarettes   Smokeless Tobacco Never   Tobacco Comments    Quit smoking on  when my son turned 1     Social History     Substance and Sexual Activity   Drug Use Never       Echo:   No results found for this or any previous visit  No results found for this or any previous visit  Cath:    No results found for this or any previous visit        Code Status: Prior  Advance Directive and Living Will:      Power of :    POLST:    Medications   acetaminophen (TYLENOL) tablet 650 mg (650 mg Oral Given 3/31/23 1444)     No orders to display     Orders Placed This Encounter   Procedures   • Urine culture   • hCG, quantitative   • Urine dip analyzer     Labs Reviewed   URINE CULTURE   HCG, QUANTITATIVE   URINE MACROSCOPIC, POC       Result Value Ref Range Status    Color, UA Yellow   Final    Clarity, UA Clear   Final    pH, UA 7 0  4 5 - 8 0 Final    Leukocytes, UA Negative  Negative Final    Nitrite, UA Negative  Negative Final    Protein, UA Negative  Negative mg/dl Final    Glucose, UA Negative  Negative mg/dl Final    Ketones, UA Negative  Negative mg/dl Final    Urobilinogen, UA 0 2  0 2, 1 0 E U /dl E U /dl Final    Bilirubin, UA Negative  Negative Final    Occult Blood, UA Negative  Negative Final    Specific Gravity, UA 1 025  1 003 - 1 030 Final    Narrative:     CLINITEK RESULT     Time reflects when diagnosis was documented in both MDM as applicable and the Disposition within this note     Time User Action Codes Description Comment    3/31/2023  3:20 PM Lauro Burk Add [O26 899,  R10 9] Abdominal pain in pregnancy     3/31/2023  3:20 PM Lauro Burk Add [N93 9] Vaginal bleeding     3/31/2023  3:28 PM Charlene Meadows Add [Z53 29] Left against medical advice       ED Disposition     ED Disposition   AMA    Condition   --    Date/Time   Fri Mar 31, 2023  3:21 PM    Comment   Date: 3/31/2023  Patient: Bernadette Najera  Admitted: 3/31/2023  2:17 PM  Attending Provider: Kika Welch MD    Bernadette Najera or her authorized caregiver has made the decision for the patient to leave the emergency department against the  advice of her attending physician and resident  She or her authorized caregiver has been informed and understands the inherent risks, including death, permanent disability, missed injury  She or her authorized caregiver has decided to accept the re sponsibility for this decision  Bernadette Najera and all necessary parties have been advised that she may return for further evaluation or treatment  Her condition at time of discharge was stable    Bernadette Najera had current vital signs as follow s:  /64 (BP Location: Right arm)   Pulse 67   Temp 98 9 °F (37 2 °C) (Oral)   Resp 18   LMP 01/31/2023 (Exact Date)            Follow-up Information     Follow up With Specialties Details Why Contact Info Additional Information    [employer]   For Emergency Department Follow-up [phone]       [address] [place] [address] Emergency Department Emergency Medicine  If symptoms worsen [address] [phone]  [address] Emergency Department, [address], [place], [address] [facility], [address] [phone]        Patient's Medications    No medications on file "    No discharge procedures on file  Prior to Admission Medications   Prescriptions: None   Facility-Administered Medications Last Administration Doses Remaining   HYDROXYprogesterone Caproate [facility] ) injection 275 mg 4/13/2021 10:30 AM    hydroxyprogesterone caproate (BECK) injection 250 mg 5/25/2021 10:49 AM                            Portions of the record may have been created with voice recognition software  Occasional wrong word or \"sound a like\" substitutions may have occurred due to the inherent limitations of voice recognition software  Read the chart carefully and recognize, using context, where substitutions have occurred      Electronically signed by:  Carli Rudd    "

## 2023-03-31 NOTE — ED NOTES
"Pt rang call bell stating \"I have to leave I have to  my kids, I didn't realize it was 3 already\" provider updated and at bedside     Niko Higgins RN  03/31/23 1810    "

## 2023-03-31 NOTE — Clinical Note
Date: 3/31/2023  Patient: Sanam Ruiz  Admitted: 3/31/2023  2:17 PM  Attending Provider: Denver Patterson MD    Sanam Ruiz or her authorized caregiver has made the decision for the patient to leave the emergency department against the  advice of her attending physician and resident  She or her authorized caregiver has been informed and understands the inherent risks, including death, permanent disability, missed injury  She or her authorized caregiver has decided to accept the re sponsibility for this decision  Sanam Ruiz and all necessary parties have been advised that she may return for further evaluation or treatment  Her condition at time of discharge was stable    Sanam Ruiz had current vital signs as follow s:  /64 (BP Location: Right arm)   Pulse 67   Temp 98 9 °F (37 2 °C) (Oral)   Resp 18   LMP 01/31/2023 (Exact Date)

## 2023-03-31 NOTE — Clinical Note
Garry Ewing was seen and treated in our emergency department on 3/31/2023  Diagnosis:     Arnav Urbina  may return to work on return date, is off the rest of the shift today  She may return on this date: 04/02/2023         If you have any questions or concerns, please don't hesitate to call        Linda Mason DO    ______________________________           _______________          _______________  Hospital Representative                              Date                                Time

## 2023-04-01 LAB — BACTERIA UR CULT: NORMAL

## 2023-04-01 NOTE — ED PROVIDER NOTES
"History  Chief Complaint   Patient presents with   • Vaginal Pain   • Abdominal Pain Pregnant     Pt states that she is approx 8 weeks and 4 days pregnant with abd pain and bloody discharge  HPI    33-year-old female, G6, P5, approximately 8 weeks pregnant, presenting for evaluation of first trimester vaginal bleeding  Over the past several weeks, patient has had several episodes of vaginal spotting with some suprapubic discomfort  Vaginal spotting has since resolved but patient came to the emergency department for evaluation to [facility] sure the baby was okay\"  Is associated with some nausea and vomiting but only in the mornings  Denies any dizziness, lightheadedness, diarrhea, constipation, fever, chills, flank pain  Prior to Admission Medications   Prescriptions: None   Facility-Administered Medications Last Administration Doses Remaining   HYDROXYprogesterone Caproate [facility] ) injection 275 mg 2021 10:30 AM    hydroxyprogesterone caproate (BECK) injection 250 mg 2021 10:49 AM           Past Medical History:   Diagnosis Date   • Anemia     on iron   • Anxiety and depression 3/17/2020   • Arthritis     Right leg   • Eating disorder    • Migraines     Frequent migraines, seems to affect the left side of her body     • Papanicolaou smear for cervical cancer screening 2019   • Post partum depression    • Psychiatric disorder     ptsd   • Seasonal allergies    •  (spontaneous vaginal delivery) 6/3/2021   • Varicella     as a child        Past Surgical History:   Procedure Laterality Date   • DILATION AND CURETTAGE OF UTERUS         Family History   Problem Relation Age of Onset   • Hypertension Mother    • Anemia Mother    • Diabetes Mother    • ALISON disease Mother    • Other Mother         Cervical Disc disorder, Multi nerve injury   • Carpal tunnel syndrome Mother    • Heart disease Father    • Hyperlipidemia Father    • Hypertension Maternal Grandmother    • Diabetes Maternal Grandmother " • Hyperlipidemia Maternal Grandfather    • Hypertension Maternal Aunt    • Diabetes Maternal Aunt    • No Known Problems Sister    • No Known Problems Daughter    • No Known Problems Son      I have reviewed and agree with the history as documented  E-Cigarette/Vaping   • E-Cigarette Use Never User      E-Cigarette/Vaping Substances   • Nicotine No    • THC No    • CBD No    • Flavoring No    • Other No    • Unknown No      Social History     Tobacco Use   • Smoking status: Some Days     Packs/day: 0 25     Years: 5 00     Pack years: 1 25     Types: Cigarettes   • Smokeless tobacco: Never   • Tobacco comments:     Quit smoking on june 3rd when my son turned 1   Vaping Use   • Vaping Use: Never used   Substance Use Topics   • Alcohol use: Never   • Drug use: Never        Review of Systems   Constitutional: Negative for chills and fever  HENT: Negative for sore throat  Respiratory: Negative for cough and shortness of breath  Cardiovascular: Negative for chest pain, palpitations and leg swelling  Gastrointestinal: Positive for abdominal pain, nausea and vomiting  Negative for diarrhea  Genitourinary: Positive for vaginal bleeding  Negative for dysuria and frequency  Musculoskeletal: Negative for myalgias  Skin: Negative for rash and wound  Neurological: Negative for dizziness, light-headedness and headaches  All other systems reviewed and are negative  Physical Exam  ED Triage Vitals [03/31/23 1403]   Temperature Pulse Respirations Blood Pressure SpO2   98 9 °F (37 2 °C) 67 18 136/64 100 %      Temp Source Heart Rate Source Patient Position - Orthostatic VS BP Location FiO2 (%)   Oral Monitor Lying Right arm --      Pain Score       2             Orthostatic Vital Signs  Vitals:    03/31/23 1403   BP: 136/64   Pulse: 67   Patient Position - Orthostatic VS: Lying       Physical Exam  Vitals and nursing note reviewed  Constitutional:       General: She is not in acute distress  Appearance: Normal appearance  She is not ill-appearing or toxic-appearing  HENT:      Head: Normocephalic and atraumatic  Right Ear: External ear normal       Left Ear: External ear normal       Nose: Nose normal       Mouth/Throat:      Mouth: Mucous membranes are moist       Pharynx: Oropharynx is clear  Eyes:      General: No scleral icterus  Extraocular Movements: Extraocular movements intact  Cardiovascular:      Rate and Rhythm: Normal rate and regular rhythm  Pulses: Normal pulses  Pulmonary:      Effort: Pulmonary effort is normal  No respiratory distress  Breath sounds: Normal breath sounds  Abdominal:      Palpations: Abdomen is soft  Tenderness: There is no abdominal tenderness  Musculoskeletal:         General: Normal range of motion  Cervical back: Normal range of motion and neck supple  Skin:     General: Skin is warm  Capillary Refill: Capillary refill takes less than 2 seconds  Neurological:      General: No focal deficit present  Mental Status: She is alert and oriented to person, place, and time           ED Medications  Medications   acetaminophen (TYLENOL) tablet 650 mg (650 mg Oral Given 3/31/23 1444)       Diagnostic Studies  Results Reviewed     Procedure Component Value Units Date/Time    Urine culture [[id number]] Collected: 03/31/23 1449    Lab Status: Final result Specimen: Urine, Other Updated: 04/01/23 1347     Urine Culture 50,000-59,000 cfu/ml    Narrative:      Resembling mixed skin and/or urogenital mj      hCG, quantitative [[id number]]  (Abnormal) Collected: 03/31/23 1448    Lab Status: Final result Specimen: Blood from Arm, Left Updated: 03/31/23 1538     HCG, Quant 110,971 mIU/mL     Narrative:       Expected Ranges:     Approximate               Approximate HCG  Gestation age          Concentration ( mIU/mL)  _____________          ______________________   Vasyl Rayokin                      HCG values  0 2-1 5-50  1-2                           2-3                         100-5000  3-4                         500-19429  4-5                         1000-58360  5-6                         92820-445737  6-8                         30143-088389  8-12                        73623-299227      Urine Macroscopic, POC [[id number]] Collected: 03/31/23 1449    Lab Status: Final result Specimen: Urine Updated: 03/31/23 1451     Color, UA Yellow     Clarity, UA Clear     pH, UA 7 0     Leukocytes, UA Negative     Nitrite, UA Negative     Protein, UA Negative mg/dl      Glucose, UA Negative mg/dl      Ketones, UA Negative mg/dl      Urobilinogen, UA 0 2 E U /dl      Bilirubin, UA Negative     Occult Blood, UA Negative     Specific Gravity, UA 1 025    Narrative:      CLINITEK RESULT                 No orders to display         Procedures  Procedures      ED Course  ED Course as of 04/01/23 1739   Fri Mar 31, 2023   1513 Urine Macroscopic, POC                             SBIRT 22yo+    Flowsheet Row Most Recent Value   SBIRT (23 yo +)    In order to provide better care to our patients, we are screening all of our patients for alcohol and drug use  Would it be okay to ask you these screening questions? Unable to answer at this time Filed at: 03/31/2023 1456                Medical Decision Making  29-year-old female, G6, P5, 8 weeks gestation, presenting for evaluation of first trimester vaginal bleeding  On exam the patient is well-appearing, does not appear pale or anemic, abdominal exam is benign  My differential diagnosis includes but is not limited to ectopic pregnancy, miscarriage, subchorionic hemorrhage  Per chart review, patient is O+ and will not require RhoGAM   Will check quantitative beta hCG for trending, perform bedside ultrasound  Patient will need a formal ultrasound if intrauterine pregnancy cannot be confirmed on bedside ultrasound    Will check UA for asymptomatic bacteriuria    Bedside ultrasound performed by ED resident and ED attending, confirmed intrauterine pregnancy with fetal heart rate but showed some fluid between sac and myometrium  Results were discussed in detail with the patient, I advised her that this is a threatened miscarriage, recommended that she follow-up with her OB and gave strict return to ED precautions  All questions were answered at this time  I reviewed all testing with the patient: see above  I gave oral return precautions for what to return for in addition to the written return precautions  The patient (and any family present: n/a) verbalized understanding of the discharge instructions and warnings that would necessitate return to the Emergency Department  I specifically highlighted areas of special concern regarding the written and verbal discharge instructions and return precautions  All questions were answered prior to discharge  Amount and/or Complexity of Data Reviewed  Labs: ordered  Decision-making details documented in ED Course  Risk  OTC drugs  Disposition  Final diagnoses:   Abdominal pain in pregnancy   Vaginal bleeding   Threatened miscarriage     Time reflects when diagnosis was documented in both MDM as applicable and the Disposition within this note     Time User Action Codes Description Comment    3/31/2023  3:20 PM Dick Cover Add [O26 899,  R10 9] Abdominal pain in pregnancy     3/31/2023  3:20 PM Dick Cover Add [N93 9] Vaginal bleeding     3/31/2023  3:28 PM Lemmie Meza Add [Z53 29] Left against medical advice     3/31/2023  3:42 PM Dick Cover Remove [Z53 29] Left against medical advice     3/31/2023  3:42 PM Dick Cover Add [O20 0] Threatened miscarriage       ED Disposition     ED Disposition   Discharge    Condition   Stable    Date/Time   Fri Mar 31, 2023  3:42 PM    Comment   Amisha Franklin discharge to home/self care                 Follow-up Information     Follow up With Specialties Details Why Contact Info Additional [address] Emergency Department Emergency Medicine  If symptoms worsen [address] [address]  [phone] [address] Emergency Department, [address] [place] [address], [address] [facility] for [facility] and Gynecology  For Emergency Department Follow-up [id number] [facility] [address]  [phone] [facility] Caring for [facility] [address], [address] [facility], [facility] Aravind Travis [phone]          There are no discharge medications for this patient  No discharge procedures on file  PDMP Review     None           ED Provider  Attending physically available and evaluated Rhona Otilio TATUM managed the patient along with the ED Attending      Electronically Signed by         Gabe Sood DO  04/01/23 3426

## 2023-04-04 NOTE — PROGRESS NOTES
Pt presents for initial OB appointment  Pt seen in ED 3/31/23 for abdominal pain  Bedside US showed +  bpm, no CRL obtained  Was seen 2 weeks earlier for same complaint and found to have [address]  Pt was noted to have some light bleeding last week, it resolved and she was then seen in the ED after  Pap, PE and cx's to be done at next visit  MRSA:denies   Varicella: had infection  STD history: denies   Drug/alcohol use history: denies   Slip written for initial OB panel plus glucola due to family history   Genetic testing:   US done in office today: TVUS done in office  CRL c/w 10w4d, approx 10 days ahead of dates  Pt unsure of LMP and guesstimated  + FM noted  Parnell IUP  CRL c/w 11w0d       PMHX:beta thal carrier, h/o anxiety and depression    POBHX: SAB x 3, 1 23 week  demise, 1 35 week delivery and 1 36 week delivery, 1  at term    PSURGHX:D&C       All questions answered  Ultrasound Probe Disinfection    A transvaginal ultrasound was performed     Probe identification: probe serial number [employer]: [id number]  Disinfection process: Disinfection was performed with High Level Disinfection Process (Trophon)    Walker Martins PA-C

## 2023-04-05 ENCOUNTER — INITIAL PRENATAL (OUTPATIENT)
Dept: OBGYN CLINIC | Facility: CLINIC | Age: 28
End: 2023-04-05

## 2023-04-05 VITALS
BODY MASS INDEX: 22.5 KG/M2 | HEIGHT: 66 IN | SYSTOLIC BLOOD PRESSURE: 118 MMHG | DIASTOLIC BLOOD PRESSURE: 74 MMHG | WEIGHT: 140 LBS

## 2023-04-05 DIAGNOSIS — Z34.91 FIRST TRIMESTER PREGNANCY: ICD-10-CM

## 2023-04-05 DIAGNOSIS — O20.9 BLEEDING IN EARLY PREGNANCY: Primary | ICD-10-CM

## 2023-04-24 ENCOUNTER — ROUTINE PRENATAL (OUTPATIENT)
Dept: PERINATAL CARE | Facility: OTHER | Age: 28
End: 2023-04-24

## 2023-04-24 VITALS
HEART RATE: 84 BPM | BODY MASS INDEX: 23.08 KG/M2 | SYSTOLIC BLOOD PRESSURE: 132 MMHG | DIASTOLIC BLOOD PRESSURE: 68 MMHG | WEIGHT: 143.6 LBS | HEIGHT: 66 IN

## 2023-04-24 DIAGNOSIS — Z36.82 ENCOUNTER FOR NUCHAL TRANSLUCENCY TESTING: ICD-10-CM

## 2023-04-24 DIAGNOSIS — O09.219 PREVIOUS PRETERM DELIVERY, ANTEPARTUM: Primary | ICD-10-CM

## 2023-04-24 DIAGNOSIS — Z3A.13 13 WEEKS GESTATION OF PREGNANCY: ICD-10-CM

## 2023-04-24 DIAGNOSIS — Z34.91 FIRST TRIMESTER PREGNANCY: ICD-10-CM

## 2023-04-24 NOTE — LETTER
2023     Jasymn Morales PA-C  [address] [facility] [address] [address]    Patient: Miley Guevara   YOB: 1995   Date of Visit: 2023       Dear Dr Sonny Meza: Thank you for referring Dylan Lopez to me for evaluation  Below are my notes for this consultation  If you have questions, please do not hesitate to call me  I look forward to following your patient along with you  Sincerely,        Ian Bragg MD        CC: No Recipients  Ian Bragg MD  2023  4:35 PM  Sign when Signing Visit  Saurav España presents today for genetic screening ultrasound  She is well-known to me from her prior pregnancies  Her first pregnancy in  was a full-term vaginal livery without complications  She had 2 first trimester miscarriages and and spontaneous  birth at 27 weeks and   She had a first trimester miscarriage in 2019 followed by a  birth with  demise  The pregnancy was complicated by a very large subchorionic hematoma which persisted into the second trimester associate with multiple episodes of vaginal bleeding  She underwent cervical length screening with reassuring cervical lengths and intramuscular progesterone   She delivered at 36 weeks and 6 days  She has no other significant medical history  She is known to be a carrier for beta thalassemia  The father the baby is reportedly tested  She has no known drug allergies  She takes prenatal vitamins  Substance use history and family medical history are otherwise unremarkable  A review of systems is otherwise negative  We discussed the options for genetic screening, including but not limited to first trimester screening, second trimester screening, combined first and second trimester screening, noninvasive prenatal testing (NIPT) for patients at high risk and diagnostic screening through the use of CVS and amniocentesis   We discussed the risks and benefits of each "approach including the sensitivities and false positive rates as well as the difference between a screening test and a diagnostic test  At the conclusion of our discussion the patient elected noninvasive prenatal testing  She will contact her insurance company to determine the cost of undergoing this test and return to the  Center for a requisition and possibly to have her blood drawn for this test depending on which lab she chooses  The results should be available approximately 7-10 days after her blood draw  The strongest predictor of  birth (PTB) is a prior spontaneous  birth (sPTB)  Spontaneous  birth (sPTB) recurs in 28 to 50 % of pregnancies, and tends to recur at similar gestational ages  A recent meta analysis, \"Evaluating Progestogens for Preventing  birth International [facility]):  meta-analysis of individual participant data from randomized clinical trials\", was published in the [employer] on 2021  The results showed that compared with those who received no treatment, women with winston pregnancies at high risk for  birth (PTB) due to prior spontaneous  birth who received 17 P or vaginal progesterone were less likely to deliver before 35 weeks of gestation  The study authors concluded that there was a benefit to both 17 P and vaginal progesterone in reducing the risk of  birth, though the certainty regarding the benefit, both maternal and , is greatest for vaginal progesterone  I discussed my recommendation for use of vaginal progesterone beginning at 16 weeks gestation, which was ordered today by me  200mg vaginal suppositories x1 nightly at bedtime  We discussed the options of history indicated cerclage versus cervical length screening  She previously utilized cervical length screening partly because she had some success with intramuscular progesterone    Given that intramuscular progesterone is no longer " available, she decided on a history indicated cerclage in conjunction with vaginal progesterone  I will reach out to my nurses to help    Recommend an anatomy ultrasound be scheduled for approximately 20 weeks gestation  Thank you very much for allowing us to participate in the care of this very nice patient  Should you have any questions, please do not hesitate to contact our office

## 2023-04-24 NOTE — PROGRESS NOTES
Musa Carlson presents today for genetic screening ultrasound  She is well-known to me from her prior pregnancies  Her first pregnancy in  was a full-term vaginal livery without complications  She had 2 first trimester miscarriages and and spontaneous  birth at 27 weeks and 2019  She had a first trimester miscarriage in 2019 followed by a  birth with  demise  The pregnancy was complicated by a very large subchorionic hematoma which persisted into the second trimester associate with multiple episodes of vaginal bleeding  She underwent cervical length screening with reassuring cervical lengths and intramuscular progesterone   She delivered at 36 weeks and 6 days  She has no other significant medical history  She is known to be a carrier for beta thalassemia  The father the baby is reportedly tested  She has no known drug allergies  She takes prenatal vitamins  Substance use history and family medical history are otherwise unremarkable  A review of systems is otherwise negative  We discussed the options for genetic screening, including but not limited to first trimester screening, second trimester screening, combined first and second trimester screening, noninvasive prenatal testing (NIPT) for patients at high risk and diagnostic screening through the use of CVS and amniocentesis  We discussed the risks and benefits of each approach including the sensitivities and false positive rates as well as the difference between a screening test and a diagnostic test  At the conclusion of our discussion the patient elected noninvasive prenatal testing  She will contact her insurance company to determine the cost of undergoing this test and return to the  Center for a requisition and possibly to have her blood drawn for this test depending on which lab she chooses  The results should be available approximately 7-10 days after her blood draw      The strongest predictor of  "birth (PTB) is a prior spontaneous  birth (sPTB)  Spontaneous  birth (sPTB) recurs in 28 to 50 % of pregnancies, and tends to recur at similar gestational ages  A recent meta analysis, \"Evaluating Progestogens for Preventing  birth International [facility]):  meta-analysis of individual participant data from randomized clinical trials\", was published in the [employer] on 2021  The results showed that compared with those who received no treatment, women with winston pregnancies at high risk for  birth (PTB) due to prior spontaneous  birth who received 17 P or vaginal progesterone were less likely to deliver before 35 weeks of gestation  The study authors concluded that there was a benefit to both 17 P and vaginal progesterone in reducing the risk of  birth, though the certainty regarding the benefit, both maternal and , is greatest for vaginal progesterone  I discussed my recommendation for use of vaginal progesterone beginning at 16 weeks gestation, which was ordered today by me  200mg vaginal suppositories x1 nightly at bedtime  We discussed the options of history indicated cerclage versus cervical length screening  She previously utilized cervical length screening partly because she had some success with intramuscular progesterone  Given that intramuscular progesterone is no longer available, she decided on a history indicated cerclage in conjunction with vaginal progesterone  I will reach out to my nurses to help    Recommend an anatomy ultrasound be scheduled for approximately 20 weeks gestation  Thank you very much for allowing us to participate in the care of this very nice patient  Should you have any questions, please do not hesitate to contact our office    "

## 2023-04-25 ENCOUNTER — TELEPHONE (OUTPATIENT)
Dept: PERINATAL CARE | Facility: CLINIC | Age: 28
End: 2023-04-25

## 2023-04-25 NOTE — TELEPHONE ENCOUNTER
Per [facility] message Dr Rendell Cranker- patient requires history indicated cerclage  Phone call to [facility] scheduling, spoke with Don Allen [facility] OR openings - 1pm time only: 4/28/ 5/1 5/5  Phone call to patient, left [facility] requesting she call [facility] for OR scheduling

## 2023-04-27 NOTE — TELEPHONE ENCOUNTER
4/27/23 2:51 pm left Kristine a M requesting she call [facility] to confirm her OR date/ time  Explained [facility] has left [facility] and sent a [employer] message  OR information is available in [facility] and also provided phone Numbers to call back

## 2023-04-28 ENCOUNTER — PRE-BIRTH ASSESSMENT (OUTPATIENT)
Dept: LABOR AND DELIVERY | Facility: HOSPITAL | Age: 28
End: 2023-04-28

## 2023-04-28 ENCOUNTER — TELEPHONE (OUTPATIENT)
Facility: HOSPITAL | Age: 28
End: 2023-04-28

## 2023-04-28 DIAGNOSIS — O09.219 PREVIOUS PRETERM DELIVERY, ANTEPARTUM: Primary | ICD-10-CM

## 2023-04-28 RX ORDER — PROGESTERONE 200 MG/1
200 CAPSULE ORAL
Qty: 30 CAPSULE | Refills: 6 | Status: CANCELLED | OUTPATIENT
Start: 2023-04-28

## 2023-04-28 NOTE — TELEPHONE ENCOUNTER
"Patient called and left a voicemail on teams  Patient is adamant on declining a cerclage  Patient is requesting we cancel her cerclage for May 1, 2023  \"That is too much on my baby girl\"  \"I am not getting it\"  Patient is refusing to go to [facility] for her progesterone  Placed order for progesterone tablets to be inserted vaginally  Advised patient to start taking at 16 weeks (05/13/2023)  Patient verbalized understanding  Labor & Delivery notified  Spoke with Behzad Loja to cancel cerclage 05/01/2023 at 1300 with Dr Timi Glez     "

## 2023-04-28 NOTE — NURSING NOTE
Phone call to patient for pre-operative instructions prior to cerclage  Pt does not have a working phone number on file in her chart  Her emergency contact ( Erskine Duane) has 2 phone numbers on the chart, neither of which are in working order  Will contact provider office

## 2023-05-02 DIAGNOSIS — O09.219 PREVIOUS PRETERM DELIVERY, ANTEPARTUM: Primary | ICD-10-CM

## 2023-05-02 RX ORDER — PROGESTERONE 200 MG/1
200 CAPSULE ORAL
Qty: 30 CAPSULE | Refills: 6 | Status: SHIPPED | OUTPATIENT
Start: 2023-05-02

## 2023-05-03 ENCOUNTER — TELEPHONE (OUTPATIENT)
Dept: OBGYN CLINIC | Facility: CLINIC | Age: 28
End: 2023-05-03

## 2023-05-03 NOTE — TELEPHONE ENCOUNTER
Pt mckayla - has appt 5/5 at 0930 but works until 2pm  Would like an appt after 2pm  Requesting call back # [phone]

## 2023-05-04 NOTE — PROGRESS NOTES
OB/GYN  PN Visit  London Jones  [id number]  2023  9:42 AM  BELL Subramanian    S: 32 y o  [id number] 14w5d here for PN visit  Pregnancy complicated by everyday vaping, anxiety, depression, first trimester bleeding, beta thal carrier, and hx of PTD at 28 and 36 weeks and hx of 23 week  demise after ROM and cervical insufficiency  She reports occasional headaches; relieved with tylenol  Pt presents for prenatal check up  Denies c/o n/v/ no edema, no DV  No vb/lof  No cramping/ctxns or signs of PTL  Established care with MFM  Physical exam and cultures done today  Last pap: 2018  Pap done today per ASCCP guidelines  AFP to be ordered at next visit  Urine: -/-  Vitals:    23 0900   BP: 110/60      1  Needs to complete initial prenatal panel  Encouraged patient to complete  2  Reviewed vaping and encouraged patient to try to limit due to risk it poses in pregnancy, including placental abruption/PTL  3  Reviewed history of PTD and discussed refusal for cerclage  Patient is willing to do vaginal progesterone and serial cervical lengths  Scheduled with MFM on 5/15 at 16 weeks     RTC in 4 weeks

## 2023-05-05 ENCOUNTER — INITIAL PRENATAL (OUTPATIENT)
Dept: OBGYN CLINIC | Facility: CLINIC | Age: 28
End: 2023-05-05

## 2023-05-05 VITALS
HEIGHT: 66 IN | DIASTOLIC BLOOD PRESSURE: 60 MMHG | WEIGHT: 149.6 LBS | SYSTOLIC BLOOD PRESSURE: 110 MMHG | BODY MASS INDEX: 24.04 KG/M2

## 2023-05-05 DIAGNOSIS — D56.3 CARRIER OF BETA THALASSEMIA: Primary | ICD-10-CM

## 2023-05-05 DIAGNOSIS — F41.9 ANXIETY AND DEPRESSION: ICD-10-CM

## 2023-05-05 DIAGNOSIS — F32.A ANXIETY AND DEPRESSION: ICD-10-CM

## 2023-05-05 DIAGNOSIS — O09.219 PREVIOUS PRETERM DELIVERY, ANTEPARTUM: ICD-10-CM

## 2023-05-05 DIAGNOSIS — Z3A.14 14 WEEKS GESTATION OF PREGNANCY: ICD-10-CM

## 2023-05-05 PROCEDURE — 87491 CHLMYD TRACH DNA AMP PROBE: CPT

## 2023-05-05 PROCEDURE — 87661 TRICHOMONAS VAGINALIS AMPLIF: CPT

## 2023-05-05 PROCEDURE — 87591 N.GONORRHOEAE DNA AMP PROB: CPT

## 2023-05-05 PROCEDURE — G0145 SCR C/V CYTO,THINLAYER,RESCR: HCPCS

## 2023-05-09 LAB
C TRACH DNA SPEC QL NAA+PROBE: NEGATIVE
N GONORRHOEA DNA SPEC QL NAA+PROBE: NEGATIVE

## 2023-05-10 LAB
LAB AP GYN PRIMARY INTERPRETATION: NORMAL
LAB AP LMP: NORMAL
Lab: NORMAL
T VAGINALIS DNA SPEC QL NAA+PROBE: NEGATIVE

## 2023-05-15 ENCOUNTER — TELEPHONE (OUTPATIENT)
Dept: OBGYN CLINIC | Facility: CLINIC | Age: 28
End: 2023-05-15

## 2023-05-15 ENCOUNTER — ROUTINE PRENATAL (OUTPATIENT)
Dept: PERINATAL CARE | Facility: CLINIC | Age: 28
End: 2023-05-15

## 2023-05-15 VITALS
HEIGHT: 66 IN | OXYGEN SATURATION: 98 % | WEIGHT: 144.2 LBS | BODY MASS INDEX: 23.18 KG/M2 | DIASTOLIC BLOOD PRESSURE: 70 MMHG | HEART RATE: 84 BPM | SYSTOLIC BLOOD PRESSURE: 110 MMHG

## 2023-05-15 DIAGNOSIS — F41.9 ANXIETY AND DEPRESSION: ICD-10-CM

## 2023-05-15 DIAGNOSIS — Z3A.16 16 WEEKS GESTATION OF PREGNANCY: Primary | ICD-10-CM

## 2023-05-15 DIAGNOSIS — O09.219 PREVIOUS PRETERM DELIVERY, ANTEPARTUM: ICD-10-CM

## 2023-05-15 DIAGNOSIS — F32.A ANXIETY AND DEPRESSION: ICD-10-CM

## 2023-05-15 NOTE — PROGRESS NOTES
"Patient chose to have [facility] Non-invasive Prenatal Screen with fetal sex  Patient given brochure and is aware [facility] will contact their insurance and coordinate coverage  Patient made aware she will need to respond to text message or e-mail from [employer] within 2 business days or testing will be run through insurance  Patient informed text message will come from area code  \"415\"  Provided [employer] # [phone] and web site : [email or web address]  \"[employer] your test online\" card with barcode and test tube ID provided to patient  Reviewed [facility]'s web site states 5-7 business days for results via their portal    [employer] message will be sent to patient when M receives results /provider reviews  2 vials of blood drawn from RIGHT arm by [facility]  Patient tolerated blood draw without difficulty  Specimens labeled with patient identifiers (name, date of birth, specimen collection date), order and specimen were verified with patient, packed and sent via [employer]Menu 122  Copy of lab order scanned to Epic media  Maternal Fetal Medicine will have results in approximately 7-10 business days and will call patient or notify via [address]  Patient aware viewing lab result online will reveal fetal sex if ordered  Patient verbalized understanding of all instructions and no questions at this time    "

## 2023-05-15 NOTE — TELEPHONE ENCOUNTER
Pt lmom, she would like a refill on her prenatal  She would like them sent to [facility] on [address]

## 2023-05-15 NOTE — LETTER
May 16, 2023     Maxim Calvo PA-C  [address] [facility] [address]    Patient: Benedicto Velez   YOB: 1995   Date of Visit: 5/15/2023       Dear Dr Pedro Butts: Thank you for referring Joanne Calderon to me for evaluation  Below are my notes for this consultation  If you have questions, please do not hesitate to call me  I look forward to following your patient along with you  Sincerely,        Sweta Quarles MD        CC: No Recipients  Sweta Quarles MD  5/16/2023  3:57 PM  Sign when Signing Visit  A fetal ultrasound was completed  See Ob procedures in Epic for an interpretation and recommendations  Do not hesitate to contact us in [facility] if you have questions  Michael France MD, [address] [facility]  Maternal Fetal Medicine

## 2023-05-15 NOTE — PROGRESS NOTES
Ultrasound Probe Disinfection    A transvaginal ultrasound was performed  Prior to use, disinfection was performed with High Level Disinfection Process (Trophon)  Probe serial number B1: [id number] was used        Mallika Villalta  05/15/23  1:21 PM

## 2023-05-16 PROBLEM — Z3A.16 16 WEEKS GESTATION OF PREGNANCY: Status: ACTIVE | Noted: 2023-05-16

## 2023-05-16 NOTE — PROGRESS NOTES
A fetal ultrasound was completed  See Ob procedures in Epic for an interpretation and recommendations  Do not hesitate to contact us in [facility] if you have questions  Anya Ferrari MD, [address] [facility]  Maternal Fetal Medicine

## 2023-05-31 NOTE — PATIENT INSTRUCTIONS
Thank you for choosing us for your  care today  If you have any questions about your ultrasound or care, please do not hesitate to contact us or your primary obstetrician  Some general instructions for your pregnancy are:    Exercise: Aim for 22 minutes per day (150 minutes per week) of regular exercise  Walking is great! Nutrition: Choose healthy sources of calcium, iron, and protein  Learn about Preeclampsia: preeclampsia is a common, serious high blood pressure complication in pregnancy  A blood pressure of 238AGKN (systolic or top number) or 04XTWL (diastolic or bottom number) is not normal and needs evaluation by your doctor  Aspirin is sometimes prescribed in early pregnancy to prevent preeclampsia in women with risk factors - ask your obstetrician if you should be on this medication  For more resources, visit:  [employer]  If you smoke, try to reduce how many cigarettes you smoke or try to quit completely  Do not vape  Other warning signs to watch out for in pregnancy or postpartum: chest pain, obstructed breathing or shortness of breath, seizures, thoughts of hurting yourself or your baby, bleeding, a painful or swollen leg, fever, or headache (see [employer]WHONN POST-BIRTH Warning Signs campaign)  If these happen call 911  Itching is also not normal in pregnancy and if you experience this, especially over your hands and feet, potentially worse at night, notify your doctors

## 2023-06-01 ENCOUNTER — ROUTINE PRENATAL (OUTPATIENT)
Dept: PERINATAL CARE | Facility: CLINIC | Age: 28
End: 2023-06-01

## 2023-06-01 ENCOUNTER — TELEPHONE (OUTPATIENT)
Dept: OBGYN CLINIC | Facility: CLINIC | Age: 28
End: 2023-06-01

## 2023-06-01 VITALS
WEIGHT: 150.2 LBS | BODY MASS INDEX: 24.14 KG/M2 | DIASTOLIC BLOOD PRESSURE: 72 MMHG | SYSTOLIC BLOOD PRESSURE: 126 MMHG | HEART RATE: 76 BPM | HEIGHT: 66 IN

## 2023-06-01 DIAGNOSIS — Z3A.16 16 WEEKS GESTATION OF PREGNANCY: Primary | ICD-10-CM

## 2023-06-01 DIAGNOSIS — O09.219 PREVIOUS PRETERM DELIVERY, ANTEPARTUM: Primary | ICD-10-CM

## 2023-06-01 DIAGNOSIS — Z03.75 ENCOUNTER FOR SUSPECTED CERVICAL SHORTENING RULED OUT: ICD-10-CM

## 2023-06-01 NOTE — PROGRESS NOTES
Ultrasound Probe Disinfection    A transvaginal ultrasound was performed  Prior to use, disinfection was performed with High Level Disinfection Process (Trophon)  Probe serial number B1: [id number] was used        Román Lawler

## 2023-06-01 NOTE — PROGRESS NOTES
"[address]: Ms Estephanie Hilliard was seen today for serial cervical length screening ultrasound  See ultrasound report under \"OB Procedures\" tab  The time spent on this established patient on the encounter date included 5 minutes previsit service time reviewing records and precharting, 5 minutes face-to-face service time counseling regarding results and coordinating care, and  4 minutes charting, totalling 14 minutes  Please don't hesitate to contact our office with any concerns or questions    Idalia Mujica MD      "

## 2023-06-01 NOTE — TELEPHONE ENCOUNTER
Tried calling pt regarding her missed appointment today and could not be reached, phone disconnected

## 2023-06-10 DIAGNOSIS — Z34.90 PREGNANCY, UNSPECIFIED GESTATIONAL AGE: Primary | ICD-10-CM

## 2023-06-10 RX ORDER — PNV,CALCIUM 72/IRON/FOLIC ACID 27 MG-1 MG
TABLET ORAL
Qty: 30 TABLET | Refills: 1 | Status: SHIPPED | OUTPATIENT
Start: 2023-06-10 | End: 2023-07-10

## 2023-06-12 ENCOUNTER — ROUTINE PRENATAL (OUTPATIENT)
Dept: PERINATAL CARE | Facility: CLINIC | Age: 28
End: 2023-06-12
Payer: COMMERCIAL

## 2023-06-12 VITALS
WEIGHT: 150.4 LBS | HEART RATE: 97 BPM | DIASTOLIC BLOOD PRESSURE: 62 MMHG | BODY MASS INDEX: 24.17 KG/M2 | SYSTOLIC BLOOD PRESSURE: 110 MMHG | HEIGHT: 66 IN

## 2023-06-12 DIAGNOSIS — Z36.3 ENCOUNTER FOR ANTENATAL SCREENING FOR MALFORMATION USING ULTRASOUND: ICD-10-CM

## 2023-06-12 DIAGNOSIS — O09.219 PREVIOUS PRETERM DELIVERY, ANTEPARTUM: Primary | ICD-10-CM

## 2023-06-12 DIAGNOSIS — Z3A.20 20 WEEKS GESTATION OF PREGNANCY: ICD-10-CM

## 2023-06-12 PROCEDURE — 99213 OFFICE O/P EST LOW 20 MIN: CPT | Performed by: OBSTETRICS & GYNECOLOGY

## 2023-06-12 PROCEDURE — 76805 OB US >/= 14 WKS SNGL FETUS: CPT | Performed by: OBSTETRICS & GYNECOLOGY

## 2023-06-12 PROCEDURE — 76817 TRANSVAGINAL US OBSTETRIC: CPT | Performed by: OBSTETRICS & GYNECOLOGY

## 2023-06-12 NOTE — PROGRESS NOTES
The patient was seen today for an ultrasound  Please see ultrasound report (located under Ob Procedures) for additional details  Thank you very much for allowing us to participate in the care of this very nice patient  Should you have any questions, please do not hesitate to contact me  MD Lisa [place]  Attending Physician, Shreya

## 2023-06-20 ENCOUNTER — TELEPHONE (OUTPATIENT)
Dept: OBGYN CLINIC | Facility: CLINIC | Age: 28
End: 2023-06-20

## 2023-06-20 ENCOUNTER — TELEPHONE (OUTPATIENT)
Facility: HOSPITAL | Age: 28
End: 2023-06-20

## 2023-06-20 NOTE — TELEPHONE ENCOUNTER
"Pt lmom - had a \"belly lift\" from prior pregnancy and has been helping with a hernia she currently has, not having anything done to hernia during pregnancy, lost belly lift and wondering if any recommendations or pain medications she can have for hernia  "

## 2023-06-20 NOTE — TELEPHONE ENCOUNTER
Patient aware to try tylenol  Can do 1000 mg every 8 hours and to not exceed 3000 mg in a 24 hour period  Patient aware can purchase another belly band to see if that helps  If no improvement to give us a call for further recommendations

## 2023-06-20 NOTE — TELEPHONE ENCOUNTER
Laz Santillan called the nurse line stating she is having some abdominal discomfort from her umbilical hernia  She also stated she called her OB and left them a message  Patient states the pain gets worse when she is working and wants to know what to do? Advised her to go to ER or LD triage for evaluation due to her umbilical hernia  Patient's states she will wait for her OB  to call her back  Patient verbalized understanding and declines further assistance

## 2023-06-27 ENCOUNTER — TELEPHONE (OUTPATIENT)
Dept: OBGYN CLINIC | Facility: CLINIC | Age: 28
End: 2023-06-27

## 2023-06-27 NOTE — TELEPHONE ENCOUNTER
Pt called to cancel scheduled appt in office today d/t not feeling well  Pt reports HA with onset this morning, took 2 extra strength tylenol and did notice improvement but pt states she does not feel comfortable driving as still having HA  Pt denies any vision changes, edema, or any other concerns at this time  Pt aware tylenol 1000mg every 8 hours as needed, compress to forehead and avoid bright lightening, pt states she has not been hydrating as best as she can and notices HA after long days at work, encouraged hydration to help prevent  Pt verbalizes understanding if no improvement or persistent HA or any other symptoms to call back  Offered appt tomorrow but pt states she has MFM appt, additional appt offered and scheduled  Pt aware if any questions or concerns prior to appt to call

## 2023-07-06 ENCOUNTER — ULTRASOUND (OUTPATIENT)
Dept: PERINATAL CARE | Facility: CLINIC | Age: 28
End: 2023-07-06
Payer: COMMERCIAL

## 2023-07-06 VITALS
DIASTOLIC BLOOD PRESSURE: 60 MMHG | HEART RATE: 80 BPM | WEIGHT: 148.4 LBS | SYSTOLIC BLOOD PRESSURE: 104 MMHG | HEIGHT: 66 IN | BODY MASS INDEX: 23.85 KG/M2

## 2023-07-06 DIAGNOSIS — Z3A.23 23 WEEKS GESTATION OF PREGNANCY: ICD-10-CM

## 2023-07-06 DIAGNOSIS — O09.219 PREVIOUS PRETERM DELIVERY, ANTEPARTUM: Primary | ICD-10-CM

## 2023-07-06 DIAGNOSIS — D56.3 CARRIER OF BETA THALASSEMIA: ICD-10-CM

## 2023-07-06 DIAGNOSIS — Z36.2 ENCOUNTER FOR OTHER ANTENATAL SCREENING FOLLOW-UP: ICD-10-CM

## 2023-07-06 PROCEDURE — 99213 OFFICE O/P EST LOW 20 MIN: CPT | Performed by: OBSTETRICS & GYNECOLOGY

## 2023-07-06 PROCEDURE — 76817 TRANSVAGINAL US OBSTETRIC: CPT | Performed by: OBSTETRICS & GYNECOLOGY

## 2023-07-06 PROCEDURE — 76816 OB US FOLLOW-UP PER FETUS: CPT | Performed by: OBSTETRICS & GYNECOLOGY

## 2023-07-06 RX ORDER — FERROUS SULFATE TAB EC 324 MG (65 MG FE EQUIVALENT) 324 (65 FE) MG
324 TABLET DELAYED RESPONSE ORAL
Qty: 60 TABLET | Refills: 3 | Status: SHIPPED | OUTPATIENT
Start: 2023-07-06

## 2023-07-06 NOTE — PROGRESS NOTES
The patient was seen today for an ultrasound. Please see ultrasound report (located under Ob Procedures) for additional details. Thank you very much for allowing us to participate in the care of this very nice patient. Should you have any questions, please do not hesitate to contact me[place] [address] [facility]  Attending Physician, [address] [place] [address]

## 2023-07-13 ENCOUNTER — ROUTINE PRENATAL (OUTPATIENT)
Dept: OBGYN CLINIC | Facility: CLINIC | Age: 28
End: 2023-07-13
Payer: COMMERCIAL

## 2023-07-13 VITALS
BODY MASS INDEX: 25.39 KG/M2 | WEIGHT: 158 LBS | DIASTOLIC BLOOD PRESSURE: 72 MMHG | HEIGHT: 66 IN | SYSTOLIC BLOOD PRESSURE: 124 MMHG

## 2023-07-13 DIAGNOSIS — Z3A.23 23 WEEKS GESTATION OF PREGNANCY: Primary | ICD-10-CM

## 2023-07-13 DIAGNOSIS — Z34.92 PRENATAL CARE IN SECOND TRIMESTER: ICD-10-CM

## 2023-07-13 PROCEDURE — 99213 OFFICE O/P EST LOW 20 MIN: CPT | Performed by: OBSTETRICS & GYNECOLOGY

## 2023-07-13 RX ORDER — PNV,CALCIUM 72/IRON/FOLIC ACID 27 MG-1 MG
TABLET ORAL
COMMUNITY
Start: 2023-07-10 | End: 2023-07-14 | Stop reason: ALTCHOICE

## 2023-07-13 NOTE — PROGRESS NOTES
Patient reports very tired and waited too long, was going to leave but stayed for baby check. Patient reports cramping a few times a day whole abdomen gets tight. Reviewed that when she is having more cramping we would want her to hydrate, empty her bladder and try to rest if cramping continues especially if more than 5 to 6/h she should call and we may need to be evaluated on labor and delivery discussed her recent ultrasound  center was encouraging and reassuring discussed that she should let us know what notes need to be written for work so that she can empty her bladder as needed and rest when needed especially due to her history of prior early delivery. Patient given slip for CBC, CMP, Glucola, urine protein creatinine encourage patient to get early labs and this blood work together when she is able and let us know what we can do to support her so that work is allowing her what she needs to stay safe.   Return in 4 weeks or sooner as needed

## 2023-07-14 DIAGNOSIS — O09.219 PREVIOUS PRETERM DELIVERY, ANTEPARTUM: ICD-10-CM

## 2023-07-14 DIAGNOSIS — Z3A.16 16 WEEKS GESTATION OF PREGNANCY: ICD-10-CM

## 2023-07-14 RX ORDER — PROGESTERONE 200 MG/1
200 CAPSULE ORAL
Qty: 30 CAPSULE | Refills: 0 | Status: SHIPPED | OUTPATIENT
Start: 2023-07-14

## 2023-07-25 ENCOUNTER — TELEPHONE (OUTPATIENT)
Dept: OBGYN CLINIC | Facility: CLINIC | Age: 28
End: 2023-07-25

## 2023-07-25 NOTE — PROGRESS NOTES
OB/GYN  PN Visit  Skip Mckinnon  [id number]  2023  10:11 AM  dangelo GroverBELL    S: 32 y.o. [id number] 27w2d here for PN visit. Pregnancy complicated by everyday vaping, anxiety, depression, first trimester bleeding, beta thal carrier, and hx of PTD at 28 and 36 weeks and hx of 23 week  demise after ROM and cervical insufficiency. She declined cerclage with this pregnancy. OB complaints:  Denies c/o v, no edema, no smoking, no DV. No vb/lof  No cramping/ctxns or signs of PTL. She reports nausea and headaches; both are tolerable and relived with hydration. She reports that her  states the baby is 'low' with progesterone insertion last night. She reports that she had a small amount of vaginal discharge; clear fluid, no odor/color. O:    Pre- Vitals    Flowsheet Row Most Recent Value   Prenatal Assessment    Fetal Heart Rate 145   Fundal Height (cm) 27 cm   Movement Present   Prenatal Vitals    Blood Pressure 118/72   Weight - Scale 69.1 kg (152 lb 6.4 oz)   Urine Albumin/Glucose    Dilation/Effacement/Station    Cervical Dilation . 5   Vaginal Drainage    Draining Fluid No   Edema    LLE Edema None   RLE Edema None            Gen: no acute distress, nonlabored breathing. OB exam completed: fundal height, +FHT. Urine: -/-    A/P:  1. 27w2d GESTATION  2. Labor precautions reviewed  3. Fetal kick counts reviewed  4. Advised patient to complete blood work. She has not completed any prenatal blood work and reviewed that further testing would be needed for her and baby at time of delivery. 5. ROM workup: negative. (-) pooling, (-)nitrazine. Unable to use microscope at office today. Active discharge is white and thick in the posterior vaginal vault. Genital culture collected to rule out vaginal infection. SVE: 0.5/th/high. No gel was utilized for exam.   6. Rupture precautions were reviewed and advised when to call office or seek immediate attention.         RTC in 4 weeks        Isaac Carpenter BELL León  7/31/2023  10:11 AM

## 2023-07-25 NOTE — TELEPHONE ENCOUNTER
Patient called, 26.3 weeks, she is concerned as she is high risk with early delivery with last pregnancy. Patient is c/o Alexis zazueta contractions started last month. Currently they 10 minutes apart, lasting just a few seconds. Breathing exercises. Patient is not hydrating as frequently as she should. Able to keep food down. Massaging stomach. No pressure, pain. Feels abimbola she is leaking fluids. No gush of fluid. No bleeding or spotting. No decrease in fetal movement. Patient is scheduled today at 1300 with Dr. Tanya Rob. Advised patient to monitor contractions, if they are 5 minutes apart and lasting 1 minute or more to call our office, bleeding, abdominal. Patient then needed to r/s her appointment today due to work - r/s for Monday 7/31 with Sukhi Choi.

## 2023-07-31 ENCOUNTER — ROUTINE PRENATAL (OUTPATIENT)
Dept: OBGYN CLINIC | Facility: CLINIC | Age: 28
End: 2023-07-31
Payer: COMMERCIAL

## 2023-07-31 VITALS
DIASTOLIC BLOOD PRESSURE: 72 MMHG | HEIGHT: 66 IN | SYSTOLIC BLOOD PRESSURE: 118 MMHG | BODY MASS INDEX: 24.49 KG/M2 | WEIGHT: 152.4 LBS

## 2023-07-31 DIAGNOSIS — O09.219 PREVIOUS PRETERM DELIVERY, ANTEPARTUM: ICD-10-CM

## 2023-07-31 DIAGNOSIS — N89.8 VAGINAL DISCHARGE: ICD-10-CM

## 2023-07-31 DIAGNOSIS — D56.3 CARRIER OF BETA THALASSEMIA: ICD-10-CM

## 2023-07-31 DIAGNOSIS — Z3A.27 27 WEEKS GESTATION OF PREGNANCY: Primary | ICD-10-CM

## 2023-07-31 PROCEDURE — 99213 OFFICE O/P EST LOW 20 MIN: CPT

## 2023-07-31 PROCEDURE — 87070 CULTURE OTHR SPECIMN AEROBIC: CPT

## 2023-08-04 LAB — BACTERIA GENITAL AEROBE CULT: NORMAL

## 2023-08-09 PROBLEM — Z3A.28 28 WEEKS GESTATION OF PREGNANCY: Status: ACTIVE | Noted: 2023-05-16

## 2023-08-10 ENCOUNTER — ULTRASOUND (OUTPATIENT)
Dept: PERINATAL CARE | Facility: CLINIC | Age: 28
End: 2023-08-10
Payer: COMMERCIAL

## 2023-08-10 VITALS
HEART RATE: 96 BPM | DIASTOLIC BLOOD PRESSURE: 70 MMHG | SYSTOLIC BLOOD PRESSURE: 118 MMHG | WEIGHT: 149.2 LBS | BODY MASS INDEX: 23.98 KG/M2 | HEIGHT: 66 IN

## 2023-08-10 DIAGNOSIS — Z3A.28 28 WEEKS GESTATION OF PREGNANCY: ICD-10-CM

## 2023-08-10 DIAGNOSIS — O36.5930 POOR FETAL GROWTH AFFECTING MANAGEMENT OF MOTHER IN THIRD TRIMESTER, SINGLE OR UNSPECIFIED FETUS: Primary | ICD-10-CM

## 2023-08-10 PROCEDURE — 76816 OB US FOLLOW-UP PER FETUS: CPT | Performed by: OBSTETRICS & GYNECOLOGY

## 2023-08-10 PROCEDURE — 76819 FETAL BIOPHYS PROFIL W/O NST: CPT | Performed by: OBSTETRICS & GYNECOLOGY

## 2023-08-10 PROCEDURE — 76820 UMBILICAL ARTERY ECHO: CPT | Performed by: OBSTETRICS & GYNECOLOGY

## 2023-08-10 NOTE — PROGRESS NOTES
The patient was seen today for an ultrasound. Please see ultrasound report (located under Ob Procedures) for additional details. Thank you very much for allowing us to participate in the care of this very nice patient. Should you have any questions, please do not hesitate to contact me. [place]ir[place] [id number] [facility]  Attending Physician, [address] [place] [address]

## 2023-08-11 DIAGNOSIS — D56.3 CARRIER OF BETA THALASSEMIA: ICD-10-CM

## 2023-08-11 DIAGNOSIS — Z3A.16 16 WEEKS GESTATION OF PREGNANCY: ICD-10-CM

## 2023-08-11 DIAGNOSIS — O09.219 PREVIOUS PRETERM DELIVERY, ANTEPARTUM: ICD-10-CM

## 2023-08-11 RX ORDER — FERROUS SULFATE TAB EC 324 MG (65 MG FE EQUIVALENT) 324 (65 FE) MG
324 TABLET DELAYED RESPONSE ORAL
Qty: 60 TABLET | Refills: 0 | Status: SHIPPED | OUTPATIENT
Start: 2023-08-11

## 2023-08-11 RX ORDER — PROGESTERONE 200 MG/1
200 CAPSULE ORAL
Qty: 30 CAPSULE | Refills: 0 | Status: SHIPPED | OUTPATIENT
Start: 2023-08-11

## 2023-08-15 ENCOUNTER — TELEPHONE (OUTPATIENT)
Facility: HOSPITAL | Age: 28
End: 2023-08-15

## 2023-08-15 NOTE — TELEPHONE ENCOUNTER
PT called to cancel her NST/YOVANNY for today. Offered to r/s tmrw or Thursday in [place], [place] declined and confirmed her next appt for 8/24.

## 2023-08-16 ENCOUNTER — TELEPHONE (OUTPATIENT)
Dept: OBGYN CLINIC | Facility: CLINIC | Age: 28
End: 2023-08-16

## 2023-08-16 NOTE — TELEPHONE ENCOUNTER
I called patient because she cancelled her appointment for 8/17/23. Said she did not have transportation. We currently do not have a appointment and she is scheduled 8/24/23. I asked her how she felt said she isn't really having any jesús zazueta and she is taking her progesterone at night and her prenatal and iron in the morning. Said she had a migraine yesterday and the day before which responded well to tylenol. Said she is feeling fine now.  I informed her about our Lyft program and she wants us to schedule her for a Lyft ride for her 8/24/23 appointment at 9:30 am.

## 2023-08-21 NOTE — PROGRESS NOTES
OB/GYN  PN Visit  [facility]  [id number]  2023  8:51 AM  Jeanne Bence, CRNP    S: 32 y.o. [id number] 30w5d here for PN visit. Pregnancy complicated by everyday vaping, anxiety, depression, first trimester bleeding, beta thal carrier, and hx of PTD at 28 and 36 weeks and hx of 23 week  demise after ROM and cervical insufficiency. She declined cerclage with this pregnancy. OB complaints:  Denies edema,  no DV. No vb/lof  No cramping/ctxns or signs of PTL. She reports nausea and vomiting; tolerable. She is doing Ensure for weight gain. +vaping. +headaches with excessive activity; no pre-e sxs. O:    Pre- Vitals    Flowsheet Row Most Recent Value   Prenatal Assessment    Prenatal Vitals    Blood Pressure 122/76   Weight - Scale 67.7 kg (149 lb 3.2 oz)   Urine Albumin/Glucose    Dilation/Effacement/Station    Vaginal Drainage    Edema             Gen: no acute distress, nonlabored breathing. OB exam completed: fundal height, +FHT. Urine: -Nancy Rader protein    A/P:  #1. 30w5d GESTATION  Labor precautions reviewed  Fetal kick counts reviewed  Tdap: given today. Advised patient to complete prenatal blood work, including pre-e labs. Yellow packet given and consents signed   Breast pump: order placed today. Pediatrician: +, probably going to same pediatrician as other child. Contraception: Nexplanon in postpartum prior to dc'd.        RTC in 2 weeks      Jeanne Bence, CRNP  2023  8:51 AM

## 2023-08-23 ENCOUNTER — TELEPHONE (OUTPATIENT)
Dept: OBGYN CLINIC | Facility: CLINIC | Age: 28
End: 2023-08-23

## 2023-08-23 ENCOUNTER — PATIENT MESSAGE (OUTPATIENT)
Dept: OBGYN CLINIC | Facility: CLINIC | Age: 28
End: 2023-08-23

## 2023-08-23 NOTE — PROGRESS NOTES
Please refer to the [facility] ultrasound report in Ob Procedures for additional information regarding today's visit

## 2023-08-24 ENCOUNTER — ROUTINE PRENATAL (OUTPATIENT)
Dept: PERINATAL CARE | Facility: CLINIC | Age: 28
End: 2023-08-24
Payer: COMMERCIAL

## 2023-08-24 ENCOUNTER — ROUTINE PRENATAL (OUTPATIENT)
Dept: OBGYN CLINIC | Facility: CLINIC | Age: 28
End: 2023-08-24

## 2023-08-24 ENCOUNTER — APPOINTMENT (OUTPATIENT)
Facility: HOSPITAL | Age: 28
End: 2023-08-24
Payer: COMMERCIAL

## 2023-08-24 VITALS
BODY MASS INDEX: 24.01 KG/M2 | HEART RATE: 102 BPM | HEIGHT: 66 IN | WEIGHT: 149.4 LBS | SYSTOLIC BLOOD PRESSURE: 122 MMHG | DIASTOLIC BLOOD PRESSURE: 70 MMHG

## 2023-08-24 VITALS
DIASTOLIC BLOOD PRESSURE: 76 MMHG | BODY MASS INDEX: 23.98 KG/M2 | WEIGHT: 149.2 LBS | HEIGHT: 66 IN | SYSTOLIC BLOOD PRESSURE: 122 MMHG

## 2023-08-24 DIAGNOSIS — O36.5930 POOR FETAL GROWTH AFFECTING MANAGEMENT OF MOTHER IN THIRD TRIMESTER, SINGLE OR UNSPECIFIED FETUS: ICD-10-CM

## 2023-08-24 DIAGNOSIS — O09.219 PREVIOUS PRETERM DELIVERY, ANTEPARTUM: ICD-10-CM

## 2023-08-24 DIAGNOSIS — Z3A.30 30 WEEKS GESTATION OF PREGNANCY: Primary | ICD-10-CM

## 2023-08-24 DIAGNOSIS — D56.3 CARRIER OF BETA THALASSEMIA: ICD-10-CM

## 2023-08-24 DIAGNOSIS — O09.893 HISTORY OF PRETERM DELIVERY, CURRENTLY PREGNANT, THIRD TRIMESTER: ICD-10-CM

## 2023-08-24 LAB
DME PARACHUTE DELIVERY DATE REQUESTED: NORMAL
DME PARACHUTE ITEM DESCRIPTION: NORMAL
DME PARACHUTE ORDER STATUS: NORMAL
DME PARACHUTE SUPPLIER NAME: NORMAL
DME PARACHUTE SUPPLIER PHONE: NORMAL

## 2023-08-24 PROCEDURE — 59025 FETAL NON-STRESS TEST: CPT | Performed by: STUDENT IN AN ORGANIZED HEALTH CARE EDUCATION/TRAINING PROGRAM

## 2023-08-24 PROCEDURE — 76815 OB US LIMITED FETUS(S): CPT | Performed by: STUDENT IN AN ORGANIZED HEALTH CARE EDUCATION/TRAINING PROGRAM

## 2023-08-24 PROCEDURE — NC001 PR NO CHARGE: Performed by: STUDENT IN AN ORGANIZED HEALTH CARE EDUCATION/TRAINING PROGRAM

## 2023-08-24 PROCEDURE — 76820 UMBILICAL ARTERY ECHO: CPT | Performed by: STUDENT IN AN ORGANIZED HEALTH CARE EDUCATION/TRAINING PROGRAM

## 2023-08-24 NOTE — PATIENT INSTRUCTIONS
Kick Counts in Pregnancy   WHAT YOU NEED TO KNOW:   What do I need to know about kick counts? Kick counts measure how much your baby is moving in your womb. A kick from your baby can be felt as a twist, turn, swish, roll, or jab. It is common to feel your baby kicking at 26 to 28 weeks of pregnancy. You may feel your baby kick as early as 20 weeks of pregnancy. You may want to start counting at 28 weeks. Why should I measure kick counts? Your baby's movement may provide information about your baby's health. He or she may move less, or not at all, if there are problems. Your baby may move less if he or she is not getting enough oxygen or nutrition from the placenta. Do not smoke while you are pregnant. Smoking decreases the amount of oxygen that gets to your baby. Talk to your healthcare provider if you need help to quit smoking. Tell your healthcare provider as soon as you feel a change in your baby's movements. When do I measure kick counts? Measure kick counts at the same time every day. Measure kick counts when your baby is awake and most active. Your baby may be most active in the evening. How do I measure kick counts? Check that your baby is awake before you measure kick counts. You can wake up your baby by lightly pushing on your belly, walking, or drinking something cold. Your healthcare provider may tell you different ways to measure kick counts. You may be told to do the following:  Use a chart or clock to keep track of the time you start and finish counting. Sit in a chair or lie on your left side. Place your hands on the largest part of your belly. Count until you reach 10 kicks. Write down how much time it takes to count 10 kicks. It may take 30 minutes to 2 hours to count 10 kicks. It should not take more than 2 hours to count 10 kicks. When should I contact my doctor? You feel a change in the number of kicks or movements of your baby.      You feel fewer than 10 kicks within 2 hours. You have questions or concerns about your baby's movements. CARE AGREEMENT:   You have the right to help plan your care. Learn about your health condition and how it may be treated. Discuss treatment options with your healthcare providers to decide what care you want to receive. You always have the right to refuse treatment. The above information is an  only. It is not intended as medical advice for individual conditions or treatments. Talk to your doctor, nurse or pharmacist before following any medical regimen to see if it is safe and effective for you. © Copyright Miguel Angel Gomez 2022 Information is for End User's use only and may not be sold, redistributed or otherwise used for commercial purposes.

## 2023-08-24 NOTE — PROGRESS NOTES
TDAP given in right deltoid per pt request. Pt stated she has never had any reactions to immunizations. VIS given at time of administration. Pt tolerated well.    Sari Goodrich MA

## 2023-08-24 NOTE — PROGRESS NOTES
[id number]  [facility]: Ms. Jarrett Bragg was seen today for umbilical artery Doppler study, YOVANNY, and NST. See ultrasound report under "OB Procedures" tab. Please don't hesitate to contact our office with any concerns or questions.   -Tony Chatman MD

## 2023-08-31 ENCOUNTER — ULTRASOUND (OUTPATIENT)
Dept: PERINATAL CARE | Facility: CLINIC | Age: 28
End: 2023-08-31
Payer: COMMERCIAL

## 2023-08-31 VITALS
SYSTOLIC BLOOD PRESSURE: 120 MMHG | HEART RATE: 96 BPM | WEIGHT: 150.2 LBS | HEIGHT: 66 IN | BODY MASS INDEX: 24.14 KG/M2 | DIASTOLIC BLOOD PRESSURE: 60 MMHG

## 2023-08-31 DIAGNOSIS — O36.5930 POOR FETAL GROWTH AFFECTING MANAGEMENT OF MOTHER IN THIRD TRIMESTER, SINGLE OR UNSPECIFIED FETUS: ICD-10-CM

## 2023-08-31 DIAGNOSIS — Z3A.31 31 WEEKS GESTATION OF PREGNANCY: Primary | ICD-10-CM

## 2023-08-31 PROCEDURE — 59025 FETAL NON-STRESS TEST: CPT | Performed by: OBSTETRICS & GYNECOLOGY

## 2023-08-31 PROCEDURE — 76815 OB US LIMITED FETUS(S): CPT | Performed by: OBSTETRICS & GYNECOLOGY

## 2023-08-31 PROCEDURE — 76820 UMBILICAL ARTERY ECHO: CPT | Performed by: OBSTETRICS & GYNECOLOGY

## 2023-08-31 NOTE — PROGRESS NOTES
Repeat Non-Stress Testing:    Patient verbalizes +FM. Pt denies ALL:               Leaking of fluid   Contractions   Vaginal bleeding   Decreased fetal movement    Patient is performing daily kick counts. Patient has no questions or concerns. NST strip reviewed by .

## 2023-09-07 ENCOUNTER — ROUTINE PRENATAL (OUTPATIENT)
Facility: HOSPITAL | Age: 28
End: 2023-09-07
Payer: COMMERCIAL

## 2023-09-07 ENCOUNTER — ULTRASOUND (OUTPATIENT)
Facility: HOSPITAL | Age: 28
End: 2023-09-07
Payer: COMMERCIAL

## 2023-09-07 ENCOUNTER — ROUTINE PRENATAL (OUTPATIENT)
Dept: OBGYN CLINIC | Facility: CLINIC | Age: 28
End: 2023-09-07
Payer: COMMERCIAL

## 2023-09-07 VITALS — BODY MASS INDEX: 24.37 KG/M2 | DIASTOLIC BLOOD PRESSURE: 68 MMHG | SYSTOLIC BLOOD PRESSURE: 112 MMHG | WEIGHT: 151 LBS

## 2023-09-07 VITALS
WEIGHT: 151.24 LBS | SYSTOLIC BLOOD PRESSURE: 128 MMHG | HEIGHT: 66 IN | BODY MASS INDEX: 24.31 KG/M2 | DIASTOLIC BLOOD PRESSURE: 68 MMHG | HEART RATE: 105 BPM

## 2023-09-07 DIAGNOSIS — Z3A.28 28 WEEKS GESTATION OF PREGNANCY: Primary | ICD-10-CM

## 2023-09-07 DIAGNOSIS — O36.5930 POOR FETAL GROWTH AFFECTING MANAGEMENT OF MOTHER IN THIRD TRIMESTER, SINGLE OR UNSPECIFIED FETUS: ICD-10-CM

## 2023-09-07 DIAGNOSIS — Z3A.32 32 WEEKS GESTATION OF PREGNANCY: ICD-10-CM

## 2023-09-07 DIAGNOSIS — O09.219 PREVIOUS PRETERM DELIVERY, ANTEPARTUM: ICD-10-CM

## 2023-09-07 DIAGNOSIS — O36.5930 POOR FETAL GROWTH AFFECTING MANAGEMENT OF MOTHER IN THIRD TRIMESTER, SINGLE OR UNSPECIFIED FETUS: Primary | ICD-10-CM

## 2023-09-07 PROCEDURE — 76820 UMBILICAL ARTERY ECHO: CPT | Performed by: OBSTETRICS & GYNECOLOGY

## 2023-09-07 PROCEDURE — 76818 FETAL BIOPHYS PROFILE W/NST: CPT | Performed by: OBSTETRICS & GYNECOLOGY

## 2023-09-07 PROCEDURE — NC001 PR NO CHARGE: Performed by: OBSTETRICS & GYNECOLOGY

## 2023-09-07 PROCEDURE — 76816 OB US FOLLOW-UP PER FETUS: CPT | Performed by: OBSTETRICS & GYNECOLOGY

## 2023-09-07 PROCEDURE — 99213 OFFICE O/P EST LOW 20 MIN: CPT | Performed by: OBSTETRICS & GYNECOLOGY

## 2023-09-07 RX ORDER — PNV,CALCIUM 72/IRON/FOLIC ACID 27 MG-1 MG
1 TABLET ORAL DAILY
COMMUNITY
Start: 2023-08-08

## 2023-09-07 NOTE — PROGRESS NOTES
Repeat Non-Stress Testing:    Patient verbalizes +FM. Pt denies ALL:               Leaking of fluid   Contractions   Vaginal bleeding   Decreased fetal movement    Patient is performing daily kick counts. Patient has no questions or concerns. NST strip reviewed by Dr. Randolph He.

## 2023-09-07 NOTE — ASSESSMENT & PLAN NOTE
- Continue PNV  - Labor precautions reviewed  - Fetal kick counts reviewed  - Labs: Encouraged to complete labs ASAP  - Genetics: NIPT neg; MSAFP not completed3  - Ultrasounds: Most growth scan showed FGR w/ EEW 11% and AC 9% & known placental cyst  - Tdap: Administered 23  - Flu Shot: Will offer in season  - COVID: Unknown  - Delivery:  with epidural  - Contraception: Nexplanon  - Breastfeeding: Yes  - RTO in 2 week

## 2023-09-07 NOTE — PROGRESS NOTES
OB/GYN  PN Visit  Jinny Key  [id number]  2023  3:39 PM  Dr. Nicholas Dubin, MD    S: 32 y.o. [id number] 32w5d here for PN visit. She denies contractions. She reports possible leakage of fluid yesterday but she is unsure. She denies vaginal bleeding. She reports good fetal movement. She denies nausea, vomiting, headache, cramping, edema, domestic violence, and smoking. She is very worried about the growth of her baby - she is worried that she caused the recent diagnosis of FGR as she reports that her nutritional status at the beginning of her pregnancy was poor. She is now drinking Ensures and is no longer working at [employer] and is no longer vaping. Her pregnancy is complicated by history of  demise after PPROM and delivery at 23w3d (2020), Hx PTD (x3), Hx PPD, anxiety, beta thalassemia carrier. O:  Pre-Rosanne Vitals    Flowsheet Row Most Recent Value   Prenatal Assessment    Fetal Heart Rate 158   Fundal Height (cm) 30 cm   Movement Present   Prenatal Vitals    Blood Pressure 112/68   Weight - Scale 68.5 kg (151 lb)   Urine Albumin/Glucose    Dilation/Effacement/Station    Vaginal Drainage    Draining Fluid Yes   Fluid Color Clear   Fluid Odor Odorless   Fluid Amount Minimal   # of Pads Previous Hr 0   Edema    LLE Edema None   RLE Edema None        Physical Exam  Vitals reviewed. Constitutional:       General: She is not in acute distress. Appearance: Normal appearance. She is well-developed. She is not ill-appearing, toxic-appearing or diaphoretic. Cardiovascular:      Rate and Rhythm: Normal rate. Pulmonary:      Effort: Pulmonary effort is normal. No respiratory distress. Abdominal:      General: There is no distension. Palpations: Abdomen is soft. There is no mass. Tenderness: There is no abdominal tenderness. There is no guarding or rebound. Genitourinary:     General: Normal vulva. Exam position: Lithotomy position.       Labia:         Right: No rash, tenderness, lesion or injury. Left: No rash, tenderness, lesion or injury. Vagina: No signs of injury and foreign body. Vaginal discharge (physiologic - no pooling) present. No erythema, tenderness, bleeding, lesions or prolapsed vaginal walls. Cervix: No cervical motion tenderness, discharge, friability, lesion, erythema, cervical bleeding or eversion. Comments: Gravid, nontender  Nitrazine negative  Skin:     General: Skin is warm and dry. Neurological:      Mental Status: She is alert and oriented to person, place, and time. Psychiatric:         Mood and Affect: Mood normal.         Behavior: Behavior normal.         A/P:    Problem List        Unprioritized    Previous  delivery, antepartum    Overview     Hx PTD x3  2019 - 35w0d  2020- 23w3d  2021: 36w6d  Declined cerclage    Hx  demise secondary to PPROM w/ delivery at 23w3d         Anxiety and depression    Carrier of beta thalassemia    Overview     Silent carrier  I asked her to have FOB get tested asap with his PCP with hemoglobin electrophoresis, ferritin, and cbc and she agreed to have him get the blood drawn.          Postpartum depression    32 weeks gestation of pregnancy    Current Assessment & Plan     - Continue PNV  - Labor precautions reviewed  - Fetal kick counts reviewed  - Labs: Encouraged to complete labs ASAP  - Genetics: NIPT neg; MSAFP not completed3  - Ultrasounds: Most growth scan showed FGR w/ EEW 11% and AC 9% & known placental cyst  - Tdap: Administered 23  - Flu Shot: Will offer in season  - COVID: Unknown  - Delivery:  with epidural  - Contraception: Nexplanon  - Breastfeeding: Yes  - RTO in 2 week         Poor fetal growth affecting management of mother in third trimester    Overview     Most growth scan showed FGR w/ EFW 11% and AC 9% & known placental cyst  Continue weekly NST/ YOVANNY              Future Appointments   Date Time Provider [address]   9/15/2023 11:00 AM  [facility]   2023  1:00 PM BELL Hoover [facility]   2023 11:00 AM  [facility]   2023  3:15 PM  [facility]   10/5/2023  3:15 PM  NURSE  [facility] [facility]   10/6/2023  1:30 PM Stephen Burgos MD [facility]   10/12/2023  1:15 PM Janell Lujan MD [facility]   10/13/2023 11:00 AM  [facility]   10/19/2023  1:15 PM Janell Lujan MD [facility]   10/25/2023  1:15 PM Lyndsey Robles, [address], MD  2023  3:39 PM

## 2023-09-09 DIAGNOSIS — Z3A.16 16 WEEKS GESTATION OF PREGNANCY: ICD-10-CM

## 2023-09-09 DIAGNOSIS — D56.3 CARRIER OF BETA THALASSEMIA: ICD-10-CM

## 2023-09-09 DIAGNOSIS — O09.219 PREVIOUS PRETERM DELIVERY, ANTEPARTUM: ICD-10-CM

## 2023-09-11 RX ORDER — PROGESTERONE 200 MG/1
200 CAPSULE ORAL
Qty: 30 CAPSULE | Refills: 0 | Status: SHIPPED | OUTPATIENT
Start: 2023-09-11

## 2023-09-11 RX ORDER — FERROUS SULFATE TAB EC 324 MG (65 MG FE EQUIVALENT) 324 (65 FE) MG
324 TABLET DELAYED RESPONSE ORAL
Qty: 60 TABLET | Refills: 0 | Status: SHIPPED | OUTPATIENT
Start: 2023-09-11

## 2023-09-15 ENCOUNTER — ULTRASOUND (OUTPATIENT)
Facility: HOSPITAL | Age: 28
End: 2023-09-15
Payer: COMMERCIAL

## 2023-09-15 VITALS
SYSTOLIC BLOOD PRESSURE: 118 MMHG | HEIGHT: 66 IN | DIASTOLIC BLOOD PRESSURE: 62 MMHG | HEART RATE: 84 BPM | OXYGEN SATURATION: 98 % | BODY MASS INDEX: 24.4 KG/M2 | WEIGHT: 151.8 LBS | RESPIRATION RATE: 16 BRPM

## 2023-09-15 DIAGNOSIS — R10.9 CRAMPING AFFECTING PREGNANCY, ANTEPARTUM: ICD-10-CM

## 2023-09-15 DIAGNOSIS — O26.899 CRAMPING AFFECTING PREGNANCY, ANTEPARTUM: ICD-10-CM

## 2023-09-15 DIAGNOSIS — O36.5930 POOR FETAL GROWTH AFFECTING MANAGEMENT OF MOTHER IN THIRD TRIMESTER, SINGLE OR UNSPECIFIED FETUS: Primary | ICD-10-CM

## 2023-09-15 DIAGNOSIS — Z36.86 ENCOUNTER FOR ANTENATAL SCREENING FOR CERVICAL LENGTH: ICD-10-CM

## 2023-09-15 DIAGNOSIS — Z3A.33 33 WEEKS GESTATION OF PREGNANCY: ICD-10-CM

## 2023-09-15 PROCEDURE — 76817 TRANSVAGINAL US OBSTETRIC: CPT | Performed by: OBSTETRICS & GYNECOLOGY

## 2023-09-15 PROCEDURE — 76820 UMBILICAL ARTERY ECHO: CPT | Performed by: OBSTETRICS & GYNECOLOGY

## 2023-09-15 PROCEDURE — 76815 OB US LIMITED FETUS(S): CPT | Performed by: OBSTETRICS & GYNECOLOGY

## 2023-09-15 PROCEDURE — 59025 FETAL NON-STRESS TEST: CPT | Performed by: OBSTETRICS & GYNECOLOGY

## 2023-09-15 NOTE — PROGRESS NOTES
Ultrasound Probe Disinfection    A transvaginal ultrasound was performed. Prior to use, disinfection was performed with High Level Disinfection Process ([email or web address]). Probe serial number [id number]: [id number] was used.       Treesa Gannonant  09/15/23  11:42 AM

## 2023-09-15 NOTE — PROGRESS NOTES
Repeat Non-Stress Testing:    Patient verbalizes +FM. Pt denies ALL:               Leaking of fluid   Contractions   Vaginal bleeding   Decreased fetal movement    Patient is performing daily kick counts. Patient has no questions or concerns. NST strip reviewed by Dr. Wally Dunne.

## 2023-09-15 NOTE — LETTER
September 15, 2023     [address] [facility] Laurent Marroquin  [address] [facility]) [place] [address]    Patient: Mervat Rodgerser   YOB: 1995   Date of Visit: 9/15/2023       Dear Laurent Marroquin: Thank you for referring Marietta Soni to me for evaluation. Below are my notes for this consultation. If you have questions, please do not hesitate to call me. I look forward to following your patient along with you.          Sincerely,        Roger Wright MD        CC: No Recipients Subjective:     Derick Maria is a 65 y.o. male presenting for preop eye surgery exam he is doing well with no c/o. Patient Active Problem List   Diagnosis Code    OA (osteoarthritis) M19.90    Nocturia R35.1    Other malaise and fatigue R53.81, R53.83    Hearing loss H91.90    Family history of melanoma Z80.8    Paroxysmal atrial fibrillation (HCC) I48.0     Patient Active Problem List    Diagnosis Date Noted    Paroxysmal atrial fibrillation ([facility] [address] 10/06/2016    Hearing loss 09/09/2014    Family history of melanoma 09/09/2014    Nocturia 07/03/2011    Other malaise and fatigue 07/03/2011    OA (osteoarthritis) 06/01/2010     Current Outpatient Prescriptions   Medication Sig Dispense Refill    MULTIVITAMIN PO Take  by mouth daily.  acetaminophen (TYLENOL) 500 mg tablet Take  by mouth every six (6) hours as needed for Pain.       diazepam (VALIUM) 5 mg tablet        Allergies   Allergen Reactions    Sulfa (Sulfonamide Antibiotics) Unknown (comments)     Past Medical History:   Diagnosis Date    Arrhythmia     Arthritis     Hearing difficulty     Ill-defined condition     spinal stenosis    Nocturia 7/3/2011    Other malaise and fatigue 7/3/2011     Past Surgical History:   Procedure Laterality Date    HX APPENDECTOMY      at age 7   [de-identified] OTHER SURGICAL  2004    colonoscopy     Family History   Problem Relation Age of Onset    Colon Cancer Father     Heart Disease Father     Cancer Brother      Social History   Substance Use Topics    Smoking status: Never Smoker    Smokeless tobacco: Never Used    Alcohol use 6.0 oz/week     7 Glasses of wine, 3 Standard drinks or equivalent per week      Comment: per week             Review of Systems  Constitutional: negative  Eyes: negative  Ears, nose, mouth, throat, and face: negative  Respiratory: negative  Cardiovascular: negative  Gastrointestinal: negative  Genitourinary:negative  Integument/breast: negative  Musculoskeletal:negative    Objective:     Visit Vitals    /62 (BP 1 Location: Right arm, BP Patient Position: Sitting)    Pulse (!) 52    Temp 97.7 °F (36.5 °C) (Oral)    Resp 24    Ht 6' (1.829 m)    Wt 155 lb 3.2 oz (70.4 kg)    SpO2 98%    BMI 21.05 kg/m2     Physical exam:   General appearance - alert, well appearing, and in no distress  Mental status - alert, oriented to person, place, and time  Eyes - pupils equal and reactive, extraocular eye movements intact  Ears - bilateral TM's and external ear canals normal  Nose - normal and patent, no erythema, discharge or polyps  Mouth - mucous membranes moist, pharynx normal without lesions  Neck - supple, no significant adenopathy, carotids upstroke normal bilaterally, no bruits, thyroid exam: thyroid is normal in size without nodules or tenderness  Chest - clear to auscultation, no wheezes, rales or rhonchi, symmetric air entry  Heart - normal rate, regular rhythm, normal S1, S2, no murmurs, rubs, clicks or gallops  Abdomen - soft, nontender, nondistended, no masses or organomegaly  Musculoskeletal - no joint tenderness, deformity or swelling  Extremities - peripheral pulses normal, no pedal edema, no clubbing or cyanosis       Assessment/Plan:     Well Male Exam  Cleared for Surgery  continue present plan, routine labs ordered, call if any problems. Brittani Mccoy was seen today for well male and immunization/injection. Diagnoses and all orders for this visit:    Physical exam, annual  -     LIPID PANEL  -     METABOLIC PANEL, COMPREHENSIVE  -     CBC WITH AUTOMATED DIFF  -     AMB POC URINALYSIS DIP STICK AUTO W/O MICRO    Paroxysmal atrial fibrillation (HCC),for ablation in 1 week,per EP    Nocturia  -     PROSTATE SPECIFIC AG    Primary osteoarthritis involving multiple joints    Lumbar spinal stenosis,for CAMILA 2 weeks    Follow-up Disposition:  Return in about 6 months (around 10/13/2017). Dianna De Los Santos

## 2023-09-18 ENCOUNTER — PATIENT MESSAGE (OUTPATIENT)
Dept: OBGYN CLINIC | Facility: CLINIC | Age: 28
End: 2023-09-18

## 2023-09-19 NOTE — PROGRESS NOTES
OB/GYN  PN Visit  Cheryl Gordon  [id number]  2023  1:01 PM  BELL Art    S: 32 y.o. [id number] 34w5d here for PN visit. Pregnancy complicated by everyday vaping, anxiety, depression, first trimester bleeding, beta thal carrier, and hx of PTD at 28 and 36 weeks and hx of 23 week  demise after ROM and cervical insufficiency. She declined cerclage with this pregnancy. OB complaints:  Denies c/o n/v/ha, no edema, no smoking, no DV. No vb/lof  No cramping/ctxns or signs of PTL. O:    Pre-Rosanne Vitals    Flowsheet Row Most Recent Value   Prenatal Assessment    Fetal Heart Rate 155   Fundal Height (cm) 32 cm   Movement Present   Prenatal Vitals    Blood Pressure 120/82   Weight - Scale 70.6 kg (155 lb 9.6 oz)   Urine Albumin/Glucose    Dilation/Effacement/Station    Vaginal Drainage    Draining Fluid No   Edema    LLE Edema None   RLE Edema None            Gen: no acute distress, nonlabored breathing. OB exam completed: fundal height, +FHT. Urine: -/-    A/P:  #1. 34w5d GESTATION    Labor precautions reviewed  Fetal kick counts reviewed  Tdap: given at previous appt. Advised patient to complete prenatal blood work, including pre-e labs. Yellow packet given and consents signed   Breast pump: order placed. Pediatrician: +  Contraception: Nexplanon in postpartum prior to dc'd.    RTC in 2 weeks        BELL Art  2023  1:01 PM

## 2023-09-21 ENCOUNTER — ROUTINE PRENATAL (OUTPATIENT)
Dept: OBGYN CLINIC | Facility: CLINIC | Age: 28
End: 2023-09-21
Payer: COMMERCIAL

## 2023-09-21 VITALS
HEIGHT: 66 IN | SYSTOLIC BLOOD PRESSURE: 120 MMHG | DIASTOLIC BLOOD PRESSURE: 82 MMHG | WEIGHT: 155.6 LBS | BODY MASS INDEX: 25.01 KG/M2

## 2023-09-21 DIAGNOSIS — O36.5930 POOR FETAL GROWTH AFFECTING MANAGEMENT OF MOTHER IN THIRD TRIMESTER, SINGLE OR UNSPECIFIED FETUS: ICD-10-CM

## 2023-09-21 DIAGNOSIS — D56.3 CARRIER OF BETA THALASSEMIA: ICD-10-CM

## 2023-09-21 DIAGNOSIS — Z3A.34 34 WEEKS GESTATION OF PREGNANCY: Primary | ICD-10-CM

## 2023-09-21 DIAGNOSIS — O09.219 PREVIOUS PRETERM DELIVERY, ANTEPARTUM: ICD-10-CM

## 2023-09-21 PROCEDURE — 99213 OFFICE O/P EST LOW 20 MIN: CPT

## 2023-09-22 ENCOUNTER — ULTRASOUND (OUTPATIENT)
Facility: HOSPITAL | Age: 28
End: 2023-09-22
Payer: COMMERCIAL

## 2023-09-22 VITALS
HEIGHT: 66 IN | WEIGHT: 154.6 LBS | SYSTOLIC BLOOD PRESSURE: 110 MMHG | BODY MASS INDEX: 24.85 KG/M2 | DIASTOLIC BLOOD PRESSURE: 62 MMHG

## 2023-09-22 DIAGNOSIS — O36.5930 POOR FETAL GROWTH AFFECTING MANAGEMENT OF MOTHER IN THIRD TRIMESTER, SINGLE OR UNSPECIFIED FETUS: ICD-10-CM

## 2023-09-22 DIAGNOSIS — Z3A.34 34 WEEKS GESTATION OF PREGNANCY: Primary | ICD-10-CM

## 2023-09-22 PROCEDURE — 76820 UMBILICAL ARTERY ECHO: CPT | Performed by: NURSE PRACTITIONER

## 2023-09-22 PROCEDURE — 99214 OFFICE O/P EST MOD 30 MIN: CPT | Performed by: NURSE PRACTITIONER

## 2023-09-22 PROCEDURE — 59025 FETAL NON-STRESS TEST: CPT | Performed by: NURSE PRACTITIONER

## 2023-09-22 PROCEDURE — 76815 OB US LIMITED FETUS(S): CPT | Performed by: NURSE PRACTITIONER

## 2023-09-22 NOTE — PROGRESS NOTES
[address] [facility] [address]: Ms. Danie Washington was seen today at 74L8D for umbilical artery Doppler study, YOVANNY, and NST. See ultrasound report under "OB Procedures" tab. She reports good fetal movement and has no concerns today. Please don't hesitate to contact our office with any concerns or questions.  -BELL Rubio    . I spent 10 minutes devoted to patient care (3 min chart preparation, 4 minutes face to face and 3 minutes documenting).

## 2023-09-22 NOTE — PROGRESS NOTES
Repeat Non-Stress Testing:    Patient verbalizes +FM. Pt denies ALL:               Leaking of fluid   Contractions   Vaginal bleeding   Decreased fetal movement    Patient is performing daily kick counts. Patient has no questions or concerns. NST strip reviewed by BELL Ansari.

## 2023-10-10 DIAGNOSIS — D56.3 CARRIER OF BETA THALASSEMIA: ICD-10-CM

## 2023-10-10 DIAGNOSIS — Z3A.16 16 WEEKS GESTATION OF PREGNANCY: ICD-10-CM

## 2023-10-10 DIAGNOSIS — O09.219 PREVIOUS PRETERM DELIVERY, ANTEPARTUM: ICD-10-CM

## 2023-10-11 RX ORDER — FERROUS SULFATE 324(65)MG
324 TABLET, DELAYED RELEASE (ENTERIC COATED) ORAL
Qty: 60 TABLET | Refills: 0 | Status: SHIPPED | OUTPATIENT
Start: 2023-10-11

## 2023-10-11 RX ORDER — PROGESTERONE 200 MG/1
200 CAPSULE ORAL
Qty: 30 CAPSULE | Refills: 0 | Status: SHIPPED | OUTPATIENT
Start: 2023-10-11 | End: 2023-10-12

## 2023-10-12 ENCOUNTER — HOSPITAL ENCOUNTER (OUTPATIENT)
Facility: HOSPITAL | Age: 28
Discharge: HOME | DRG: 560 | End: 2023-10-12
Attending: OBSTETRICS & GYNECOLOGY | Admitting: OBSTETRICS & GYNECOLOGY
Payer: COMMERCIAL

## 2023-10-12 ENCOUNTER — ROUTINE PRENATAL (OUTPATIENT)
Dept: OBGYN CLINIC | Facility: CLINIC | Age: 28
End: 2023-10-12

## 2023-10-12 VITALS — DIASTOLIC BLOOD PRESSURE: 70 MMHG | SYSTOLIC BLOOD PRESSURE: 120 MMHG | BODY MASS INDEX: 25.34 KG/M2 | WEIGHT: 157 LBS

## 2023-10-12 VITALS
SYSTOLIC BLOOD PRESSURE: 129 MMHG | DIASTOLIC BLOOD PRESSURE: 66 MMHG | TEMPERATURE: 98.3 F | HEIGHT: 66 IN | WEIGHT: 157 LBS | BODY MASS INDEX: 25.23 KG/M2 | HEART RATE: 82 BPM | RESPIRATION RATE: 18 BRPM

## 2023-10-12 DIAGNOSIS — O36.5930 POOR FETAL GROWTH AFFECTING MANAGEMENT OF MOTHER IN THIRD TRIMESTER, SINGLE OR UNSPECIFIED FETUS: Primary | ICD-10-CM

## 2023-10-12 DIAGNOSIS — Z3A.37 37 WEEKS GESTATION OF PREGNANCY: ICD-10-CM

## 2023-10-12 DIAGNOSIS — Z3A.33 33 WEEKS GESTATION OF PREGNANCY: ICD-10-CM

## 2023-10-12 LAB
ABO GROUP BLD: NORMAL
AMPHETAMINES SERPL QL SCN: NEGATIVE
BACTERIA UR QL AUTO: ABNORMAL /HPF
BARBITURATES UR QL: NEGATIVE
BASOPHILS # BLD AUTO: 0.02 THOUSANDS/ÂΜL (ref 0–0.1)
BASOPHILS NFR BLD AUTO: 0 % (ref 0–1)
BENZODIAZ UR QL: NEGATIVE
BILIRUB UR QL STRIP: NEGATIVE
BLD GP AB SCN SERPL QL: NEGATIVE
CLARITY UR: CLEAR
COCAINE UR QL: NEGATIVE
COLOR UR: YELLOW
EOSINOPHIL # BLD AUTO: 0.04 THOUSAND/ÂΜL (ref 0–0.61)
EOSINOPHIL NFR BLD AUTO: 1 % (ref 0–6)
ERYTHROCYTE [DISTWIDTH] IN BLOOD BY AUTOMATED COUNT: 13.4 % (ref 11.6–15.1)
EXTERNAL GROUP B STREP ANTIGEN: NORMAL
GLUCOSE UR STRIP-MCNC: NEGATIVE MG/DL
HBV SURFACE AB SER-ACNC: 168 MIU/ML
HBV SURFACE AG SER QL: NORMAL
HCT VFR BLD AUTO: 36.6 % (ref 34.8–46.1)
HCV AB SER QL: NORMAL
HGB BLD-MCNC: 11.9 G/DL (ref 11.5–15.4)
HGB UR QL STRIP.AUTO: NEGATIVE
HIV 1+2 AB+HIV1 P24 AG SERPL QL IA: NORMAL
HIV 2 AB SERPL QL IA: NORMAL
HIV1 AB SERPL QL IA: NORMAL
HIV1 P24 AG SERPL QL IA: NORMAL
IMM GRANULOCYTES # BLD AUTO: 0.06 THOUSAND/UL (ref 0–0.2)
IMM GRANULOCYTES NFR BLD AUTO: 1 % (ref 0–2)
KETONES UR STRIP-MCNC: ABNORMAL MG/DL
LEUKOCYTE ESTERASE UR QL STRIP: NEGATIVE
LYMPHOCYTES # BLD AUTO: 1.55 THOUSANDS/ÂΜL (ref 0.6–4.47)
LYMPHOCYTES NFR BLD AUTO: 21 % (ref 14–44)
MCH RBC QN AUTO: 31.9 PG (ref 26.8–34.3)
MCHC RBC AUTO-ENTMCNC: 32.5 G/DL (ref 31.4–37.4)
MCV RBC AUTO: 98 FL (ref 82–98)
METHADONE UR QL: NEGATIVE
MONOCYTES # BLD AUTO: 0.53 THOUSAND/ÂΜL (ref 0.17–1.22)
MONOCYTES NFR BLD AUTO: 7 % (ref 4–12)
MUCOUS THREADS UR QL AUTO: ABNORMAL
NEUTROPHILS # BLD AUTO: 5.22 THOUSANDS/ÂΜL (ref 1.85–7.62)
NEUTS SEG NFR BLD AUTO: 70 % (ref 43–75)
NITRITE UR QL STRIP: NEGATIVE
NON-SQ EPI CELLS URNS QL MICRO: ABNORMAL /HPF
NRBC BLD AUTO-RTO: 0 /100 WBCS
OPIATES UR QL SCN: NEGATIVE
OXYCODONE+OXYMORPHONE UR QL SCN: NEGATIVE
PCP UR QL: NEGATIVE
PH UR STRIP.AUTO: 6.5 [PH]
PLATELET # BLD AUTO: 197 THOUSANDS/UL (ref 149–390)
PMV BLD AUTO: 9.6 FL (ref 8.9–12.7)
PROT UR STRIP-MCNC: ABNORMAL MG/DL
RBC # BLD AUTO: 3.73 MILLION/UL (ref 3.81–5.12)
RBC #/AREA URNS AUTO: ABNORMAL /HPF
RH BLD: POSITIVE
RUBV IGG SERPL IA-ACNC: 74 IU/ML
SP GR UR STRIP.AUTO: 1.02 (ref 1–1.03)
SPECIMEN EXPIRATION DATE: NORMAL
THC UR QL: POSITIVE
TREPONEMA PALLIDUM IGG+IGM AB [PRESENCE] IN SERUM OR PLASMA BY IMMUNOASSAY: NORMAL
UROBILINOGEN UR STRIP-ACNC: 4 MG/DL
WBC # BLD AUTO: 7.42 THOUSAND/UL (ref 4.31–10.16)
WBC #/AREA URNS AUTO: ABNORMAL /HPF

## 2023-10-12 PROCEDURE — 86850 RBC ANTIBODY SCREEN: CPT

## 2023-10-12 PROCEDURE — 87086 URINE CULTURE/COLONY COUNT: CPT | Performed by: OBSTETRICS & GYNECOLOGY

## 2023-10-12 PROCEDURE — 86706 HEP B SURFACE ANTIBODY: CPT

## 2023-10-12 PROCEDURE — 86803 HEPATITIS C AB TEST: CPT

## 2023-10-12 PROCEDURE — 99213 OFFICE O/P EST LOW 20 MIN: CPT | Performed by: OBSTETRICS & GYNECOLOGY

## 2023-10-12 PROCEDURE — 86780 TREPONEMA PALLIDUM: CPT

## 2023-10-12 PROCEDURE — 87389 HIV-1 AG W/HIV-1&-2 AB AG IA: CPT

## 2023-10-12 PROCEDURE — 87340 HEPATITIS B SURFACE AG IA: CPT

## 2023-10-12 PROCEDURE — 85025 COMPLETE CBC W/AUTO DIFF WBC: CPT

## 2023-10-12 PROCEDURE — 86901 BLOOD TYPING SEROLOGIC RH(D): CPT

## 2023-10-12 PROCEDURE — 99213 OFFICE O/P EST LOW 20 MIN: CPT

## 2023-10-12 PROCEDURE — 86900 BLOOD TYPING SEROLOGIC ABO: CPT

## 2023-10-12 PROCEDURE — 86762 RUBELLA ANTIBODY: CPT

## 2023-10-12 PROCEDURE — 81001 URINALYSIS AUTO W/SCOPE: CPT

## 2023-10-12 PROCEDURE — 87150 DNA/RNA AMPLIFIED PROBE: CPT | Performed by: STUDENT IN AN ORGANIZED HEALTH CARE EDUCATION/TRAINING PROGRAM

## 2023-10-12 PROCEDURE — 87086 URINE CULTURE/COLONY COUNT: CPT

## 2023-10-12 PROCEDURE — 80307 DRUG TEST PRSMV CHEM ANLYZR: CPT | Performed by: OBSTETRICS & GYNECOLOGY

## 2023-10-12 NOTE — PROGRESS NOTES
Triage Note - OB  Vikram Brewer 25 y.o. female MRN: [id number]  Unit/Bed#: LD TRIAGE 4- Encounter: [id number]    Chief Complaint: sent from office for monitoring, fgr has not been able to follow up for appointments for monitoring due to lack of transportation JANES: Estimated Date of Delivery: 10/28/23    HPI: Patient is a [id number] at 37w5d here reporting good fm, no leaking of fluid, no bleeding, no contractions, some mucus discharge. (Patient checked in office no rom and 4 cm)   Patient teary reports must leave to move kids into shelter, reports will be able to return tomorrow. Has children and youth  assisting her with this situation. She reports she will return tomorrow around 2 pm for induction. Vitals: @VS  Body mass index is 25.34 kg/m². Vitals:    10/12/23 1414 10/12/23 1427   BP:  129/66   Pulse:  82   Resp: 18    Temp: 98.3 °F (36.8 °C)    TempSrc: Oral    Weight: 71.2 kg (157 lb)    Height: 5' 6" (1.676 m)        Physical Exam    FHR: 120' moderate variability with accelerations and no decelerations  [place]: rare    YOVANNY: 11, vtx, post placenta    Labs: No results found for this or any previous visit (from the past 24 hour(s)). Lab, Imaging and other studies: I have personally reviewed pertinent reports. A/P: 24 y/o  with fgr and transportation difficulties. Reporting must leave to get children settled into shelter. 1) Patient aware of concerns for current pregnancy, nst/yovanny reassuring at this time. Patient aware that she can return anytime as needed with any concerns for herself or for baby  2) prenatal labs drawn, tomift called for patient  3) Discharge instructions given to patient and labor precautions reviewed. She was added to schedule for tomorrow, she is aware to call and come at any time with any concerns. Plan for induction tomorrow for fgr.     Tracy Luna MD  10/12/2023  3:14 PM

## 2023-10-12 NOTE — PROGRESS NOTES
Claudia Flores is a 24 yo [id number] at 37 weeks and 5 days presenting for prenatal care-  Patient has been noting contractions and having possible fluid leakage. Denies any vaginal bleeding and endorses good fetal movement. Claudia Flores has a pregnancy complicated by shortened cervix and h/o  demise at 21 weeks gestation, FGR (7%ile) and social complexity that has made it difficult for her to present for prenatal care. Rupture work up negative in office- negative pooling, nitrazine or ferning. Some mucoid discharge noted no bleeding. Cervix is 4/ 50/-2, posterior, soft. GBS was collected in office and reviewed. Claudia Flores has not been seen since 34 weeks gestation and has been unable to follow up with her  center appointments secondary to transportation issues and unstable housing. I reviewed my concerns with Claudia Flores today that she has no9t been able to have fetal testing perfoprmed needed for FGR at this  point I offered our office to arrange her a Lyft to present to L&D for fetal monitoring, YOVANNY and umbilical dopplers. I reviewed considerations for delivery and recommendations for 38-39 weeks, however, in this socially complex patient consideration for keeping for induction may be considered as she has difficulty with arranging transportation to the hospital.  Claudia Flores is tearful today as she is currently applying for section 8 housing and does not feel ready for baby- I reassured her we will do the best that we can with our social work team to help her. Patient was agreeable to evaluation on L&D today.

## 2023-10-12 NOTE — PROGRESS NOTES
L&D Triage Note - OB/GYN  Miriam Martin 25 y.o. female MRN: [id number]  Unit/Bed#: LD TRIAGE 4-01 Encounter: [id number]      ASSESSMENT:    Miriam Martin is a 25 y.o. [id number] at 39w7d ***    PLAN:    1) IUGR  -NST reactive  -Patient declines recommendation to stay for IOL for IUGR at EFW 7%  -YOVANNY***  -Owing to overall reassuring fetal status in triage, it is acceptable for patient to return at different time  2) Inadequate prenatal care  -Patient has not presented to care since 9/22/23  -Prenatal panel not previously completed; collected today in triage  3) Homelessness  -Patient states she will be moving into housing tomorrow  -Declines case management needs  ***)  ***) Continue routine prenatal care  ***) Discharge from [facility] triage with pre***term*** labor precautions    - Reviewed rupture of membranes, false vs true labor, decreased fetal movement, and vaginal bleeding   - Pt to call provider with any concerns and follow up at her next scheduled prenatal appointment ***   - Case discussed with Dr. Preciado Speaker    SUBJECTIVE:    Miriam Martin 25 y.o. [id number] at 37w5d with an Estimated Date of Delivery: 10/28/23 with IUGR and social history significant for homelessness and inadequate prenatal care presenting to triage from clinic after a  3-week period without presenting to [facility] or primary OB. Patient had recommendation for weekly NST and YOVANNY and was unable to present owing to issues with transportation. She has been homeless and is currently planning on moving into a shelter tomorrow. She has a  who is already helping her with this. After discussion with [facility], patient received recommendation to stay for induction for IUGR with an adequate surveillance at term. However, patient declines to stay for induction, as she needs to go situate her children and prepare to move into the shelter tomorrow. Patient states that she will be able to return for further surveillance and induction.     Her current obstetrical history is significant for IUGR, inadequate prenatal care, homelessness    Her past obstetrical history is significant for 1 term , 3        OBJECTIVE:    Vitals:    10/12/23 1414   Resp: 18   Temp: 98.3 °F (36.8 °C)       ROS:  Constitutional: Negative***  Respiratory: Negative***  Cardiovascular: Negative***    Gastrointestinal: Negative***    General Physical Exam:  General: {EXAM; GENERAL APPEARANCE:5021}  Cardiovascular: {Mis exam cardio:85194}  Lungs: non-labored breathing***  Abdomen: {Exam; abdomen:5259}  Lower extremeties: {Desc; tender/non:82737}    Cervical Exam  Speculum: Cervical os is ***    SVE: {gen number 7-33:513020} / {Effacement :49014} / {SL IP OB Station:62173}       FHT:  Variability: Moderate 6-25 bpm    TOCO:        Lab Results   Component Value Date    WBC 6.71 04/15/2023    HGB 11.0 (L) 04/15/2023    HCT 34.8 04/15/2023     04/15/2023     Lab Results   Component Value Date    K 4.7 04/15/2023     04/15/2023    CO2 23 04/15/2023    BUN 9 04/15/2023    CREATININE 0.56 (L) 04/15/2023    AST 33 04/15/2023    ALT 14 04/15/2023       KOH/WTMT:     Infection:   - *** clue cells    - *** hyphae   - *** trichomonads present    Membrane status   - *** ferning   - *** nitrazene   - *** pooling     Urine Dip    - ***    Imaging:    Imaging: {Obstetrics; Triage U/S Reportin}         TVUS   - Cervical length    - ***cm    - ***cm    - ***cm   - Presentation: vertex*** breech***        Abd. US   YOVANNY      - Q1 ***cm     - Q2 ***cm     - Q3 ***cm     - Q4 ***cm     - Total: ***cm   Placenta: ***   Presentation: ***    Herminio Jolly MD,  OBGYN PGY-2  10/12/2023 2:41 PM

## 2023-10-12 NOTE — PROGRESS NOTES
Pt here for extended monitoring and dopplers and shruthi. Many social issues. Was on the phone with the fob. Did allow me to draw prenatal labs. Pt states she needs to go home and tend to her 3year old who is with the fob. Pt has transportation issues and a lyft was called for her to go home.  Plan is for her to return tomorrow for induction of labor around 2 pm.

## 2023-10-13 ENCOUNTER — HOSPITAL ENCOUNTER (OUTPATIENT)
Dept: LABOR AND DELIVERY | Facility: HOSPITAL | Age: 28
Discharge: HOME | End: 2023-10-13
Payer: COMMERCIAL

## 2023-10-13 ENCOUNTER — HOSPITAL ENCOUNTER (INPATIENT)
Facility: HOSPITAL | Age: 28
LOS: 3 days | Discharge: HOME | DRG: 560 | End: 2023-10-16
Attending: STUDENT IN AN ORGANIZED HEALTH CARE EDUCATION/TRAINING PROGRAM | Admitting: STUDENT IN AN ORGANIZED HEALTH CARE EDUCATION/TRAINING PROGRAM
Payer: COMMERCIAL

## 2023-10-13 ENCOUNTER — TELEPHONE (OUTPATIENT)
Dept: LABOR AND DELIVERY | Facility: HOSPITAL | Age: 28
End: 2023-10-13

## 2023-10-13 ENCOUNTER — ANESTHESIA (INPATIENT)
Dept: ANESTHESIOLOGY | Facility: HOSPITAL | Age: 28
End: 2023-10-13
Payer: COMMERCIAL

## 2023-10-13 ENCOUNTER — ANESTHESIA EVENT (INPATIENT)
Dept: ANESTHESIOLOGY | Facility: HOSPITAL | Age: 28
End: 2023-10-13
Payer: COMMERCIAL

## 2023-10-13 DIAGNOSIS — O09.30 HISTORY OF INADEQUATE PRENATAL CARE: ICD-10-CM

## 2023-10-13 PROBLEM — Z34.90 ENCOUNTER FOR INDUCTION OF LABOR: Status: ACTIVE | Noted: 2020-12-28

## 2023-10-13 LAB
BACTERIA UR CULT: NORMAL
BACTERIA UR CULT: NORMAL
GLUCOSE SERPL-MCNC: 78 MG/DL (ref 65–140)
HOLD SPECIMEN: NORMAL

## 2023-10-13 PROCEDURE — 4A1HXCZ MONITORING OF PRODUCTS OF CONCEPTION, CARDIAC RATE, EXTERNAL APPROACH: ICD-10-PCS | Performed by: STUDENT IN AN ORGANIZED HEALTH CARE EDUCATION/TRAINING PROGRAM

## 2023-10-13 PROCEDURE — 10907ZC DRAINAGE OF AMNIOTIC FLUID, THERAPEUTIC FROM PRODUCTS OF CONCEPTION, VIA NATURAL OR ARTIFICIAL OPENING: ICD-10-PCS | Performed by: STUDENT IN AN ORGANIZED HEALTH CARE EDUCATION/TRAINING PROGRAM

## 2023-10-13 PROCEDURE — 82948 REAGENT STRIP/BLOOD GLUCOSE: CPT

## 2023-10-13 PROCEDURE — NC001 PR NO CHARGE: Performed by: STUDENT IN AN ORGANIZED HEALTH CARE EDUCATION/TRAINING PROGRAM

## 2023-10-13 PROCEDURE — 3E033VJ INTRODUCTION OF OTHER HORMONE INTO PERIPHERAL VEIN, PERCUTANEOUS APPROACH: ICD-10-PCS | Performed by: STUDENT IN AN ORGANIZED HEALTH CARE EDUCATION/TRAINING PROGRAM

## 2023-10-13 RX ORDER — ROPIVACAINE HYDROCHLORIDE 2 MG/ML
INJECTION, SOLUTION EPIDURAL; INFILTRATION; PERINEURAL AS NEEDED
Status: DISCONTINUED | OUTPATIENT
Start: 2023-10-13 | End: 2023-10-14 | Stop reason: HOSPADM

## 2023-10-13 RX ORDER — ONDANSETRON 2 MG/ML
4 INJECTION INTRAMUSCULAR; INTRAVENOUS EVERY 6 HOURS PRN
Status: DISCONTINUED | OUTPATIENT
Start: 2023-10-13 | End: 2023-10-14

## 2023-10-13 RX ORDER — BUPIVACAINE HYDROCHLORIDE 2.5 MG/ML
30 INJECTION, SOLUTION EPIDURAL; INFILTRATION; INTRACAUDAL ONCE AS NEEDED
Status: DISCONTINUED | OUTPATIENT
Start: 2023-10-13 | End: 2023-10-14

## 2023-10-13 RX ORDER — NALBUPHINE HYDROCHLORIDE 10 MG/ML
10 INJECTION, SOLUTION INTRAMUSCULAR; INTRAVENOUS; SUBCUTANEOUS
Status: DISCONTINUED | OUTPATIENT
Start: 2023-10-13 | End: 2023-10-14

## 2023-10-13 RX ORDER — OXYTOCIN/RINGER'S LACTATE 30/500 ML
1-30 PLASTIC BAG, INJECTION (ML) INTRAVENOUS
Status: DISCONTINUED | OUTPATIENT
Start: 2023-10-13 | End: 2023-10-14

## 2023-10-13 RX ORDER — DIPHENHYDRAMINE HYDROCHLORIDE 50 MG/ML
25 INJECTION INTRAMUSCULAR; INTRAVENOUS EVERY 6 HOURS PRN
Status: DISCONTINUED | OUTPATIENT
Start: 2023-10-13 | End: 2023-10-14

## 2023-10-13 RX ORDER — SODIUM CHLORIDE, SODIUM LACTATE, POTASSIUM CHLORIDE, CALCIUM CHLORIDE 600; 310; 30; 20 MG/100ML; MG/100ML; MG/100ML; MG/100ML
125 INJECTION, SOLUTION INTRAVENOUS CONTINUOUS
Status: DISCONTINUED | OUTPATIENT
Start: 2023-10-13 | End: 2023-10-14

## 2023-10-13 RX ORDER — LIDOCAINE HYDROCHLORIDE AND EPINEPHRINE 15; 5 MG/ML; UG/ML
INJECTION, SOLUTION EPIDURAL
Status: COMPLETED | OUTPATIENT
Start: 2023-10-13 | End: 2023-10-13

## 2023-10-13 RX ADMIN — SODIUM CHLORIDE, SODIUM LACTATE, POTASSIUM CHLORIDE, AND CALCIUM CHLORIDE 125 ML/HR: .6; .31; .03; .02 INJECTION, SOLUTION INTRAVENOUS at 22:04

## 2023-10-13 RX ADMIN — ROPIVACAINE HYDROCHLORIDE 5 MG: 2 INJECTION EPIDURAL; INFILTRATION; PERINEURAL at 22:00

## 2023-10-13 RX ADMIN — Medication 2 MILLI-UNITS/MIN: at 20:02

## 2023-10-13 RX ADMIN — ROPIVACAINE HYDROCHLORIDE: 2 INJECTION, SOLUTION EPIDURAL; INFILTRATION at 22:51

## 2023-10-13 RX ADMIN — LIDOCAINE HYDROCHLORIDE AND EPINEPHRINE 3 ML: 15; 5 INJECTION, SOLUTION EPIDURAL at 21:57

## 2023-10-13 RX ADMIN — SODIUM CHLORIDE, SODIUM LACTATE, POTASSIUM CHLORIDE, AND CALCIUM CHLORIDE 125 ML/HR: .6; .31; .03; .02 INJECTION, SOLUTION INTRAVENOUS at 19:01

## 2023-10-13 NOTE — ASSESSMENT & PLAN NOTE
-Absence of presentation to appointments between 9/22/23 to 10/12/23  -In setting of homelessness  -Requires postpartum case management consult

## 2023-10-13 NOTE — H&P
[address] 25 y.o. female MRN: [id number]  Unit/Bed#: -01 Encounter: [id number]    Assessment: 25 y.o. [id number] at 37w6d admitted for IOL for IUGR in setting of complex social history with inadequate surveillance  SVE: Cervical Dilation: 5  Cervical Effacement: 80  Cervical Consistency: Soft  Fetal Station: -1  Position: Unknown  FHT: Cat I  Clinical EFW: 6 ; Cephalic confirmed by ultrasound  GBS status: pending       Plan:   * Poor fetal growth affecting management of mother in third trimester  Assessment & Plan  -On 23 EFW 7% with normal dopplers and inadequate APFS owing to complex social history  -After presentation to clinic yesterday with absence of prenatal care since 23, patient was sent to triage  -MFM evaluated and recommended induction of labor    Encounter for induction of labor  Assessment & Plan  Admit for IOL  T&S, CBC, RPR  CLD  IV fluids  GBS prophylaxis is not needed   Induction with Pitocin titration     History of inadequate prenatal care  Assessment & Plan  -Absence of presentation to appointments between 23 to 10/12/23  -In setting of homelessness  -Requires postpartum case management consult    Pregnancy with 37 weeks completed gestation  Assessment & Plan  -Prenatal labs collected 10/12/23  -Absent prenatal care between 23 to 10/12/23 in setting of homelessness and IUGR    Anxiety and depression  Assessment & Plan  Discuss antidepressants postpartum          Discussed case and plan w/ Dr. Rowdy Runao      Chief Complaint: here for induction    HPI: Yun Osorio is a 25 y.o. [id number] with an JANES of 10/28/2023, by Ultrasound at 37w6d who is being admitted . She feels intermittent contractions, has no LOF, and reports no VB. She states she has felt good FM.     Patient Active Problem List   Diagnosis    Previous  delivery, antepartum    Anxiety and depression    Carrier of beta thalassemia    Encounter for induction of labor    Postpartum depression    Poor fetal growth affecting management of mother in third trimester    Pregnancy with 37 weeks completed gestation    History of inadequate prenatal care       Baby complications/comments: IUGR    Review of Systems   Constitutional:  Negative for chills and fever. Respiratory:  Negative for cough and shortness of breath. Cardiovascular:  Negative for chest pain and leg swelling. Gastrointestinal:  Negative for abdominal pain, nausea and vomiting. Genitourinary:  Negative for dysuria, pelvic pain, urgency, vaginal bleeding and vaginal discharge. Neurological:  Negative for dizziness, light-headedness and headaches. All other systems reviewed and are negative. OB Hx:  OB History    Para Term  AB Living   8 4 1 3 3 3   SAB IAB Ectopic Multiple Live Births   3 0 0   4      # Outcome Date GA Lbr Turner/2nd Weight Sex Delivery Anes PTL Lv   8 Current            7  21 36w6d / 00:07 3025 g (6 lb 10.7 oz) M Vag-Spont EPI Y DONNY   6  20 23w3d  563 g (1 lb 3.9 oz) M Vag-Spont EPI  ND   5 SAB 19 9w5d             Birth Comments: D&C   4  19 35w0d  2722 g (6 lb) F Vag-Spont EPI  DONNY   3 SAB 2017 6w0d          2 SAB 2015 8w0d          1 Term 12 40w0d  3799 g (8 lb 6 oz) M Vag-Spont EPI N DONNY      Obstetric Comments   Age at menarche: 10   Age at first child: 23   Cramps: Yes   Coitarche: 13                Past Medical Hx:  Past Medical History:   Diagnosis Date    Anemia     on iron    Anxiety and depression 3/17/2020    Arthritis     Right leg    Eating disorder     Migraines     Frequent migraines, seems to affect the left side of her body.     Papanicolaou smear for cervical cancer screening 2019    Post partum depression     Psychiatric disorder     ptsd    Seasonal allergies      (spontaneous vaginal delivery) 6/3/2021    Varicella     as a child        Past Surgical hx:  Past Surgical History:   Procedure Laterality Date    DILATION AND CURETTAGE OF UTERUS         Social Hx:  Alcohol use: no  Tobacco use: no  Other substance use: THC on UDS    Allergies   Allergen Reactions    Other Wheezing     shellfish    Shellfish Allergy - Food Allergy Anxiety, Itching and Swelling       Medications Prior to Admission   Medication    ferrous sulfate 324 (65 Fe) mg    Prenatal Multivit-Min-Fe-FA (Pre- Formula) TABS    Acetaminophen (TYLENOL PO)       Objective:  Temp:  [98.5 °F (36.9 °C)-98.6 °F (37 °C)] 98.6 °F (37 °C)  HR:  [71-82] 79  Resp:  [18] 18  BP: (112-126)/(59-70) 117/62  Body mass index is 25.35 kg/m². Physical Exam:  Physical Exam  Constitutional:       Appearance: Normal appearance. Cardiovascular:      Rate and Rhythm: Normal rate and regular rhythm. Heart sounds: No murmur heard. No friction rub. No gallop. Pulmonary:      Effort: Pulmonary effort is normal. No respiratory distress. Breath sounds: No wheezing. Abdominal:      Palpations: Abdomen is soft. Tenderness: There is no abdominal tenderness. Musculoskeletal:         General: No swelling or tenderness. Neurological:      Mental Status: She is alert and oriented to person, place, and time. Skin:     General: Skin is warm and dry. Psychiatric:         Mood and Affect: Mood normal.   Vitals reviewed.             FHT:  Baseline Rate: 125 bpm  Variability: Moderate 6-25 bpm  Accelerations: 15 x 15 or greater, At variable times  Decelerations: None  FHR Category: Category I    TOCO:   Contraction Frequency (minutes): 6-8  Contraction Duration (seconds): 120-160  Contraction Quality: Moderate    Lab Results   Component Value Date    WBC 7.42 10/12/2023    HGB 11.9 10/12/2023    HCT 36.6 10/12/2023     10/12/2023     Lab Results   Component Value Date    K 4.7 04/15/2023     04/15/2023    CO2 23 04/15/2023    BUN 9 04/15/2023    CREATININE 0.56 (L) 04/15/2023    AST 33 04/15/2023    ALT 14 04/15/2023     Prenatal Labs: Reviewed      Bloodtype:O  Rh Factor (no units)   Date Value   10/12/2023 Positive     Strep Grp B PCR (no units)   Date Value   05/18/2021 Negative     HIV-1/HIV-2 Ab (no units)   Date Value   01/25/2021 Non-Reactive     Rubella IgG Quant (IU/mL)   Date Value   10/12/2023 74.0     No results found for: "VDRL"  RPR (no units)   Date Value   06/03/2021 Non-Reactive     Hepatitis B Surface Ag (no units)   Date Value   10/12/2023 Non-reactive     Chlamydia trachomatis, DNA Probe (no units)   Date Value   05/05/2023 Negative     N gonorrhoeae, DNA Probe (no units)   Date Value   05/05/2023 Negative     Glucose (mg/dL)   Date Value   03/23/2021 86       Platelets (Thousands/uL)   Date Value   10/12/2023 197   04/15/2023 226   06/25/2021 292   06/03/2021 149     Hemoglobin (g/dL)   Date Value   10/12/2023 11.9   04/15/2023 11.0 (L)   06/25/2021 12.0   06/03/2021 11.2 (L)     >2 Midnights  INPATIENT       Signature/Title:  Wiley Herrera MD  Date: 10/13/2023  Time: 9:32 PM

## 2023-10-13 NOTE — ASSESSMENT & PLAN NOTE
Admit for IOL  T&S, CBC, RPR  CLD  IV fluids  GBS prophylaxis is not needed   Induction with Pitocin titration

## 2023-10-13 NOTE — TELEPHONE ENCOUNTER
Called patient to ensure she was ok and coming for scheduled IOL  Answered and stated she was on her way to the hospital with her  and her son

## 2023-10-13 NOTE — ASSESSMENT & PLAN NOTE
Lochia WNL   Recovering well   Appropriate bowel and bladder function   Pain well controlled   Tolerating diet   Formula feeding  Ambulating without issues   Rh pos  Contraception: declined

## 2023-10-13 NOTE — ASSESSMENT & PLAN NOTE
-On 9/7/23 EFW 7% with normal dopplers and inadequate APFS owing to complex social history  -After presentation to clinic yesterday with absence of prenatal care since 9/22/23, patient was sent to triage  -MFM evaluated and recommended induction of labor

## 2023-10-14 LAB
ABO GROUP BLD BPU: NORMAL
APTT PPP: 29 SECONDS (ref 23–37)
BASE EXCESS BLDA CALC-SCNC: -2 MMOL/L (ref -2–3)
BASE EXCESS BLDCOA CALC-SCNC: -1.5 MMOL/L (ref 3–11)
BASE EXCESS BLDCOV CALC-SCNC: -3.2 MMOL/L (ref 1–9)
BPU ID: NORMAL
CA-I BLD-SCNC: 1.24 MMOL/L (ref 1.12–1.32)
ERYTHROCYTE [DISTWIDTH] IN BLOOD BY AUTOMATED COUNT: 13.4 % (ref 11.6–15.1)
FIBRINOGEN PPP-MCNC: 326 MG/DL (ref 207–520)
FIO2 GAS DIL.REBREATH: 21 L
GLUCOSE SERPL-MCNC: 78 MG/DL (ref 65–140)
HCO3 BLDA-SCNC: 21 MMOL/L (ref 22–28)
HCO3 BLDCOA-SCNC: 25.4 MMOL/L (ref 17.3–27.3)
HCO3 BLDCOV-SCNC: 20.8 MMOL/L (ref 12.2–28.6)
HCT VFR BLD AUTO: 34 % (ref 34.8–46.1)
HCT VFR BLD CALC: 31 % (ref 34.8–46.1)
HGB BLD-MCNC: 11.1 G/DL (ref 11.5–15.4)
HGB BLDA-MCNC: 10.5 G/DL (ref 11.5–15.4)
INR PPP: 0.9 (ref 0.84–1.19)
MCH RBC QN AUTO: 31.8 PG (ref 26.8–34.3)
MCHC RBC AUTO-ENTMCNC: 32.6 G/DL (ref 31.4–37.4)
MCV RBC AUTO: 97 FL (ref 82–98)
O2 CT VFR BLDCOA CALC: 8.4 ML/DL
OXYHGB MFR BLDCOA: 36.4 %
OXYHGB MFR BLDCOV: 80.8 %
PCO2 BLD: 109 MM HG
PCO2 BLD: 22 MMOL/L (ref 21–32)
PCO2 BLD: 29.6 MM HG (ref 36–44)
PCO2 BLDA: 29.2 MM HG
PCO2 BLDCOA: 50.8 MM[HG] (ref 30–60)
PCO2 BLDCOV: 34.9 MM HG (ref 27–43)
PH BLD: 7.46 [PH]
PH BLD: 7.46 [PH] (ref 7.35–7.45)
PH BLDCOA: 7.32 [PH] (ref 7.23–7.43)
PH BLDCOV: 7.39 [PH] (ref 7.19–7.49)
PLATELET # BLD AUTO: 186 THOUSANDS/UL (ref 149–390)
PMV BLD AUTO: 9.1 FL (ref 8.9–12.7)
PO2 BLD: 110 MM HG (ref 75–129)
PO2 BLDCOA: 16.4 MM HG (ref 5–25)
PO2 BLDCOV: 34 MM HG (ref 15–45)
POTASSIUM BLD-SCNC: 4 MMOL/L (ref 3.5–5.3)
PROTHROMBIN TIME: 12.7 SECONDS (ref 11.6–14.5)
RBC # BLD AUTO: 3.49 MILLION/UL (ref 3.81–5.12)
SAO2 % BLD FROM PO2: 99 % (ref 60–85)
SAO2 % BLDCOV: 18.6 ML/DL
SODIUM BLD-SCNC: 135 MMOL/L (ref 136–145)
SPECIMEN SOURCE: ABNORMAL
UNIT DISPENSE STATUS: NORMAL
UNIT PRODUCT CODE: NORMAL
UNIT PRODUCT VOLUME: 350 ML
UNIT RH: NORMAL
WBC # BLD AUTO: 9.83 THOUSAND/UL (ref 4.31–10.16)

## 2023-10-14 PROCEDURE — 85730 THROMBOPLASTIN TIME PARTIAL: CPT

## 2023-10-14 PROCEDURE — 82947 ASSAY GLUCOSE BLOOD QUANT: CPT

## 2023-10-14 PROCEDURE — 93005 ELECTROCARDIOGRAM TRACING: CPT

## 2023-10-14 PROCEDURE — 85027 COMPLETE CBC AUTOMATED: CPT

## 2023-10-14 PROCEDURE — 88307 TISSUE EXAM BY PATHOLOGIST: CPT | Performed by: STUDENT IN AN ORGANIZED HEALTH CARE EDUCATION/TRAINING PROGRAM

## 2023-10-14 PROCEDURE — 82330 ASSAY OF CALCIUM: CPT

## 2023-10-14 PROCEDURE — 85610 PROTHROMBIN TIME: CPT

## 2023-10-14 PROCEDURE — 84295 ASSAY OF SERUM SODIUM: CPT

## 2023-10-14 PROCEDURE — 36600 WITHDRAWAL OF ARTERIAL BLOOD: CPT

## 2023-10-14 PROCEDURE — 85014 HEMATOCRIT: CPT

## 2023-10-14 PROCEDURE — 59409 OBSTETRICAL CARE: CPT | Performed by: STUDENT IN AN ORGANIZED HEALTH CARE EDUCATION/TRAINING PROGRAM

## 2023-10-14 PROCEDURE — 99232 SBSQ HOSP IP/OBS MODERATE 35: CPT | Performed by: STUDENT IN AN ORGANIZED HEALTH CARE EDUCATION/TRAINING PROGRAM

## 2023-10-14 PROCEDURE — 84132 ASSAY OF SERUM POTASSIUM: CPT

## 2023-10-14 PROCEDURE — 82805 BLOOD GASES W/O2 SATURATION: CPT | Performed by: STUDENT IN AN ORGANIZED HEALTH CARE EDUCATION/TRAINING PROGRAM

## 2023-10-14 PROCEDURE — 82803 BLOOD GASES ANY COMBINATION: CPT

## 2023-10-14 PROCEDURE — 85384 FIBRINOGEN ACTIVITY: CPT

## 2023-10-14 RX ORDER — CARBOPROST TROMETHAMINE 250 UG/ML
INJECTION, SOLUTION INTRAMUSCULAR
Status: DISCONTINUED
Start: 2023-10-14 | End: 2023-10-14 | Stop reason: WASHOUT

## 2023-10-14 RX ORDER — METHYLERGONOVINE MALEATE 0.2 MG/ML
0.2 INJECTION INTRAVENOUS ONCE
Status: COMPLETED | OUTPATIENT
Start: 2023-10-14 | End: 2023-10-14

## 2023-10-14 RX ORDER — HYDROMORPHONE HCL/PF 1 MG/ML
1 SYRINGE (ML) INJECTION ONCE
Status: COMPLETED | OUTPATIENT
Start: 2023-10-14 | End: 2023-10-14

## 2023-10-14 RX ORDER — DIPHENHYDRAMINE HYDROCHLORIDE 50 MG/ML
25 INJECTION INTRAMUSCULAR; INTRAVENOUS EVERY 6 HOURS PRN
Status: DISCONTINUED | OUTPATIENT
Start: 2023-10-14 | End: 2023-10-16 | Stop reason: HOSPADM

## 2023-10-14 RX ORDER — IBUPROFEN 600 MG/1
600 TABLET ORAL EVERY 6 HOURS PRN
Status: DISCONTINUED | OUTPATIENT
Start: 2023-10-14 | End: 2023-10-16 | Stop reason: HOSPADM

## 2023-10-14 RX ORDER — DOCUSATE SODIUM 100 MG/1
100 CAPSULE, LIQUID FILLED ORAL 2 TIMES DAILY
Status: DISCONTINUED | OUTPATIENT
Start: 2023-10-14 | End: 2023-10-16 | Stop reason: HOSPADM

## 2023-10-14 RX ORDER — DIAPER,BRIEF,INFANT-TODD,DISP
1 EACH MISCELLANEOUS DAILY PRN
Status: DISCONTINUED | OUTPATIENT
Start: 2023-10-14 | End: 2023-10-16 | Stop reason: HOSPADM

## 2023-10-14 RX ORDER — METHYLERGONOVINE MALEATE 0.2 MG/ML
INJECTION INTRAVENOUS
Status: COMPLETED
Start: 2023-10-14 | End: 2023-10-14

## 2023-10-14 RX ORDER — CALCIUM CARBONATE 500 MG/1
1000 TABLET, CHEWABLE ORAL DAILY PRN
Status: DISCONTINUED | OUTPATIENT
Start: 2023-10-14 | End: 2023-10-16 | Stop reason: HOSPADM

## 2023-10-14 RX ORDER — SIMETHICONE 80 MG
80 TABLET,CHEWABLE ORAL 4 TIMES DAILY PRN
Status: DISCONTINUED | OUTPATIENT
Start: 2023-10-14 | End: 2023-10-16 | Stop reason: HOSPADM

## 2023-10-14 RX ORDER — OXYTOCIN/RINGER'S LACTATE 30/500 ML
250 PLASTIC BAG, INJECTION (ML) INTRAVENOUS ONCE
Status: DISCONTINUED | OUTPATIENT
Start: 2023-10-14 | End: 2023-10-16 | Stop reason: HOSPADM

## 2023-10-14 RX ORDER — TRANEXAMIC ACID 10 MG/ML
1000 INJECTION, SOLUTION INTRAVENOUS ONCE
Status: DISCONTINUED | OUTPATIENT
Start: 2023-10-14 | End: 2023-10-14

## 2023-10-14 RX ORDER — SENNOSIDES 8.6 MG
1 TABLET ORAL DAILY
Status: DISCONTINUED | OUTPATIENT
Start: 2023-10-14 | End: 2023-10-16 | Stop reason: HOSPADM

## 2023-10-14 RX ORDER — TRANEXAMIC ACID 10 MG/ML
1000 INJECTION, SOLUTION INTRAVENOUS ONCE
Status: COMPLETED | OUTPATIENT
Start: 2023-10-14 | End: 2023-10-14

## 2023-10-14 RX ORDER — ONDANSETRON 2 MG/ML
4 INJECTION INTRAMUSCULAR; INTRAVENOUS EVERY 8 HOURS PRN
Status: DISCONTINUED | OUTPATIENT
Start: 2023-10-14 | End: 2023-10-16 | Stop reason: HOSPADM

## 2023-10-14 RX ORDER — METHYLERGONOVINE MALEATE 0.2 MG/ML
INJECTION INTRAVENOUS
Status: DISPENSED
Start: 2023-10-14 | End: 2023-10-14

## 2023-10-14 RX ORDER — CEFOXITIN SODIUM 2 G/50ML
2000 INJECTION, SOLUTION INTRAVENOUS ONCE
Status: COMPLETED | OUTPATIENT
Start: 2023-10-14 | End: 2023-10-14

## 2023-10-14 RX ORDER — ACETAMINOPHEN 325 MG/1
650 TABLET ORAL EVERY 4 HOURS PRN
Status: DISCONTINUED | OUTPATIENT
Start: 2023-10-14 | End: 2023-10-16 | Stop reason: HOSPADM

## 2023-10-14 RX ADMIN — METHYLERGONOVINE MALEATE 0.2 MG: 0.2 INJECTION INTRAVENOUS at 12:33

## 2023-10-14 RX ADMIN — IBUPROFEN 600 MG: 600 TABLET, FILM COATED ORAL at 05:26

## 2023-10-14 RX ADMIN — TRANEXAMIC ACID 1000 MG: 10 INJECTION, SOLUTION INTRAVENOUS at 12:21

## 2023-10-14 RX ADMIN — HYDROCORTISONE 1 APPLICATION: 1 CREAM TOPICAL at 04:30

## 2023-10-14 RX ADMIN — HYDROMORPHONE HYDROCHLORIDE 1 MG: 1 INJECTION, SOLUTION INTRAMUSCULAR; INTRAVENOUS; SUBCUTANEOUS at 12:21

## 2023-10-14 RX ADMIN — CEFOXITIN SODIUM 2000 MG: 2 INJECTION, SOLUTION INTRAVENOUS at 13:58

## 2023-10-14 RX ADMIN — IBUPROFEN 600 MG: 600 TABLET, FILM COATED ORAL at 12:48

## 2023-10-14 RX ADMIN — DOCUSATE SODIUM 100 MG: 100 CAPSULE, LIQUID FILLED ORAL at 08:41

## 2023-10-14 RX ADMIN — ONDANSETRON 4 MG: 2 INJECTION INTRAMUSCULAR; INTRAVENOUS at 13:01

## 2023-10-14 RX ADMIN — BENZOCAINE AND LEVOMENTHOL 1 APPLICATION: 200; 5 SPRAY TOPICAL at 04:31

## 2023-10-14 RX ADMIN — SENNOSIDES 8.6 MG: 8.6 TABLET, FILM COATED ORAL at 08:41

## 2023-10-14 RX ADMIN — ACETAMINOPHEN 650 MG: 325 TABLET, FILM COATED ORAL at 05:24

## 2023-10-14 RX ADMIN — WITCH HAZEL 1 PAD: 500 SOLUTION RECTAL; TOPICAL at 04:30

## 2023-10-14 RX ADMIN — SODIUM CHLORIDE, SODIUM LACTATE, POTASSIUM CHLORIDE, AND CALCIUM CHLORIDE 1000 ML: .6; .31; .03; .02 INJECTION, SOLUTION INTRAVENOUS at 11:55

## 2023-10-14 NOTE — PLAN OF CARE
Problem: POSTPARTUM  Goal: Experiences normal postpartum course  Description: INTERVENTIONS:  - Monitor maternal vital signs  - Assess uterine involution and lochia  10/14/2023 0245 by Nerissa Paget, RN  Outcome: Progressing  10/14/2023 0244 by Nerissa Paget, RN  Outcome: Progressing  Goal: Appropriate maternal -  bonding  Description: INTERVENTIONS:  - Identify family support  - Assess for appropriate maternal/infant bonding   -Encourage maternal/infant bonding opportunities  - Referral to  or  as needed  10/14/2023 0245 by Nerissa Paget, RN  Outcome: Progressing  10/14/2023 0244 by Nerissa Paget, RN  Outcome: Progressing  Goal: Establishment of infant feeding pattern  Description: INTERVENTIONS:  - Assess breast/bottle feeding  - Refer to lactation as needed  10/14/2023 0245 by Nerissa Paget, RN  Outcome: Progressing  10/14/2023 0244 by Nerissa Paget, RN  Outcome: Progressing     Problem: PAIN - ADULT  Goal: Verbalizes/displays adequate comfort level or baseline comfort level  Description: Interventions:  - Encourage patient to monitor pain and request assistance  - Assess pain using appropriate pain scale  - Administer analgesics based on type and severity of pain and evaluate response  - Implement non-pharmacological measures as appropriate and evaluate response  - Consider cultural and social influences on pain and pain management  - Notify physician/advanced practitioner if interventions unsuccessful or patient reports new pain  10/14/2023 0245 by Nerissa Paget, RN  Outcome: Progressing  10/14/2023 0244 by Nerissa Paget, RN  Outcome: Progressing     Problem: SAFETY ADULT  Goal: Patient will remain free of falls  Description: INTERVENTIONS:  - Educate patient/family on patient safety including physical limitations  - Instruct patient to call for assistance with activity   - Consult OT/PT to assist with strengthening/mobility   - Keep Call bell within reach  - Keep bed low and locked with side rails adjusted as appropriate  - Keep care items and personal belongings within reach  - Initiate and maintain comfort rounds  - Make Fall Risk Sign visible to staff  - Offer Toileting  10/14/2023 0245 by John Snow RN  Outcome: Progressing  10/14/2023 0244 by John Snow RN  Outcome: Progressing  Goal: Maintain or return to baseline ADL function  Description: INTERVENTIONS:  -  Assess patient's ability to carry out ADLs; assess patient's baseline for ADL function and identify physical deficits which impact ability to perform ADLs (bathing, care of mouth/teeth, toileting, grooming, dressing, etc.)  - Assess/evaluate cause of self-care deficits   - Assess range of motion  - Assess patient's mobility; develop plan if impaired  - Assess patient's need for assistive devices and provide as appropriate  - Encourage maximum independence but intervene and supervise when necessary  - Involve family in performance of ADLs  - Assess for home care needs following discharge   - Consider OT consult to assist with ADL evaluation and planning for discharge  - Provide patient education as appropriate  10/14/2023 0245 by John Snow RN  Outcome: Progressing  10/14/2023 0244 by John Snow RN  Outcome: Progressing     Problem: Knowledge Deficit  Goal: Patient/family/caregiver demonstrates understanding of disease process, treatment plan, medications, and discharge instructions  Description: Complete learning assessment and assess knowledge base. Interventions:  - Provide teaching at level of understanding  - Provide teaching via preferred learning methods  Outcome: Completed  Goal: Verbalizes understanding of labor plan  Description: Assess patient/family/caregiver's baseline knowledge level and ability to understand information. Provide education via patient/family/caregiver's preferred learning method at appropriate level of understanding.      1. Provide teaching at level of understanding. 2. Provide teaching via preferred learning method(s).   Outcome: Completed     Problem: DISCHARGE PLANNING  Goal: Discharge to home or other facility with appropriate resources  Description: INTERVENTIONS:  - Identify barriers to discharge w/patient and caregiver  - Arrange for needed discharge resources and transportation as appropriate  - Identify discharge learning needs (meds, wound care, etc.)  - Arrange for interpretive services to assist at discharge as needed  - Refer to Case Management Department for coordinating discharge planning if the patient needs post-hospital services based on physician/advanced practitioner order or complex needs related to functional status, cognitive ability, or social support system  10/14/2023 0245 by Farzaneh Patel RN  Outcome: Progressing  10/14/2023 0244 by Farzaneh Patel RN  Outcome: Progressing

## 2023-10-14 NOTE — OB LABOR/OXYTOCIN SAFETY PROGRESS
Oxytocin Safety Progress Check Note - Alvina Love 25 y.o. female MRN: [id number]    Unit/Bed#: -01 Encounter: [id number]    Dose (chinedu-units/min) Oxytocin: 10 chinedu-units/min  Contraction Frequency (minutes): 2-7  Contraction Quality: Strong  Tachysystole: No   Cervical Dilation: 5-6        Cervical Effacement: 80  Fetal Station: -1  Baseline Rate: 125 bpm  Fetal Heart Rate: 128 BPM  FHR Category: cat i               Vital Signs:   Vitals:    10/13/23 2330   BP: 120/72   Pulse: 70   Resp:    Temp:    SpO2:        Notes/comments:   Resting comfortably  SVE 5-6/80/-1  [facility] Antonette Gorman MD 10/13/2023 11:52 PM

## 2023-10-14 NOTE — PLAN OF CARE
Problem: PAIN - ADULT  Goal: Verbalizes/displays adequate comfort level or baseline comfort level  Description: Interventions:  - Encourage patient to monitor pain and request assistance  - Assess pain using appropriate pain scale  - Administer analgesics based on type and severity of pain and evaluate response  - Implement non-pharmacological measures as appropriate and evaluate response  - Consider cultural and social influences on pain and pain management  - Notify physician/advanced practitioner if interventions unsuccessful or patient reports new pain  Outcome: Progressing     Problem: INFECTION - ADULT  Goal: Absence or prevention of progression during hospitalization  Description: INTERVENTIONS:  - Assess and monitor for signs and symptoms of infection  - Monitor lab/diagnostic results  - Monitor all insertion sites, i.e. indwelling lines, tubes, and drains  - Monitor endotracheal if appropriate and nasal secretions for changes in amount and color  - [place] appropriate cooling/warming therapies per order  - Administer medications as ordered  - Instruct and encourage patient and family to use good hand hygiene technique  - Identify and instruct in appropriate isolation precautions for identified infection/condition  Outcome: Progressing  Goal: Absence of fever/infection during neutropenic period  Description: INTERVENTIONS:  - Monitor WBC    Outcome: Progressing     Problem: SAFETY ADULT  Goal: Patient will remain free of falls  Description: INTERVENTIONS:  - Educate patient/family on patient safety including physical limitations  - Instruct patient to call for assistance with activity   - Consult OT/PT to assist with strengthening/mobility   - Keep Call bell within reach  - Keep bed low and locked with side rails adjusted as appropriate  - Keep care items and personal belongings within reach  - Initiate and maintain comfort rounds  - Make Fall Risk Sign visible to staff  - Offer Toileting  Outcome: Progressing  Goal: Maintain or return to baseline ADL function  Description: INTERVENTIONS:  -  Assess patient's ability to carry out ADLs; assess patient's baseline for ADL function and identify physical deficits which impact ability to perform ADLs (bathing, care of mouth/teeth, toileting, grooming, dressing, etc.)  - Assess/evaluate cause of self-care deficits   - Assess range of motion  - Assess patient's mobility; develop plan if impaired  - Assess patient's need for assistive devices and provide as appropriate  - Encourage maximum independence but intervene and supervise when necessary  - Involve family in performance of ADLs  - Assess for home care needs following discharge   - Consider OT consult to assist with ADL evaluation and planning for discharge  - Provide patient education as appropriate  Outcome: Progressing     Problem: DISCHARGE PLANNING  Goal: Discharge to home or other facility with appropriate resources  Description: INTERVENTIONS:  - Identify barriers to discharge w/patient and caregiver  - Arrange for needed discharge resources and transportation as appropriate  - Identify discharge learning needs (meds, wound care, etc.)  - Arrange for interpretive services to assist at discharge as needed  - Refer to Case Management Department for coordinating discharge planning if the patient needs post-hospital services based on physician/advanced practitioner order or complex needs related to functional status, cognitive ability, or social support system  Outcome: Progressing     Problem: POSTPARTUM  Goal: Experiences normal postpartum course  Description: INTERVENTIONS:  - Monitor maternal vital signs  - Assess uterine involution and lochia  Outcome: Progressing  Goal: Appropriate maternal -  bonding  Description: INTERVENTIONS:  - Identify family support  - Assess for appropriate maternal/infant bonding   -Encourage maternal/infant bonding opportunities  - Referral to  or case manager as needed  Outcome: Progressing  Goal: Establishment of infant feeding pattern  Description: INTERVENTIONS:  - Assess breast/bottle feeding  - Refer to lactation as needed  Outcome: Progressing

## 2023-10-14 NOTE — ANESTHESIA PREPROCEDURE EVALUATION
Procedure:  LABOR ANALGESIA    Pts 197    Relevant Problems   No relevant active problems             Anesthesia Plan  ASA Score- 2     Anesthesia Type- epidural with ASA Monitors. Additional Monitors:     Airway Plan:     Comment: Discussed the risks/benefits of neuraxial anesthesia, including risk of bleeding/infection/damage to underlying structures, the likely side effect of nausea, and unlikely complication of spinal headache. .       Plan Factors-Exercise tolerance (METS): >4 METS. Chart reviewed. Existing labs reviewed. Patient summary reviewed. Patient is not a current smoker. Patient instructed to abstain from smoking on day of procedure. Patient did not smoke on day of surgery. Induction-     Postoperative Plan-     Informed Consent- Anesthetic plan and risks discussed with patient. I personally reviewed this patient with the CRNA. Discussed and agreed on the Anesthesia Plan with the CRNA. Lidia Phalen

## 2023-10-14 NOTE — PLAN OF CARE
Problem: PAIN - ADULT  Goal: Verbalizes/displays adequate comfort level or baseline comfort level  Description: Interventions:  - Encourage patient to monitor pain and request assistance  - Assess pain using appropriate pain scale  - Administer analgesics based on type and severity of pain and evaluate response  - Implement non-pharmacological measures as appropriate and evaluate response  - Consider cultural and social influences on pain and pain management  - Notify physician/advanced practitioner if interventions unsuccessful or patient reports new pain  Outcome: Progressing     Problem: INFECTION - ADULT  Goal: Absence or prevention of progression during hospitalization  Description: INTERVENTIONS:  - Assess and monitor for signs and symptoms of infection  - Monitor lab/diagnostic results  - Monitor all insertion sites, i.e. indwelling lines, tubes, and drains  - Monitor endotracheal if appropriate and nasal secretions for changes in amount and color  - Webster appropriate cooling/warming therapies per order  - Administer medications as ordered  - Instruct and encourage patient and family to use good hand hygiene technique  - Identify and instruct in appropriate isolation precautions for identified infection/condition  Outcome: Progressing  Goal: Absence of fever/infection during neutropenic period  Description: INTERVENTIONS:  - Monitor WBC    Outcome: Progressing     Problem: SAFETY ADULT  Goal: Patient will remain free of falls  Description: INTERVENTIONS:  - Educate patient/family on patient safety including physical limitations  - Instruct patient to call for assistance with activity   - Consult OT/PT to assist with strengthening/mobility   - Keep Call bell within reach  - Keep bed low and locked with side rails adjusted as appropriate  - Keep care items and personal belongings within reach  - Initiate and maintain comfort rounds  - Make Fall Risk Sign visible to staff  - Offer Toileting  Outcome: Progressing  Goal: Maintain or return to baseline ADL function  Description: INTERVENTIONS:  -  Assess patient's ability to carry out ADLs; assess patient's baseline for ADL function and identify physical deficits which impact ability to perform ADLs (bathing, care of mouth/teeth, toileting, grooming, dressing, etc.)  - Assess/evaluate cause of self-care deficits   - Assess range of motion  - Assess patient's mobility; develop plan if impaired  - Assess patient's need for assistive devices and provide as appropriate  - Encourage maximum independence but intervene and supervise when necessary  - Involve family in performance of ADLs  - Assess for home care needs following discharge   - Consider OT consult to assist with ADL evaluation and planning for discharge  - Provide patient education as appropriate  Outcome: Progressing     Problem: DISCHARGE PLANNING  Goal: Discharge to home or other facility with appropriate resources  Description: INTERVENTIONS:  - Identify barriers to discharge w/patient and caregiver  - Arrange for needed discharge resources and transportation as appropriate  - Identify discharge learning needs (meds, wound care, etc.)  - Arrange for interpretive services to assist at discharge as needed  - Refer to Case Management Department for coordinating discharge planning if the patient needs post-hospital services based on physician/advanced practitioner order or complex needs related to functional status, cognitive ability, or social support system  Outcome: Progressing     Problem: POSTPARTUM  Goal: Experiences normal postpartum course  Description: INTERVENTIONS:  - Monitor maternal vital signs  - Assess uterine involution and lochia  Outcome: Progressing  Goal: Appropriate maternal -  bonding  Description: INTERVENTIONS:  - Identify family support  - Assess for appropriate maternal/infant bonding   -Encourage maternal/infant bonding opportunities  - Referral to  or case manager as needed  Outcome: Progressing  Goal: Establishment of infant feeding pattern  Description: INTERVENTIONS:  - Assess breast/bottle feeding  - Refer to lactation as needed  Outcome: Progressing

## 2023-10-14 NOTE — ANESTHESIA PROCEDURE NOTES
Epidural Block    Patient location during procedure: OB/L&D  Start time: 10/13/2023 9:56 PM  Reason for block: procedure for pain  Staffing  Performed by: Lauran Dakins, MD  Authorized by: Lauran Dakins, MD    Preanesthetic Checklist  Completed: patient identified, IV checked, site marked, risks and benefits discussed, surgical consent, monitors and equipment checked, pre-op evaluation and timeout performed  Epidural  Patient position: sitting  Prep: ChloraPrep  Sedation Level: no sedation  Patient monitoring: cardiac monitor, continuous pulse oximetry, frequent blood pressure checks and heart rate  Approach: midline  Location: lumbar, L3-4  Injection technique: CECE saline and CECE air  Needle  Needle type: Tuohy   Needle gauge: 17 G  Needle insertion depth: 5 cm  Catheter size: 19 G  Catheter at skin depth: 11 cm  Catheter securement method: clear occlusive dressing  Test dose: negativelidocaine-epinephrine (XYLOCAINE-MPF/EPINEPHRINE) 1.5 %-1:200,000 injection 3 mL - Epidural   3 mL - 10/13/2023 9:57:00 PM  Assessment  Sensory level: T4  Number of attempts: 1negative aspiration for CSF, negative aspiration for heme and no paresthesia on injection  patient tolerated the procedure well with no immediate complications  Additional Notes  Unremarkable epidural

## 2023-10-14 NOTE — ANESTHESIA POSTPROCEDURE EVALUATION
Post-Op Assessment Note    CV Status:  Stable    Pain management: adequate     Mental Status:  Alert and awake   Hydration Status:  Euvolemic   PONV Controlled:  Controlled   Airway Patency:  Patent      Post Op Vitals Reviewed: Yes      Staff: CRNA     Post-op block assessment: no complications, site cleaned and catheter intact      No notable events documented.     BP   128/81   Temp      Pulse  85   Resp      SpO2

## 2023-10-14 NOTE — RESPIRATORY THERAPY NOTE
ABG drawn on Room Air. Edinson's test preformed. ABG drawn from LRA. Patient tolerated well. Specimen labeled and sent sent to lab for processing.
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
## 2023-10-14 NOTE — DISCHARGE SUMMARY
Obstetrics Discharge Summary  Miriam Martin 25 y.o. female MRN: [id number]  Unit/Bed#: -01 Encounter: [id number]    Admission Date: 10/13/2023     Discharge Date: 10/16/23    Admitting and Delivery Attending: Dr. Ame Garner  Discharging Attending: Levi Walker    Admitting Diagnoses:   Pregnancy at 37w6d  Fetal growth restriction  Insufficient prenatal care  [facility] use in pregnancy  Anxiety/depression  6. Housing instability    Discharge Diagnoses:    at 38w0d    Procedures: spontaneous vaginal delivery    Anesthesia: epidural    Hospital course: Miriam Martin is now a 25 y.o. [id number] who was initially admitted at 37w6d for induction of labor due to fetal growth restriction. She was induced with Pitocin and augmented with AROM. She received an epidural and progressed to complete cervical dilation and began pushing. On 10/14/2023 she delivered a viable female  38w0d via normal spontaneous vaginal delivery over an intact perineum. 's birth weight was 5lbs 11.7oz; Apgars were 9 (1 min) and 9 (5 min).  was admitted to the  nursery. Patient tolerated the procedure well. Her post-delivery course was complicated by uterine atony without hemorrhage with a calculated blood loss of 256cc. Her postpartum pain was well controlled with oral analgesics. Maternal blood type is O positive so RhoGAM was not indicated. She was seen by case management inpatient and declined unstable housing and was cleared for discharge and C&Y will be following up outpatient. On day of discharge, she was ambulating and able to reasonably perform all ADLs. She was voiding and had appropriate bowel function. Pain was well controlled. She was discharged home on postpartum day #2 without complications. Patient was instructed to follow up with her OBGYN as an outpatient and was given appropriate warnings to call provider if she develops signs of infection or uncontrolled pain.     Complications: none apparent    Condition at discharge: good     Provisions for Follow-Up Care:  Please see after visit summary for information related to follow-up care and any pertinent home health orders. Disposition: Home    Planned Readmission: No    Discharge Medications:   Please see AVS for a complete list of discharge medications. Discharge instructions :   Please see AVS for complete discharge instructions.     Slick Kolb MD  OBGYN, PGY-1  10/16/23  5:59 AM

## 2023-10-14 NOTE — L&D DELIVERY NOTE
DELIVERY NOTE  Yun Osorio 25 y.o. female MRN: [id number]  Unit/Bed#: -01 Encounter: [id number]    Obstetrician:    Dr. Luis Miguel Bloom MD    Assistant:   Dr. Hao Dennis MD    Pre-Delivery Diagnosis:   Patient Active Problem List   Diagnosis    Previous  delivery, antepartum    Anxiety and depression    Carrier of beta thalassemia    Encounter for induction of labor    Postpartum depression    Poor fetal growth affecting management of mother in third trimester     (spontaneous vaginal delivery)    History of inadequate prenatal care     Post-Delivery Diagnosis:   Same as above - Delivered    Procedure:  Spontaneous vaginal delivery    Anesthesia:  epidural    Specimens:   Cord blood obtained   Placenta; normal appearing, central insertion, intact   Arterial and venous blood gases (below)     Gases:  Umbilical Cord Venous Blood Gas:  Results from last 7 days   Lab Units 10/14/23  014   PH COV  7.394   PCO2 COV mm HG 34.9   HCO3 COV mmol/L 20.8   BASE EXC COV mmol/L -3.2*   O2 CT CD VB mL/dL 18.6   O2 HGB, VENOUS CORD % 46.3     Umbilical Cord Arterial Blood Gas:  Results from last 7 days   Lab Units 10/14/23  0148   PH COA  7.317   PCO2 COA  50.8   PO2 COA mm HG 16.4   HCO3 COA mmol/L 25.4   BASE EXC COA mmol/L -1.5*   O2 CONTENT CORD ART ml/dl 8.4   O2 HGB, ARTERIAL CORD % 36.4       Quantitative Blood Loss:   70 mL           Complications:    None    Brief Description of Labor Course: Yun Osorio is now a 25 y.o. [id number] who was initially admitted at 37w6d for induction of labor due to fetal growth restriction. She was induced with Pitocin and augmented with AROM. She received an epidural and progressed to complete cervical dilation and began pushing. Description of Delivery:   With  the assistance of maternal expulsive forces, the fetal vertex delivered spontaneously. The anterior right shoulder was delivered atraumatically with gentle downward traction.  The contralateral arm was delivered with gentle upward traction and maternal expulsive forces. The remainder of the fetus delivered spontaneously at 36, resulting in a viable . Upon delivery, the infant was placed on the mothers abdomen and the cord was doubly clamped and cut after 30 seconds. The  was noted to have good tone and cry spontaneously. There was no evidence of injury. The  was passed off to  staff for evaluation. Umbilical cord blood and umbilical artery and venous gases were collected and sent to the lab. An intact placenta was delivered spontaneously using fundal massage and gentle cord traction and was noted to have a centrally-inserted 3-vessel cord. Active management of the third stage of labor was undertaken with IV pitocin at 250milliunits/min. Inspection of the perineum, vagina, labia, cervix, and urethra revealed no lacerations. Bleeding was noted to be under control. A bimanual exam was performed and fundus was firm.  Outcome:  Living  with APGARS 9 (1 min) and 9 (5 min).  weight: pending    At the conclusion of the delivery, all needle, sponge, and instrument counts were noted to be correct. Patient tolerated the procedure well and was allowed to recover in labor and delivery room with family and  before being transferred to the post-partum floor. Conclusion:  Mother and baby are currently recovering nicely in stable condition. Attending Supervision:   Dr. Rowdy Sherman MD was present for the entire procedure.     Brandi Cai MD   OB/GYN PGY-2  10/14/2023 3:55 AM

## 2023-10-14 NOTE — RAPID RESPONSE
Rapid Response Note  Darren Grimaldo 25 y.o. female MRN: [id number]  Unit/Bed#: -01 Encounter: [id number]    Rapid Response Notification(s):   Response called date/time:  10/14/2023 11:50 AM  Response team arrival date/time:  10/14/2023 11:52 AM  Response end date/time:  10/14/2023 12:00 PM  Level of care:  [facility]  Rapid response location:  OB/L&D  Primary reason for rapid response call: Other (comment) and acute significant bleed    Rapid Response Intervention(s):   Airway:  None  Breathing:  None  Circulation:  None  Fluids administered:  Normal saline  Medications administered:  None       Assessment:   Syncope- presumed vasovagal syncope    Plan:   Assessed w/ OB resident at bedside- patient passed clots and experienced pain while ambulating and then has syncopal episode. Uterine tone appropriate and no further hemorrhage noted on exam.  Patient remained hemodynamically stable and not tachycardic throughout recovery from event and returned to baseline mental status after a short while. iSTAT revealed stable Hb and given stable VS and hb levels patient was determined to be stable on L/D floor and not require further imaging. Check orthostatics and continue oral hydration. Rapid Response Outcome:   Transfer:  Remain on floor  Code Status: Level 1 (Full Code)      Family notified of transfer: N/A  Family member contacted:  updated during rapid response. Background/Situation:   Darren Grimaldo is a 25 y.o. female who is admitted after recent labor and developed lightheadedness and syncope while standing after passing large blood clots. This prompted rapid response/code crimson by family and nursing staff. Review of Systems   Constitutional:  Negative for chills and fever. Respiratory:  Negative for chest tightness and shortness of breath. Cardiovascular:  Negative for chest pain. Genitourinary:  Positive for pelvic pain and vaginal bleeding.    Neurological:  Positive for syncope, weakness and light-headedness. Objective:   Vitals:    10/14/23 0230 10/14/23 0245 10/14/23 0325 10/14/23 0901   BP: 113/70 102/72 128/81 113/57   BP Location:    Right arm   Pulse: 73 78 85 65   Resp:    19   Temp:    97.8 °F (36.6 °C)   TempSrc:    Oral   SpO2:    97%   Weight:       Height:         Physical Exam  Constitutional:       General: She is in acute distress. Appearance: She is ill-appearing. Cardiovascular:      Rate and Rhythm: Normal rate and regular rhythm. Pulmonary:      Effort: Pulmonary effort is normal.      Breath sounds: Normal breath sounds. Abdominal:      Comments: Gravid abdomen w/ firm uterus appropriately below per OB resident. Neurological:      Mental Status: She is lethargic. Portions of the record may have been created with voice recognition software. Occasional wrong word or "sound a like" substitutions may have occurred due to the inherent limitations of voice recognition software. Read the chart carefully and recognize, using context, where substitutions have occurred.     Anahi Wyman PA-C

## 2023-10-14 NOTE — OB LABOR/OXYTOCIN SAFETY PROGRESS
Oxytocin Safety Progress Check Note - Nagi Lomax 25 y.o. female MRN: [id number]    Unit/Bed#: -01 Encounter: [id number]    Dose (chinedu-units/min) Oxytocin: 6 chinedu-units/min  Contraction Frequency (minutes): 4  Contraction Quality: Strong  Tachysystole: No   Cervical Dilation: 8  Cervical Effacement: 80  Fetal Station: 0  Baseline Rate: 125 bpm  Fetal Heart Rate: 119 BPM  FHR Category: I      Vital Signs:   Vitals:    10/14/23 0015   BP: 117/71   Pulse: 83   Resp:    Temp:    SpO2:        Notes/comments:   Patient requested cervical examination. She is now 8/80/0 with constant rectal pressure. Encouraged to press epidural button since she is not yet fully dilated. Fetal heart tracing category 1 with early decelerations. Continue Pitocin. Plan of care to be discussed with Dr. Kirk Arana.     Puja Amin MD 10/14/2023 12:36 AM

## 2023-10-14 NOTE — PLAN OF CARE
Problem: BIRTH - VAGINAL/ SECTION  Goal: Fetal and maternal status remain reassuring during the birth process  Description: INTERVENTIONS:  - Monitor vital signs  - Monitor fetal heart rate  - Monitor uterine activity  - Monitor labor progression (vaginal delivery)  - DVT prophylaxis  - Antibiotic prophylaxis  Outcome: Progressing  Goal: Emotionally satisfying birthing experience for mother/fetus  Description: Interventions:  - Assess, plan, implement and evaluate the nursing care given to the patient in labor  - Advocate the philosophy that each childbirth experience is a unique experience and support the family's chosen level of involvement and control during the labor process   - Actively participate in both the patient's and family's teaching of the birth process  - Consider cultural, Rastafarian and age-specific factors and plan care for the patient in labor  Outcome: Progressing     Problem: PAIN - ADULT  Goal: Verbalizes/displays adequate comfort level or baseline comfort level  Description: Interventions:  - Encourage patient to monitor pain and request assistance  - Assess pain using appropriate pain scale  - Administer analgesics based on type and severity of pain and evaluate response  - Implement non-pharmacological measures as appropriate and evaluate response  - Consider cultural and social influences on pain and pain management  - Notify physician/advanced practitioner if interventions unsuccessful or patient reports new pain  Outcome: Progressing     Problem: INFECTION - ADULT  Goal: Absence or prevention of progression during hospitalization  Description: INTERVENTIONS:  - Assess and monitor for signs and symptoms of infection  - Monitor lab/diagnostic results  - Monitor all insertion sites, i.e. indwelling lines, tubes, and drains  - Monitor endotracheal if appropriate and nasal secretions for changes in amount and color  - [place] appropriate cooling/warming therapies per order  - Administer medications as ordered  - Instruct and encourage patient and family to use good hand hygiene technique  - Identify and instruct in appropriate isolation precautions for identified infection/condition  Outcome: Progressing  Goal: Absence of fever/infection during neutropenic period  Description: INTERVENTIONS:  - Monitor WBC    Outcome: Progressing     Problem: SAFETY ADULT  Goal: Patient will remain free of falls  Description: INTERVENTIONS:  - Educate patient/family on patient safety including physical limitations  - Instruct patient to call for assistance with activity   - Consult OT/PT to assist with strengthening/mobility   - Keep Call bell within reach  - Keep bed low and locked with side rails adjusted as appropriate  - Keep care items and personal belongings within reach  - Initiate and maintain comfort rounds  Outcome: Progressing  Goal: Maintain or return to baseline ADL function  Description: INTERVENTIONS:  -  Assess patient's ability to carry out ADLs; assess patient's baseline for ADL function and identify physical deficits which impact ability to perform ADLs (bathing, care of mouth/teeth, toileting, grooming, dressing, etc.)  - Assess/evaluate cause of self-care deficits   - Assess range of motion  - Assess patient's mobility; develop plan if impaired  - Assess patient's need for assistive devices and provide as appropriate  - Encourage maximum independence but intervene and supervise when necessary  - Involve family in performance of ADLs  - Assess for home care needs following discharge   - Consider OT consult to assist with ADL evaluation and planning for discharge  - Provide patient education as appropriate  Outcome: Progressing  Goal: Maintains/Returns to pre admission functional level  Description: INTERVENTIONS:  - Perform BMAT or MOVE assessment daily.   - Set and communicate daily mobility goal to care team and patient/family/caregiver.    - Collaborate with rehabilitation services on mobility goals if consulted  - Out of bed for toileting  - Record patient progress and toleration of activity level   Outcome: Progressing     Problem: Knowledge Deficit  Goal: Patient/family/caregiver demonstrates understanding of disease process, treatment plan, medications, and discharge instructions  Description: Complete learning assessment and assess knowledge base. Interventions:  - Provide teaching at level of understanding  - Provide teaching via preferred learning methods  Outcome: Progressing  Goal: Verbalizes understanding of labor plan  Description: Assess patient/family/caregiver's baseline knowledge level and ability to understand information. Provide education via patient/family/caregiver's preferred learning method at appropriate level of understanding. 1. Provide teaching at level of understanding. 2. Provide teaching via preferred learning method(s). Outcome: Progressing     Problem: DISCHARGE PLANNING  Goal: Discharge to home or other facility with appropriate resources  Description: INTERVENTIONS:  - Identify barriers to discharge w/patient and caregiver  - Arrange for needed discharge resources and transportation as appropriate  - Identify discharge learning needs (meds, wound care, etc.)  - Arrange for interpretive services to assist at discharge as needed  - Refer to Case Management Department for coordinating discharge planning if the patient needs post-hospital services based on physician/advanced practitioner order or complex needs related to functional status, cognitive ability, or social support system  Outcome: Progressing     Problem: Labor & Delivery  Goal: Manages discomfort  Description: Assess and monitor for signs and symptoms of discomfort. Assess patient's pain level regularly and per hospital policy. Administer medications as ordered.  Support use of nonpharmacological methods to help control pain such as distraction, imagery, relaxation, and application of heat and cold.  Collaborate with interdisciplinary team and patient to determine appropriate pain management plan. 1. Include patient in decisions related to comfort. 2. Offer non-pharmacological pain management interventions. 3. Report ineffective pain management to physician. Outcome: Progressing  Goal: Patient vital signs are stable  Description: 1. Assess vital signs - vaginal delivery.   Outcome: Progressing

## 2023-10-14 NOTE — OB LABOR/OXYTOCIN SAFETY PROGRESS
Oxytocin Safety Progress Check Note - Khushi Moe 25 y.o. female MRN: [id number]    Unit/Bed#: -01 Encounter: [id number]    Dose (chinedu-units/min) Oxytocin: 6 chinedu-units/min  Contraction Frequency (minutes): 1.5-4.5  Contraction Quality: Moderate  Tachysystole: No   Cervical Dilation: 5        Cervical Effacement: 80  Fetal Station: -1  Baseline Rate: 130 bpm  Fetal Heart Rate: 130 BPM  FHR Category: i               Vital Signs:   Vitals:    10/13/23 2252   BP: 118/65   Pulse: 78   Resp:    Temp:    SpO2:        Notes/comments:   Comfortable with PCEA  SVE unchanged  AROM at this check    Pitocin decreased from 8 to 6  Cat I tracing    Alana Boxer, MD 10/13/2023 11:00 PM

## 2023-10-14 NOTE — PROGRESS NOTES
Vitals:    10/14/23 0901   BP: 113/57   Pulse: 65   Resp: 19   Temp: 97.8 °F (36.6 °C)   SpO2: 97%     Provider called to bedside during "Code Crimson"  On initial entry, mild amount of clot and vaginal bleeding on bed linen and chucks  Patient initially unresponsive during communication  Critical care present at bedside for continued assessment  Fundal uterine tone noted to be firm at 1cm below umbilicus  After completion of rapid response, continued moderate vaginal bleeding appreciated with passage of clot  200mcg IM methergine and 1g IV TXA administered  1mg IV dilaudid administered for bimanual exam which was significant for abundant clot in the lower uterine segment  MARLON tone noted to be firm after removal of clot and administration of utertonic agent  2g IV cefoxitin ordered for antibiotic prophylaxis s/p bimanual exam  Cumulative blood loss 256cc      Lucas Hernandez MD  OB/GYN PGY-3

## 2023-10-15 LAB
ATRIAL RATE: 68 BPM
P AXIS: 61 DEGREES
PR INTERVAL: 168 MS
QRS AXIS: 55 DEGREES
QRSD INTERVAL: 92 MS
QT INTERVAL: 394 MS
QTC INTERVAL: 418 MS
T WAVE AXIS: 52 DEGREES
VENTRICULAR RATE: 68 BPM

## 2023-10-15 PROCEDURE — 99024 POSTOP FOLLOW-UP VISIT: CPT | Performed by: OBSTETRICS & GYNECOLOGY

## 2023-10-15 PROCEDURE — 93010 ELECTROCARDIOGRAM REPORT: CPT | Performed by: INTERNAL MEDICINE

## 2023-10-15 RX ADMIN — ACETAMINOPHEN 650 MG: 325 TABLET, FILM COATED ORAL at 08:40

## 2023-10-15 RX ADMIN — DOCUSATE SODIUM 100 MG: 100 CAPSULE, LIQUID FILLED ORAL at 18:11

## 2023-10-15 RX ADMIN — IBUPROFEN 600 MG: 600 TABLET, FILM COATED ORAL at 15:32

## 2023-10-15 RX ADMIN — IBUPROFEN 600 MG: 600 TABLET, FILM COATED ORAL at 05:12

## 2023-10-15 NOTE — PROGRESS NOTES
RN at bedside after hearing code crimson on overhead intercom. Large clot and mild bleeding on floor at bedside. Patient initially unresponsive with communication. 1000 mL bolus of LR was given via vernal order from MD. Second IV was started by another RN. PT became responsive to the pain of the IV insertion. Patient was oriented to place, time, and self. Patient was scared and crying. RN comforted Pt with a hug. FOB at bedside, also comforting PT. EKG performed. Methergine and TXA was given to help with moderate bleeding. Dilaudid was given for bi-manual. Bi-manual was completed by MD with the removal small clots.  RN gave cefoxitin post bi-manual exam.

## 2023-10-15 NOTE — PROGRESS NOTES
Progress Note - OB/GYN  Jer Alarcon 25 y.o. female MRN: [id number]  Unit/Bed#: -01 Encounter: [id number]    Assessment and Plan     Jer Alarcon is a patient of: [facility] . She is PPD# 1 s/p  spontaneous vaginal delivery  Recovering well and is stable        (spontaneous vaginal delivery)  Assessment & Plan  Lochia WNL   Recovering well   Appropriate bowel and bladder function   Pain well controlled   Tolerating diet   Breastfeeding: no   Ambulating without issues   No lower extremity tenderness  GBS pending   Rh pos  Contraception: declined       History of inadequate prenatal care  Assessment & Plan  -Absence of presentation to appointments between 23 to 10/12/23  -In setting of homelessness  -Requires postpartum case management consult    * Anxiety and depression  Assessment & Plan  Discuss antidepressants postpartum        Disposition    - Anticipate discharge home on PPD# 1-2      Subjective/Objective     Chief Complaint: Postpartum State     Subjective: Jer Alarcon is PPD/POD#1 s/p  spontaneous vaginal delivery. She has no current complaints. Pain is well controlled. Patient is currently voiding. She is ambulating. Patient is currently passing flatus and has had no bowel movement. She is tolerating PO, and denies nausea or vomitting. Patient denies fever, chills, chest pain, shortness of breath, or calf tenderness. Lochia is minimal. She is  Bottle feeding. She is recovering well and is stable.        Vitals:   /62 (BP Location: Right arm)   Pulse 72   Temp 98.2 °F (36.8 °C) (Oral)   Resp 18   Ht 5' 5.98" (1.676 m)   Wt 71.2 kg (157 lb)   LMP 2023 (Exact Date)   SpO2 100%   Breastfeeding No   BMI 25.35 kg/m²       Intake/Output Summary (Last 24 hours) at 10/15/2023 0552  Last data filed at 10/14/2023 1234  Gross per 24 hour   Intake --   Output 686 ml   Net -686 ml       Invasive Devices       Peripheral Intravenous Line  Duration Peripheral IV 10/13/23 Dorsal (posterior); Right Hand 1 day    Peripheral IV 10/14/23 Left;Ventral (anterior) Forearm <1 day                    Physical Exam:   GEN: Merissa [place] appears well, alert and oriented x 3, pleasant and cooperative   CARDIO: RRR, no murmurs or rubs  RESP:  CTAB, no wheezes or rales  ABDOMEN: soft, no tenderness, no distention, fundus @ U-2  EXTREMITIES: SCDs on, non tender, no erythema      Labs:     Hemoglobin   Date Value Ref Range Status   10/14/2023 11.1 (L) 11.5 - 15.4 g/dL Final   10/12/2023 11.9 11.5 - 15.4 g/dL Final     Hgb, i-STAT   Date Value Ref Range Status   10/14/2023 10.5 (L) 11.5 - 15.4 g/dl Final     WBC   Date Value Ref Range Status   10/14/2023 9.83 4.31 - 10.16 Thousand/uL Final   10/12/2023 7.42 4.31 - 10.16 Thousand/uL Final     Platelets   Date Value Ref Range Status   10/14/2023 186 149 - 390 Thousands/uL Final   10/12/2023 197 149 - 390 Thousands/uL Final     Creatinine   Date Value Ref Range Status   04/15/2023 0.56 (L) 0.60 - 1.30 mg/dL Final     Comment:     Standardized to IDMS reference method   06/25/2021 0.75 0.60 - 1.30 mg/dL Final     Comment:     Standardized to IDMS reference method     AST   Date Value Ref Range Status   04/15/2023 33 13 - 39 U/L Final     Comment:     Moderately Hemolyzed:Results may be affected. 01/27/2020 22 5 - 45 U/L Final     Comment:       Specimen collection should occur prior to Sulfasalazine administration due to the potential for falsely depressed results. ALT   Date Value Ref Range Status   04/15/2023 14 7 - 52 U/L Final     Comment:     Specimen collection should occur prior to Sulfasalazine administration due to the potential for falsely depressed results. 01/27/2020 26 12 - 78 U/L Final     Comment:       Specimen collection should occur prior to Sulfasalazine administration due to the potential for falsely depressed results.            Elizabeth Hernandez MD  10/15/2023  5:52 AM

## 2023-10-15 NOTE — PLAN OF CARE
Problem: PAIN - ADULT  Goal: Verbalizes/displays adequate comfort level or baseline comfort level  Description: Interventions:  - Encourage patient to monitor pain and request assistance  - Assess pain using appropriate pain scale  - Administer analgesics based on type and severity of pain and evaluate response  - Implement non-pharmacological measures as appropriate and evaluate response  - Consider cultural and social influences on pain and pain management  - Notify physician/advanced practitioner if interventions unsuccessful or patient reports new pain  Outcome: Progressing     Problem: INFECTION - ADULT  Goal: Absence or prevention of progression during hospitalization  Description: INTERVENTIONS:  - Assess and monitor for signs and symptoms of infection  - Monitor lab/diagnostic results  - Monitor all insertion sites, i.e. indwelling lines, tubes, and drains  - Monitor endotracheal if appropriate and nasal secretions for changes in amount and color  - [place] appropriate cooling/warming therapies per order  - Administer medications as ordered  - Instruct and encourage patient and family to use good hand hygiene technique  - Identify and instruct in appropriate isolation precautions for identified infection/condition  Outcome: Progressing  Goal: Absence of fever/infection during neutropenic period  Description: INTERVENTIONS:  - Monitor WBC    Outcome: Progressing     Problem: SAFETY ADULT  Goal: Patient will remain free of falls  Description: INTERVENTIONS:  - Educate patient/family on patient safety including physical limitations  - Instruct patient to call for assistance with activity   - Consult OT/PT to assist with strengthening/mobility   - Keep Call bell within reach  - Keep bed low and locked with side rails adjusted as appropriate  - Keep care items and personal belongings within reach  - Initiate and maintain comfort rounds  - Make Fall Risk Sign visible to staff  - Offer Toileting  Outcome: Progressing  Goal: Maintain or return to baseline ADL function  Description: INTERVENTIONS:  -  Assess patient's ability to carry out ADLs; assess patient's baseline for ADL function and identify physical deficits which impact ability to perform ADLs (bathing, care of mouth/teeth, toileting, grooming, dressing, etc.)  - Assess/evaluate cause of self-care deficits   - Assess range of motion  - Assess patient's mobility; develop plan if impaired  - Assess patient's need for assistive devices and provide as appropriate  - Encourage maximum independence but intervene and supervise when necessary  - Involve family in performance of ADLs  - Assess for home care needs following discharge   - Consider OT consult to assist with ADL evaluation and planning for discharge  - Provide patient education as appropriate  Outcome: Progressing     Problem: DISCHARGE PLANNING  Goal: Discharge to home or other facility with appropriate resources  Description: INTERVENTIONS:  - Identify barriers to discharge w/patient and caregiver  - Arrange for needed discharge resources and transportation as appropriate  - Identify discharge learning needs (meds, wound care, etc.)  - Arrange for interpretive services to assist at discharge as needed  - Refer to Case Management Department for coordinating discharge planning if the patient needs post-hospital services based on physician/advanced practitioner order or complex needs related to functional status, cognitive ability, or social support system  Outcome: Progressing     Problem: POSTPARTUM  Goal: Experiences normal postpartum course  Description: INTERVENTIONS:  - Monitor maternal vital signs  - Assess uterine involution and lochia  Outcome: Progressing  Goal: Appropriate maternal -  bonding  Description: INTERVENTIONS:  - Identify family support  - Assess for appropriate maternal/infant bonding   -Encourage maternal/infant bonding opportunities  - Referral to  or case manager as needed  Outcome: Progressing  Goal: Establishment of infant feeding pattern  Description: INTERVENTIONS:  - Assess breast/bottle feeding  - Refer to lactation as needed  Outcome: Progressing

## 2023-10-15 NOTE — PLAN OF CARE
Problem: PAIN - ADULT  Goal: Verbalizes/displays adequate comfort level or baseline comfort level  Description: Interventions:  - Encourage patient to monitor pain and request assistance  - Assess pain using appropriate pain scale  - Administer analgesics based on type and severity of pain and evaluate response  - Implement non-pharmacological measures as appropriate and evaluate response  - Consider cultural and social influences on pain and pain management  - Notify physician/advanced practitioner if interventions unsuccessful or patient reports new pain  Outcome: Progressing     Problem: INFECTION - ADULT  Goal: Absence or prevention of progression during hospitalization  Description: INTERVENTIONS:  - Assess and monitor for signs and symptoms of infection  - Monitor lab/diagnostic results  - Monitor all insertion sites, i.e. indwelling lines, tubes, and drains  - Monitor endotracheal if appropriate and nasal secretions for changes in amount and color  - [place] appropriate cooling/warming therapies per order  - Administer medications as ordered  - Instruct and encourage patient and family to use good hand hygiene technique  - Identify and instruct in appropriate isolation precautions for identified infection/condition  Outcome: Progressing  Goal: Absence of fever/infection during neutropenic period  Description: INTERVENTIONS:  - Monitor WBC    Outcome: Progressing     Problem: SAFETY ADULT  Goal: Patient will remain free of falls  Description: INTERVENTIONS:  - Educate patient/family on patient safety including physical limitations  - Instruct patient to call for assistance with activity   - Consult OT/PT to assist with strengthening/mobility   - Keep Call bell within reach  - Keep bed low and locked with side rails adjusted as appropriate  - Keep care items and personal belongings within reach  - Initiate and maintain comfort rounds  - Make Fall Risk Sign visible to staff  - Offer Toileting  Outcome: Progressing  Goal: Maintain or return to baseline ADL function  Description: INTERVENTIONS:  -  Assess patient's ability to carry out ADLs; assess patient's baseline for ADL function and identify physical deficits which impact ability to perform ADLs (bathing, care of mouth/teeth, toileting, grooming, dressing, etc.)  - Assess/evaluate cause of self-care deficits   - Assess range of motion  - Assess patient's mobility; develop plan if impaired  - Assess patient's need for assistive devices and provide as appropriate  - Encourage maximum independence but intervene and supervise when necessary  - Involve family in performance of ADLs  - Assess for home care needs following discharge   - Consider OT consult to assist with ADL evaluation and planning for discharge  - Provide patient education as appropriate  Outcome: Progressing     Problem: DISCHARGE PLANNING  Goal: Discharge to home or other facility with appropriate resources  Description: INTERVENTIONS:  - Identify barriers to discharge w/patient and caregiver  - Arrange for needed discharge resources and transportation as appropriate  - Identify discharge learning needs (meds, wound care, etc.)  - Arrange for interpretive services to assist at discharge as needed  - Refer to Case Management Department for coordinating discharge planning if the patient needs post-hospital services based on physician/advanced practitioner order or complex needs related to functional status, cognitive ability, or social support system  Outcome: Progressing     Problem: POSTPARTUM  Goal: Experiences normal postpartum course  Description: INTERVENTIONS:  - Monitor maternal vital signs  - Assess uterine involution and lochia  Outcome: Progressing  Goal: Appropriate maternal -  bonding  Description: INTERVENTIONS:  - Identify family support  - Assess for appropriate maternal/infant bonding   -Encourage maternal/infant bonding opportunities  - Referral to  or case manager as needed  Outcome: Progressing  Goal: Establishment of infant feeding pattern  Description: INTERVENTIONS:  - Assess breast/bottle feeding  - Refer to lactation as needed  Outcome: Progressing

## 2023-10-15 NOTE — CASE MANAGEMENT
Case Management Progress Note    Patient name Jer Alarcon  Location /-79 MRN [id number]  : 1995 Date 10/15/2023       LOS (days): 2  Geometric Mean LOS (GMLOS) (days):   Days to GMLOS:        OBJECTIVE:        Current admission status: Inpatient  Preferred Pharmacy:   [address] [place] [address] [address] [facility], [address] [facility] [address] [facility] [address]  Phone: [phone] Fax: [phone]    [address] [place], [place] - [address]  [address]  [address] [facility] [address]  Phone: [phone] Fax: [phone]    Primary Care Provider: Selvin Mendez DO    Primary Insurance: [address] [place] [address]  Secondary Insurance:     PROGRESS NOTE:      CM responded to consult for "Housing instability" -- CM already spoke with MOB and FOB yesterday to address this concern, however they declined unstable housing or issues with homelessness. CM met with MOB and FOB at bedside to check in again this morning and re-confirm housing situation -- MOB and FOB remain very dismissive, not open to chatting with CM at all. MOB states "Can you stop talking to me about that, please?" CM will close out consult. C&Y continues to follow -- Will follow up to obtain clearance at time of discharge.

## 2023-10-16 VITALS
BODY MASS INDEX: 25.23 KG/M2 | RESPIRATION RATE: 20 BRPM | DIASTOLIC BLOOD PRESSURE: 66 MMHG | HEIGHT: 66 IN | OXYGEN SATURATION: 100 % | SYSTOLIC BLOOD PRESSURE: 122 MMHG | TEMPERATURE: 98.4 F | WEIGHT: 157 LBS | HEART RATE: 75 BPM

## 2023-10-16 LAB — GP B STREP DNA SPEC QL NAA+PROBE: NEGATIVE

## 2023-10-16 PROCEDURE — 99024 POSTOP FOLLOW-UP VISIT: CPT | Performed by: STUDENT IN AN ORGANIZED HEALTH CARE EDUCATION/TRAINING PROGRAM

## 2023-10-16 PROCEDURE — NC001 PR NO CHARGE: Performed by: STUDENT IN AN ORGANIZED HEALTH CARE EDUCATION/TRAINING PROGRAM

## 2023-10-16 RX ORDER — ACETAMINOPHEN 325 MG/1
650 TABLET ORAL EVERY 4 HOURS PRN
Qty: 30 TABLET | Refills: 0 | Status: SHIPPED | OUTPATIENT
Start: 2023-10-16

## 2023-10-16 RX ORDER — IBUPROFEN 600 MG/1
600 TABLET ORAL EVERY 6 HOURS PRN
Qty: 30 TABLET | Refills: 0 | Status: SHIPPED | OUTPATIENT
Start: 2023-10-16

## 2023-10-16 RX ORDER — IBUPROFEN 600 MG/1
600 TABLET ORAL EVERY 6 HOURS PRN
Qty: 30 TABLET | Refills: 0
Start: 2023-10-16

## 2023-10-16 RX ORDER — DOCUSATE SODIUM 100 MG/1
100 CAPSULE, LIQUID FILLED ORAL 2 TIMES DAILY
Refills: 0
Start: 2023-10-16

## 2023-10-16 RX ADMIN — IBUPROFEN 600 MG: 600 TABLET, FILM COATED ORAL at 08:40

## 2023-10-16 NOTE — PLAN OF CARE
Problem: POSTPARTUM  Goal: Experiences normal postpartum course  Description: INTERVENTIONS:  - Monitor maternal vital signs  - Assess uterine involution and lochia  Outcome: Progressing  Goal: Appropriate maternal -  bonding  Description: INTERVENTIONS:  - Identify family support  - Assess for appropriate maternal/infant bonding   -Encourage maternal/infant bonding opportunities  - Referral to  or  as needed  Outcome: Progressing  Goal: Establishment of infant feeding pattern  Description: INTERVENTIONS:  - Assess breast/bottle feeding  - Refer to lactation as needed  Outcome: Progressing     Problem: PAIN - ADULT  Goal: Verbalizes/displays adequate comfort level or baseline comfort level  Description: Interventions:  - Encourage patient to monitor pain and request assistance  - Assess pain using appropriate pain scale  - Administer analgesics based on type and severity of pain and evaluate response  - Implement non-pharmacological measures as appropriate and evaluate response  - Consider cultural and social influences on pain and pain management  - Notify physician/advanced practitioner if interventions unsuccessful or patient reports new pain  Outcome: Progressing     Problem: INFECTION - ADULT  Goal: Absence or prevention of progression during hospitalization  Description: INTERVENTIONS:  - Assess and monitor for signs and symptoms of infection  - Monitor lab/diagnostic results  - Monitor all insertion sites, i.e. indwelling lines, tubes, and drains  - Monitor endotracheal if appropriate and nasal secretions for changes in amount and color  - [place] appropriate cooling/warming therapies per order  - Administer medications as ordered  - Instruct and encourage patient and family to use good hand hygiene technique  - Identify and instruct in appropriate isolation precautions for identified infection/condition  Outcome: Progressing  Goal: Absence of fever/infection during neutropenic period  Description: INTERVENTIONS:  - Monitor WBC    Outcome: Progressing     Problem: SAFETY ADULT  Goal: Patient will remain free of falls  Description: INTERVENTIONS:  - Educate patient/family on patient safety including physical limitations  - Instruct patient to call for assistance with activity   - Consult OT/PT to assist with strengthening/mobility   - Keep Call bell within reach  - Keep bed low and locked with side rails adjusted as appropriate  - Keep care items and personal belongings within reach  - Initiate and maintain comfort rounds  - Make Fall Risk Sign visible to staff  - Offer Toileting  Outcome: Progressing  Goal: Maintain or return to baseline ADL function  Description: INTERVENTIONS:  -  Assess patient's ability to carry out ADLs; assess patient's baseline for ADL function and identify physical deficits which impact ability to perform ADLs (bathing, care of mouth/teeth, toileting, grooming, dressing, etc.)  - Assess/evaluate cause of self-care deficits   - Assess range of motion  - Assess patient's mobility; develop plan if impaired  - Assess patient's need for assistive devices and provide as appropriate  - Encourage maximum independence but intervene and supervise when necessary  - Involve family in performance of ADLs  - Assess for home care needs following discharge   - Consider OT consult to assist with ADL evaluation and planning for discharge  - Provide patient education as appropriate  Outcome: Progressing     Problem: DISCHARGE PLANNING  Goal: Discharge to home or other facility with appropriate resources  Description: INTERVENTIONS:  - Identify barriers to discharge w/patient and caregiver  - Arrange for needed discharge resources and transportation as appropriate  - Identify discharge learning needs (meds, wound care, etc.)  - Arrange for interpretive services to assist at discharge as needed  - Refer to Case Management Department for coordinating discharge planning if the patient needs post-hospital services based on physician/advanced practitioner order or complex needs related to functional status, cognitive ability, or social support system  Outcome: Progressing

## 2023-10-16 NOTE — PLAN OF CARE
Problem: POSTPARTUM  Goal: Experiences normal postpartum course  Description: INTERVENTIONS:  - Monitor maternal vital signs  - Assess uterine involution and lochia  10/16/2023 1219 by Joanie Field RN  Outcome: Completed  10/16/2023 104 by Joanie Field RN  Outcome: Adequate for Discharge  Goal: Appropriate maternal -  bonding  Description: INTERVENTIONS:  - Identify family support  - Assess for appropriate maternal/infant bonding   -Encourage maternal/infant bonding opportunities  - Referral to  or  as needed  10/16/2023 1219 by Joanie Field RN  Outcome: Completed  10/16/2023 104 by Joanie Field RN  Outcome: Adequate for Discharge  Goal: Establishment of infant feeding pattern  Description: INTERVENTIONS:  - Assess breast/bottle feeding  - Refer to lactation as needed  10/16/2023 1219 by Joanie Field RN  Outcome: Completed  10/16/2023 1041 by Joanie Field RN  Outcome: Adequate for Discharge     Problem: PAIN - ADULT  Goal: Verbalizes/displays adequate comfort level or baseline comfort level  Description: Interventions:  - Encourage patient to monitor pain and request assistance  - Assess pain using appropriate pain scale  - Administer analgesics based on type and severity of pain and evaluate response  - Implement non-pharmacological measures as appropriate and evaluate response  - Consider cultural and social influences on pain and pain management  - Notify physician/advanced practitioner if interventions unsuccessful or patient reports new pain  10/16/2023 1219 by Joanie Field RN  Outcome: Completed  10/16/2023 1041 by Joanie Field RN  Outcome: Adequate for Discharge     Problem: INFECTION - ADULT  Goal: Absence or prevention of progression during hospitalization  Description: INTERVENTIONS:  - Assess and monitor for signs and symptoms of infection  - Monitor lab/diagnostic results  - Monitor all insertion sites, i.e. indwelling lines, tubes, and drains  - Monitor endotracheal if appropriate and nasal secretions for changes in amount and color  - [place] appropriate cooling/warming therapies per order  - Administer medications as ordered  - Instruct and encourage patient and family to use good hand hygiene technique  - Identify and instruct in appropriate isolation precautions for identified infection/condition  10/16/2023 1219 by Thiago Umaña RN  Outcome: Completed  10/16/2023 1041 by Thiago Umaña RN  Outcome: Adequate for Discharge  Goal: Absence of fever/infection during neutropenic period  Description: INTERVENTIONS:  - Monitor WBC    10/16/2023 1219 by Thiago Umaña RN  Outcome: Completed  10/16/2023 1041 by Thiago Umaña RN  Outcome: Adequate for Discharge     Problem: SAFETY ADULT  Goal: Patient will remain free of falls  Description: INTERVENTIONS:  - Educate patient/family on patient safety including physical limitations  - Instruct patient to call for assistance with activity   - Consult OT/PT to assist with strengthening/mobility   - Keep Call bell within reach  - Keep bed low and locked with side rails adjusted as appropriate  - Keep care items and personal belongings within reach  - Initiate and maintain comfort rounds  - Make Fall Risk Sign visible to staff  - Offer Toileting  10/16/2023 1219 by Thiago Umaña RN  Outcome: Completed  10/16/2023 1041 by Thiago Umaña RN  Outcome: Adequate for Discharge  Goal: Maintain or return to baseline ADL function  Description: INTERVENTIONS:  -  Assess patient's ability to carry out ADLs; assess patient's baseline for ADL function and identify physical deficits which impact ability to perform ADLs (bathing, care of mouth/teeth, toileting, grooming, dressing, etc.)  - Assess/evaluate cause of self-care deficits   - Assess range of motion  - Assess patient's mobility; develop plan if impaired  - Assess patient's need for assistive devices and provide as appropriate  - Encourage maximum independence but intervene and supervise when necessary  - Involve family in performance of ADLs  - Assess for home care needs following discharge   - Consider OT consult to assist with ADL evaluation and planning for discharge  - Provide patient education as appropriate  10/16/2023 1219 by Neena Chin RN  Outcome: Completed  10/16/2023 1041 by Neena Chin RN  Outcome: Adequate for Discharge     Problem: DISCHARGE PLANNING  Goal: Discharge to home or other facility with appropriate resources  Description: INTERVENTIONS:  - Identify barriers to discharge w/patient and caregiver  - Arrange for needed discharge resources and transportation as appropriate  - Identify discharge learning needs (meds, wound care, etc.)  - Arrange for interpretive services to assist at discharge as needed  - Refer to Case Management Department for coordinating discharge planning if the patient needs post-hospital services based on physician/advanced practitioner order or complex needs related to functional status, cognitive ability, or social support system  10/16/2023 1219 by Neena Chin RN  Outcome: Completed  10/16/2023 1041 by Neena Chin RN  Outcome: Adequate for Discharge

## 2023-10-16 NOTE — PLAN OF CARE
Problem: POSTPARTUM  Goal: Experiences normal postpartum course  Description: INTERVENTIONS:  - Monitor maternal vital signs  - Assess uterine involution and lochia  Outcome: Adequate for Discharge  Goal: Appropriate maternal -  bonding  Description: INTERVENTIONS:  - Identify family support  - Assess for appropriate maternal/infant bonding   -Encourage maternal/infant bonding opportunities  - Referral to  or  as needed  Outcome: Adequate for Discharge  Goal: Establishment of infant feeding pattern  Description: INTERVENTIONS:  - Assess breast/bottle feeding  - Refer to lactation as needed  Outcome: Adequate for Discharge     Problem: PAIN - ADULT  Goal: Verbalizes/displays adequate comfort level or baseline comfort level  Description: Interventions:  - Encourage patient to monitor pain and request assistance  - Assess pain using appropriate pain scale  - Administer analgesics based on type and severity of pain and evaluate response  - Implement non-pharmacological measures as appropriate and evaluate response  - Consider cultural and social influences on pain and pain management  - Notify physician/advanced practitioner if interventions unsuccessful or patient reports new pain  Outcome: Adequate for Discharge     Problem: INFECTION - ADULT  Goal: Absence or prevention of progression during hospitalization  Description: INTERVENTIONS:  - Assess and monitor for signs and symptoms of infection  - Monitor lab/diagnostic results  - Monitor all insertion sites, i.e. indwelling lines, tubes, and drains  - Monitor endotracheal if appropriate and nasal secretions for changes in amount and color  - [place] appropriate cooling/warming therapies per order  - Administer medications as ordered  - Instruct and encourage patient and family to use good hand hygiene technique  - Identify and instruct in appropriate isolation precautions for identified infection/condition  Outcome: Adequate for Discharge  Goal: Absence of fever/infection during neutropenic period  Description: INTERVENTIONS:  - Monitor WBC    Outcome: Adequate for Discharge     Problem: SAFETY ADULT  Goal: Patient will remain free of falls  Description: INTERVENTIONS:  - Educate patient/family on patient safety including physical limitations  - Instruct patient to call for assistance with activity   - Consult OT/PT to assist with strengthening/mobility   - Keep Call bell within reach  - Keep bed low and locked with side rails adjusted as appropriate  - Keep care items and personal belongings within reach  - Initiate and maintain comfort rounds  - Make Fall Risk Sign visible to staff  - Offer Toileting  Outcome: Adequate for Discharge  Goal: Maintain or return to baseline ADL function  Description: INTERVENTIONS:  -  Assess patient's ability to carry out ADLs; assess patient's baseline for ADL function and identify physical deficits which impact ability to perform ADLs (bathing, care of mouth/teeth, toileting, grooming, dressing, etc.)  - Assess/evaluate cause of self-care deficits   - Assess range of motion  - Assess patient's mobility; develop plan if impaired  - Assess patient's need for assistive devices and provide as appropriate  - Encourage maximum independence but intervene and supervise when necessary  - Involve family in performance of ADLs  - Assess for home care needs following discharge   - Consider OT consult to assist with ADL evaluation and planning for discharge  - Provide patient education as appropriate  Outcome: Adequate for Discharge     Problem: DISCHARGE PLANNING  Goal: Discharge to home or other facility with appropriate resources  Description: INTERVENTIONS:  - Identify barriers to discharge w/patient and caregiver  - Arrange for needed discharge resources and transportation as appropriate  - Identify discharge learning needs (meds, wound care, etc.)  - Arrange for interpretive services to assist at discharge as needed  - Refer to Case Management Department for coordinating discharge planning if the patient needs post-hospital services based on physician/advanced practitioner order or complex needs related to functional status, cognitive ability, or social support system  Outcome: Adequate for Discharge

## 2023-10-16 NOTE — PROGRESS NOTES
Progress Note - OB/GYN  Darren Grimaldo 25 y.o. female MRN: [id number]  Unit/Bed#: -01 Encounter: [id number]    Assessment and Plan     Darren Grimaldo is a patient of: [address] [place] [address]. 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  Assessment & Plan  10/14: Rapid response: QBL 256cc, TXA, methergine, bimanual exam with clot, 2g cefoxitin    History of inadequate prenatal care  Assessment & Plan  -Absence of presentation to appointments between 23 to 10/12/23  -In setting of homelessness  -Requires postpartum case management consult    * Anxiety and depression  Assessment & Plan  Discuss antidepressants postpartum        Disposition    - Anticipate discharge home on PPD# 2 - today, pending case management follow-up      Subjective/Objective     Chief Complaint: Postpartum State     Subjective: Darren Grimaldo is PPD#2 s/p  spontaneous vaginal delivery. She has no current complaints. Pain is well controlled. Patient is currently voiding. She is ambulating. Patient is currently passing flatus and has had no bowel movement. She is tolerating PO, and denies nausea or vomitting. Patient denies fever, chills, chest pain, shortness of breath, or calf tenderness. Lochia is normal. She is formula feeding. She is recovering well and is stable.        Vitals:   /59 (BP Location: Left arm)   Pulse 65   Temp 98.4 °F (36.9 °C) (Oral)   Resp 16   Ht 5' 5.98" (1.676 m)   Wt 71.2 kg (157 lb)   LMP 2023 (Exact Date)   SpO2 100%   Breastfeeding No   BMI 25.35 kg/m²     No intake or output data in the 24 hours ending 10/16/23 0554    Invasive Devices Peripheral Intravenous Line  Duration             Peripheral IV 10/14/23 Left;Ventral (anterior) Forearm 1 day                    Physical Exam:   GEN: Saadia Stagers appears well, alert and oriented x 3, pleasant and cooperative   CARDIO: RRR, no murmurs or rubs  RESP:  CTAB, no wheezes or rales  ABDOMEN: soft, no tenderness, no distention, fundus firm and below umbilicus  EXTREMITIES: SCDs on, non tender, no erythema      Labs:     Hemoglobin   Date Value Ref Range Status   10/14/2023 11.1 (L) 11.5 - 15.4 g/dL Final   10/12/2023 11.9 11.5 - 15.4 g/dL Final     Hgb, i-STAT   Date Value Ref Range Status   10/14/2023 10.5 (L) 11.5 - 15.4 g/dl Final     WBC   Date Value Ref Range Status   10/14/2023 9.83 4.31 - 10.16 Thousand/uL Final   10/12/2023 7.42 4.31 - 10.16 Thousand/uL Final     Platelets   Date Value Ref Range Status   10/14/2023 186 149 - 390 Thousands/uL Final   10/12/2023 197 149 - 390 Thousands/uL Final     Creatinine   Date Value Ref Range Status   04/15/2023 0.56 (L) 0.60 - 1.30 mg/dL Final     Comment:     Standardized to IDMS reference method   06/25/2021 0.75 0.60 - 1.30 mg/dL Final     Comment:     Standardized to IDMS reference method     AST   Date Value Ref Range Status   04/15/2023 33 13 - 39 U/L Final     Comment:     Moderately Hemolyzed:Results may be affected. 01/27/2020 22 5 - 45 U/L Final     Comment:       Specimen collection should occur prior to Sulfasalazine administration due to the potential for falsely depressed results. ALT   Date Value Ref Range Status   04/15/2023 14 7 - 52 U/L Final     Comment:     Specimen collection should occur prior to Sulfasalazine administration due to the potential for falsely depressed results. 01/27/2020 26 12 - 78 U/L Final     Comment:       Specimen collection should occur prior to Sulfasalazine administration due to the potential for falsely depressed results.            Kacey Middleton MD  10/16/2023  5:54 AM

## 2023-10-16 NOTE — CASE MANAGEMENT
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 10/16/2023    History of inadequate prenatal care 10/13/2023     (spontaneous vaginal delivery) 10/12/2023    Poor fetal growth affecting management of mother in third trimester 08/10/2023    Postpartum depression 2021    Encounter for induction of labor 2020    Carrier of beta thalassemia 2020    Anxiety and depression 2020    Previous  delivery, antepartum 2020      LOS (days): 3  Geometric Mean LOS (GMLOS) (days):   Days to GMLOS:     OBJECTIVE:  Risk of Unplanned Readmission Score: 4.84         Current admission status: Inpatient   Preferred Pharmacy:   [address] [place] [address] [address] [place] [address], [address] [place] [address] [address]/ [address] [facility] [place] [address]  Phone: [phone] Fax: [phone]    [address] [place], [place] - [address]  [address]  [address] [place] [address]  Phone: [phone] Fax: [phone]    Primary Care Provider: Siddharth Agrawal DO    Primary Insurance: Gabe Underwood  Secondary Insurance:     DISCHARGE DETAILS:  CM spoke with Meagan Morning from [facility] and [place], [facility] [phone]. CM introduced self and role. Meagan Morning stated MOB is cleared to discharge today with [facility]. [facility]/u with [facility] outpatient. MOB cleared to discharge home with Baby Girl. No other CM needs noted.

## 2023-10-17 NOTE — UTILIZATION REVIEW
NOTIFICATION OF INPATIENT ADMISSION   MATERNITY/DELIVERY AUTHORIZATION REQUEST   SERVICING FACILITY:   [address] [place] [address] - L&D, [place], NICU  [facility]. [facility], [address] [place] [address]  Tax ID: [id number]  NPI: [id number]   ATTENDING PROVIDER:  Attending Name and NPI#: Sloane Mcdonald [[id number]]  Address: [address] [place] [address]. [facility], [address] [place] [address]  Phone: [phone]   ADMISSION INFORMATION:  Place of Service: Inpatient [facility]  Place of Service Code: 21  Inpatient Admission Date/Time: 10/13/23  6:06 PM  Discharge Date/Time: 10/16/2023 12:00 PM  Admitting Diagnosis Code/Description:  37 weeks gestation of pregnancy [Z3A.37]  Encounter for induction of labor [Z34.90]  Encounter for full-term uncomplicated delivery [H40]     Mother: Samy Medellin 1995 Estimated Date of Delivery: 10/28/23  Delivering clinician: Eugenia Escobar    OB History          8    Para   5    Term   2       3    AB   3    Living   4         SAB   3    IAB   0    Ectopic   0    Multiple   0    Live Births   5           Obstetric Comments   Age at menarche: 10  Age at first child: 23  Cramps: Yes  Coitarche: 13                     [facility] [place] Name & MRN:   Information for the patient's :  Citlali Henriquez [[id number]]    Delivery Information:  Sex: female  Delivered 10/14/2023 1:44 AM by Vaginal, Spontaneous; Gestational Age: 42w0d    [facility] [place] Measurements:  Weight: 5 lb 11.7 oz (2600 g); Height: 18.5"    APGAR 1 minute 5 minutes 10 minutes   Totals: 9 9       Birth Information: 25 y.o. female MRN: [id number] Unit/Bed#: -01   Birthweight: No birth weight on file. Gestational Age: <None> Delivery Type:    APGARS Totals:        UTILIZATION REVIEW CONTACT:  [employer] Utilization   Network Utilization Review Department  Phone: [phone]  Fax [phone]  Email: [email or web address]  Contact for approvals/pending authorizations, clinical reviews, and discharge. PHYSICIAN ADVISORY SERVICES:  Medical Necessity Denial & Zjiu-yf-Dzqx Review  Phone: [phone]  Fax: [phone]  Email: [email or web address]     DISCHARGE SUPPORT TEAM:  For Patients Discharge Needs & Updates  Phone: [phone] opt. 2 Fax: [phone]  Email: [email or web address]

## 2023-10-18 PROCEDURE — 88307 TISSUE EXAM BY PATHOLOGIST: CPT | Performed by: STUDENT IN AN ORGANIZED HEALTH CARE EDUCATION/TRAINING PROGRAM

## 2023-10-30 LAB
DME PARACHUTE DELIVERY DATE ACTUAL: NORMAL
DME PARACHUTE DELIVERY DATE REQUESTED: NORMAL
DME PARACHUTE ITEM DESCRIPTION: NORMAL
DME PARACHUTE ORDER STATUS: NORMAL
DME PARACHUTE SUPPLIER NAME: NORMAL
DME PARACHUTE SUPPLIER PHONE: NORMAL

## 2024-01-23 ENCOUNTER — OFFICE VISIT (OUTPATIENT)
Dept: OBGYN CLINIC | Facility: CLINIC | Age: 29
End: 2024-01-23
Payer: COMMERCIAL

## 2024-01-23 VITALS
HEIGHT: 66 IN | WEIGHT: 144 LBS | SYSTOLIC BLOOD PRESSURE: 124 MMHG | BODY MASS INDEX: 23.14 KG/M2 | DIASTOLIC BLOOD PRESSURE: 82 MMHG

## 2024-01-23 DIAGNOSIS — Z30.09 BIRTH CONTROL COUNSELING: ICD-10-CM

## 2024-01-23 DIAGNOSIS — Z78.9 NEED FOR FOLLOW-UP BY SOCIAL WORKER: ICD-10-CM

## 2024-01-23 DIAGNOSIS — Z30.017 NEXPLANON INSERTION: Primary | ICD-10-CM

## 2024-01-23 PROBLEM — Z34.90 ENCOUNTER FOR INDUCTION OF LABOR: Status: RESOLVED | Noted: 2020-12-28 | Resolved: 2024-01-23

## 2024-01-23 PROBLEM — O36.5930 POOR FETAL GROWTH AFFECTING MANAGEMENT OF MOTHER IN THIRD TRIMESTER: Status: RESOLVED | Noted: 2023-08-10 | Resolved: 2024-01-23

## 2024-01-23 PROBLEM — O09.219 PREVIOUS PRETERM DELIVERY, ANTEPARTUM: Status: RESOLVED | Noted: 2020-02-14 | Resolved: 2024-01-23

## 2024-01-23 LAB — SL AMB POCT URINE HCG: NEGATIVE

## 2024-01-23 PROCEDURE — 11981 INSERTION DRUG DLVR IMPLANT: CPT | Performed by: STUDENT IN AN ORGANIZED HEALTH CARE EDUCATION/TRAINING PROGRAM

## 2024-01-23 PROCEDURE — 81025 URINE PREGNANCY TEST: CPT | Performed by: STUDENT IN AN ORGANIZED HEALTH CARE EDUCATION/TRAINING PROGRAM

## 2024-01-23 PROCEDURE — 99213 OFFICE O/P EST LOW 20 MIN: CPT | Performed by: STUDENT IN AN ORGANIZED HEALTH CARE EDUCATION/TRAINING PROGRAM

## 2024-01-23 NOTE — PATIENT INSTRUCTIONS
Depression   AMBULATORY CARE:   Depression  is a mood disorder that causes feelings of sadness or hopelessness that do not go away. Depression may cause you to lose interest in things you used to enjoy. These feelings may interfere with your daily life.  Common signs and symptoms:   Appetite changes, or weight gain or loss    Trouble falling or staying asleep, or sleeping too much    Fatigue (being mentally and physically tired) or lack of energy    Feeling restless, irritable, or withdrawn    Feeling worthless, hopeless, discouraged, or guilty    Trouble concentrating, remembering things, doing daily tasks, or making decisions    Thoughts about hurting or killing yourself    Call your local emergency number (911 in the [place]) if:   You think about hurting yourself or someone else.    You have done something on purpose to hurt yourself.    Call your therapist or doctor if:   Your symptoms get worse or do not get better with treatment.    Your depression keeps you from doing your regular daily activities.    You have new symptoms since your last visit.    You have questions or concerns about your condition or care.    The following resources are available at any time to help you, if needed:   Contact a suicide prevention organization:        For the [employer] Suicide and Crisis Lifeline:     Call or text [employer]     Send a chat on https://[employer].org/chat     Call 3-288-441-6587 (1-800-273-TALK)    For the Suicide Hotline, call 7-070-114-8755 (6-339-CQVCNBQ)    For a list of international numbers: https://save.org/find-help/international-resources/  Treatment for depression  depends on how severe your symptoms are. You may need any of the following:  Cognitive behavioral therapy (CBT)  teaches you how to identify and change negative thought patterns.    Antidepressant medicine  may be given to decrease or manage symptoms. You may need to take this medicine for several weeks before they start working.    Self-care:   Talk to  someone about your depression.  Your healthcare provider may suggest counseling. You might feel more comfortable talking with a friend or family member about your depression. Choose someone you know will be supportive and encouraging.    Get regular physical activity.  Physical activity can lower your stress, improve your mood, and help you sleep better. Work with your healthcare provider to develop a plan that you enjoy.         Create a regular sleep schedule.  A routine can help you relax before bed. Listen to music, read, or do yoga. Try to go to bed and wake up at the same time every day. Sleep is important for emotional health.    Eat a variety of healthy foods.  Healthy foods include fruits, vegetables, whole-grain breads, low-fat dairy products, lean meats, fish, and cooked beans. A healthy meal plan is low in fat, salt, and added sugar.         Do not use alcohol, drugs, or nicotine products.  These can worsen depression or make it hard to manage. Talk to your therapist or healthcare provider if you use any of these products and need help to quit.    Follow up with your therapist or doctor as directed:  Your healthcare provider will monitor your progress at follow-up visits. Your provider will also monitor your medicine if you take antidepressants and ask if the medicine is helping. Tell your provider about any side effects or problems you have with your medicine. The type or amount of medicine may need to be changed. Write down your questions so you remember to ask them during your visits.  For more information or support:   [employer]  [address]  [place] [address]  Phone: 8- 567 - 662-1706  Phone: 8- 669 - 461-2425  Web Address: http://www.[employer]mas.org  [address] Suicide and Crisis [facility]  [address]  [place] [address]  Phone: 3- 885 - 975  Web Address: http://www.suicidepreventionlifeline.org OR https://[employer].org/chat/    © Copyright [employer] 2023  Information is for End User's use only and may not be sold, redistributed or otherwise used for commercial purposes.  The above information is an  only. It is not intended as medical advice for individual conditions or treatments. Talk to your doctor, nurse or pharmacist before following any medical regimen to see if it is safe and effective for you.

## 2024-01-23 NOTE — PROGRESS NOTES
[facility]  Contraception discussion  Emily Mayorga MD  24      Assessment/Plan:  27 yo  sexually active female presenting for contraception discussion- we reviewed all available contraceptive methods including OCPs, POPs, Depo provera, LARCs including Nexplanon and IUDs and permanent methods including sterilization- Kristine is most interested in the Nexplanon which she did well on in the past.    We reviewed most common risks and SE including menstrual changes and hormonal changes that can occur and change mood, HA, bloating- we reviewed risks of placement including bleeding, infection, hematoma, deep insertion requiring surgical removal, injury to surrounding nerves and blood vessels. Patient reports understanding and given extra Nexplanon in office today- was placed for her today- please see separate procedure note.    In regards to patient social needs we reviewed and resources were given in the community where she could find local social work assistance-0 in addition a psych referral was placed and information was given on local therapy and [facility] resources including a virtual option. I also explained our support and that she could ALWAYS reach out to our office for any needs that we can try our best to help her with.     Patient to return to office in 2-3 months for annual exam.      Diagnoses and all orders for this visit:    Nexplanon insertion  -     POCT urine HCG- negative in office today  -     etonogestrel (NEXPLANON) subdermal implant 68 mg      Subjective:      Patient ID: Kristine Case is a 28 y.o. female.    HPI  Kristine is a 27 yo  sexually active female on no current contraception presenting to discuss contraception- she previously had the Nexplanon and would like it placed again if possible. Denies any Gyn complaints- she is bottle feeding her recent 3 month old.     Patient is visibly upset today ather appointment- known to me from her prenatal care  "with numerous social needs including homelessness in pregnancy- now doing so much better as she has obtained section 8 housing but continues to have bouts of depressive thoughts and is still tryin to work out everything that had happened to her and her family during her pregnancy. I gave her support and reassurance today- I offered her referral to psych for counseling services and we also reviewed other resources in the [facility] system including our [facility] offices with excellent resources     Last pap: 5/2023- NILM    The following portions of the patient's history were reviewed and updated as appropriate: allergies, current medications, past family history, past medical history, past social history, past surgical history, and problem list.    Review of Systems    Per HPI    Objective:  /82 (BP Location: Left arm, Patient Position: Sitting, Cuff Size: Standard)   Ht 5' 5.98\" (1.676 m)   Wt 65.3 kg (144 lb)   LMP 12/24/2023 (Approximate)   Breastfeeding No   BMI 23.26 kg/m²      Physical Exam  Vitals reviewed.   Constitutional:       General: She is not in acute distress.     Appearance: She is well-developed. She is not ill-appearing or diaphoretic.   HENT:      Head: Normocephalic and atraumatic.   Cardiovascular:      Rate and Rhythm: Normal rate and regular rhythm.   Pulmonary:      Effort: Pulmonary effort is normal. No respiratory distress.   Abdominal:      Palpations: Abdomen is soft.      Tenderness: There is no abdominal tenderness. There is no guarding or rebound.   Genitourinary:     Vagina: Normal.   Musculoskeletal:      Cervical back: Normal range of motion and neck supple.      Right lower leg: No edema.      Left lower leg: No edema.   Skin:     General: Skin is warm and dry.   Neurological:      Mental Status: She is alert and oriented to person, place, and time.   Psychiatric:      Comments: Tearful today at her visit           "

## 2024-01-23 NOTE — PROGRESS NOTES
[facility]  Nexplanon insertion  Emily Mayorga MD  01/23/24      Universal Protocol:  Procedure performed by: (Iqra Brunson NP)  Consent: Verbal consent obtained.  Risks and benefits: risks, benefits and alternatives were discussed  Consent given by: patient  Required items: required blood products, implants, devices, and special equipment available  Remove and insert drug implant    Date/Time: 1/23/2024 9:30 AM    Performed by: Emily Mayorga MD  Authorized by: Emily Mayorga MD    Indication:     Indication: Insertion of non-biodegradable drug delivery implant    Pre-procedure:     Prepped with: alcohol 70%      Prepped with comment:  Allergy to shellfish    Local anesthetic:  Lidocaine 1%    The site was cleaned and prepped in a sterile fashion: yes    Procedure:     Procedure:  Insertion    Small stab incision was made in arm: no      Left/right:  Right    Preloaded contraceptive capsule trocar was placed subdermally: yes      Visualization of implant was obtained: yes      Contraceptive capsule was inserted and trocar removed: yes      Visualization of notch in stylet and palpation of device: yes      Palpation confirms placement by provider and patient: yes      Site was closed with steri-strips and pressure bandage applied: yes      Post procedure precautions and restrictions reviewed  RTO in 2-3 months for annual exam or sooner if needed.

## 2024-05-30 ENCOUNTER — OFFICE VISIT (OUTPATIENT)
Dept: FAMILY MEDICINE CLINIC | Facility: CLINIC | Age: 29
End: 2024-05-30
Payer: COMMERCIAL

## 2024-05-30 VITALS
OXYGEN SATURATION: 99 % | WEIGHT: 143.2 LBS | TEMPERATURE: 97.3 F | RESPIRATION RATE: 16 BRPM | HEIGHT: 66 IN | DIASTOLIC BLOOD PRESSURE: 73 MMHG | SYSTOLIC BLOOD PRESSURE: 128 MMHG | HEART RATE: 79 BPM | BODY MASS INDEX: 23.01 KG/M2

## 2024-05-30 DIAGNOSIS — G43.001 MIGRAINE WITHOUT AURA AND WITH STATUS MIGRAINOSUS, NOT INTRACTABLE: Primary | ICD-10-CM

## 2024-05-30 DIAGNOSIS — F32.A ANXIETY AND DEPRESSION: ICD-10-CM

## 2024-05-30 DIAGNOSIS — F41.9 ANXIETY AND DEPRESSION: ICD-10-CM

## 2024-05-30 DIAGNOSIS — Z12.4 CERVICAL CANCER SCREENING: ICD-10-CM

## 2024-05-30 DIAGNOSIS — R10.13 EPIGASTRIC PAIN: ICD-10-CM

## 2024-05-30 DIAGNOSIS — K42.9 UMBILICAL HERNIA WITHOUT OBSTRUCTION AND WITHOUT GANGRENE: ICD-10-CM

## 2024-05-30 DIAGNOSIS — Z13.1 SCREENING FOR DIABETES MELLITUS: ICD-10-CM

## 2024-05-30 DIAGNOSIS — Z13.6 SCREENING FOR CARDIOVASCULAR CONDITION: ICD-10-CM

## 2024-05-30 PROCEDURE — 99204 OFFICE O/P NEW MOD 45 MIN: CPT | Performed by: FAMILY MEDICINE

## 2024-05-30 RX ORDER — RIZATRIPTAN BENZOATE 5 MG/1
5 TABLET, ORALLY DISINTEGRATING ORAL ONCE AS NEEDED
Qty: 10 TABLET | Refills: 2 | Status: SHIPPED | OUTPATIENT
Start: 2024-05-30

## 2024-05-30 RX ORDER — FAMOTIDINE 20 MG/1
20 TABLET, FILM COATED ORAL 2 TIMES DAILY
Qty: 60 TABLET | Refills: 1 | Status: SHIPPED | OUTPATIENT
Start: 2024-05-30

## 2024-05-30 NOTE — PROGRESS NOTES
Ambulatory Visit  Name: Kristine Case      : 1995      MRN: [id number]  Encounter Provider: Sandrita Chappell MD  Encounter Date: 2024   Encounter department: [facility]    Assessment & Plan   1. Migraine without aura and with status migrainosus, not intractable  -     MRI brain w wo contrast; Future; Expected date: 2024  -     Iron Panel (Includes Ferritin, Iron Sat%, Iron, and TIBC); Future  -     TSH, 3rd generation with Free T4 reflex; Future  -     UA w Reflex to Microscopic w Reflex to Culture; Future; Expected date: 2024  -     Ambulatory referral to Neurology; Future  -     rizatriptan (MAXALT-MLT) 5 mg disintegrating tablet; Take 1 tablet (5 mg total) by mouth once as needed for migraine for up to 1 dose may repeat in 2 hours if necessary  -     Magnesium; Future  -     Folate; Future  -     Prolactin; Future  -     hCG, quantitative; Future  2. Umbilical hernia without obstruction and without gangrene  -     Ambulatory Referral to General Surgery; Future  3. Anxiety and depression  -     TSH, 3rd generation with Free T4 reflex; Future  4. Epigastric pain  -     US abdomen complete; Future; Expected date: 2024  -     famotidine (PEPCID) 20 mg tablet; Take 1 tablet (20 mg total) by mouth 2 (two) times a day  -     Ambulatory Referral to Gastroenterology; Future  -     Iron Panel (Includes Ferritin, Iron Sat%, Iron, and TIBC); Future  -     CBC and differential; Future  -     Comprehensive metabolic panel; Future  -     Vitamin B12; Future  -     Folate; Future  -     hCG, quantitative; Future  5. Cervical cancer screening  -     Ambulatory Referral to Obstetrics / Gynecology; Future  6. Screening for cardiovascular condition  -     Lipid panel; Future  7. Screening for diabetes mellitus  -     Hemoglobin A1C; Future  -     UA w Reflex to Microscopic w Reflex to Culture; Future; Expected date: 2024      Regarding her migraines, patient  education.  Patient advised to hydrate well and to eat 3 healthy meals on time.  Also take a multivitamin.  Patient will be sent for the above blood work and urine.  She is ordered an MRI of the brain as above as well as a referral to a neurologist.  Patient advised that if her headache changes worsen and or she does not feel comfortable with them the patient to go to the emergency room or call 911.  Regarding her umbilical hernia, discussed with patient.  Patient education.  No lifting straining constipation etc.  Patient will get her blood work as well.  And patient referred to general surgery for an evaluation.  Regarding her anxiety depression, patient has been seen in therapy.  Patient encouraged to follow-up with them regularly.  Patient also encouraged starting good/better care of herself physically and mentally.  Will check her labs.  Patient denies SI HI.  Regarding her epigastric pain, discussed with patient.  Patient advised to cut causative agents including cutting down on her anxiety as well as cutting and then ultimately quitting smoking completely.  Patient will get the above blood work and urine.  Patient also get an abdominal sonogram.  And patient is sent to GI for possible upper endoscopy as well.  Patient sent to the GYN for her Pap smear and her GYN annual.  And her blood work she also be screened for diabetes and her lipid panel was ordered as well.  RTO 2 months for annual physical and do the blood work and urine before.  Patient can see me prior to that for anything else in between.            Depression Screening and Follow-up Plan: Patient was screened for depression during today's encounter. They screened negative with a PHQ-9 score of 0.    Tobacco Cessation Counseling: Tobacco cessation counseling was provided. The patient is sincerely urged to quit consumption of tobacco. She is not ready to quit tobacco. Discussed with patient.  And will discuss with patient at her next visit as  "well.      History of Present Illness     28-year-old female, new patient, here to establish care and be evaluated for her migraines, umbilical hernia, for anxiety and depression as well as epigastric pain.  Patient denies pregnancy.  Patient is a smoker but precontemplative.  Patient gave birth less than a year ago and is not breast-feeding.  Patient has not seen her GYN yet.  Patient does have history anxiety depression and is now seeing another counselor.  Patient denies SI HI.        Review of Systems   Constitutional:  Negative for chills, fatigue, fever and unexpected weight change.   HENT:  Negative for congestion and sore throat.    Eyes:  Negative for visual disturbance.   Respiratory:  Negative for cough and shortness of breath.    Cardiovascular:  Negative for chest pain and palpitations.   Gastrointestinal:  Positive for abdominal pain. Negative for blood in stool, constipation, diarrhea, nausea and vomiting.   Genitourinary:  Negative for dysuria and hematuria.   Musculoskeletal:  Negative for arthralgias.   Skin:  Negative for rash.   Neurological:  Positive for headaches. Negative for dizziness.   Psychiatric/Behavioral:  Positive for dysphoric mood. Negative for self-injury and suicidal ideas. The patient is nervous/anxious.        Objective     /73 (BP Location: Left arm, Patient Position: Sitting, Cuff Size: Adult)   Pulse 79   Temp (!) 97.3 °F (36.3 °C) (Temporal)   Resp 16   Ht 5' 5.9\" (1.674 m)   Wt 65 kg (143 lb 3.2 oz)   SpO2 99%   BMI 23.18 kg/m²     Physical Exam  Vitals reviewed.   Constitutional:       General: She is not in acute distress.     Appearance: Normal appearance. She is normal weight. She is not ill-appearing.   HENT:      Head: Normocephalic and atraumatic.      Right Ear: Tympanic membrane, ear canal and external ear normal.      Left Ear: Tympanic membrane, ear canal and external ear normal.      Mouth/Throat:      Mouth: Mucous membranes are moist.      " Pharynx: Oropharynx is clear.   Eyes:      Extraocular Movements: Extraocular movements intact.      Conjunctiva/sclera: Conjunctivae normal.   Neck:      Vascular: No carotid bruit.   Cardiovascular:      Rate and Rhythm: Normal rate and regular rhythm.   Pulmonary:      Effort: Pulmonary effort is normal.      Breath sounds: Normal breath sounds.   Abdominal:      General: Bowel sounds are normal.      Palpations: Abdomen is soft.      Tenderness: There is no abdominal tenderness. There is no right CVA tenderness or left CVA tenderness.   Musculoskeletal:         General: No tenderness.      Cervical back: Neck supple.      Right lower leg: No edema.      Left lower leg: No edema.      Comments: No calf tenderness bilateral.   Lymphadenopathy:      Cervical: No cervical adenopathy.   Skin:     General: Skin is warm.      Findings: No rash.   Neurological:      General: No focal deficit present.      Mental Status: She is alert and oriented to person, place, and time.   Psychiatric:         Mood and Affect: Mood normal.         Behavior: Behavior normal.         Thought Content: Thought content normal.      Comments: Her anxiety depression and her stress discussed with patient.  Patient counseled.  Patient denies SI HI.  Patient readvised to follow-up with a therapist.       Administrative Statements